# Patient Record
Sex: MALE | Race: WHITE | NOT HISPANIC OR LATINO | Employment: FULL TIME | URBAN - METROPOLITAN AREA
[De-identification: names, ages, dates, MRNs, and addresses within clinical notes are randomized per-mention and may not be internally consistent; named-entity substitution may affect disease eponyms.]

---

## 2017-01-04 ENCOUNTER — TELEPHONE (OUTPATIENT)
Dept: INTERNAL MEDICINE | Facility: CLINIC | Age: 62
End: 2017-01-04

## 2017-01-04 DIAGNOSIS — F90.2 ATTENTION DEFICIT HYPERACTIVITY DISORDER (ADHD), COMBINED TYPE: ICD-10-CM

## 2017-01-04 DIAGNOSIS — R97.20 ELEVATED PSA: ICD-10-CM

## 2017-01-04 DIAGNOSIS — E29.1 HYPOGONADISM MALE: ICD-10-CM

## 2017-01-04 DIAGNOSIS — I10 ESSENTIAL HYPERTENSION: ICD-10-CM

## 2017-01-04 DIAGNOSIS — R73.03 PREDIABETES: ICD-10-CM

## 2017-01-04 DIAGNOSIS — E78.5 HYPERLIPIDEMIA, UNSPECIFIED HYPERLIPIDEMIA TYPE: ICD-10-CM

## 2017-01-04 DIAGNOSIS — Q28.3 CAVERNOUS MALFORMATION: ICD-10-CM

## 2017-01-04 DIAGNOSIS — Z00.00 WELL ADULT EXAM: Primary | ICD-10-CM

## 2017-01-04 DIAGNOSIS — I77.810 ASCENDING AORTA DILATATION: Chronic | ICD-10-CM

## 2017-01-04 DIAGNOSIS — N40.1 BENIGN NON-NODULAR PROSTATIC HYPERPLASIA WITH LOWER URINARY TRACT SYMPTOMS: ICD-10-CM

## 2017-01-04 NOTE — TELEPHONE ENCOUNTER
Please order labs to be scheduled for yearly visit.      Patient has appt. 1/20/2017 with Dr. Ahn.    Will come a week before appt. For labs.

## 2017-01-09 ENCOUNTER — TELEPHONE (OUTPATIENT)
Dept: INTERNAL MEDICINE | Facility: CLINIC | Age: 62
End: 2017-01-09

## 2017-01-10 ENCOUNTER — TELEPHONE (OUTPATIENT)
Dept: INTERNAL MEDICINE | Facility: CLINIC | Age: 62
End: 2017-01-10

## 2017-01-10 ENCOUNTER — LAB VISIT (OUTPATIENT)
Dept: LAB | Facility: HOSPITAL | Age: 62
End: 2017-01-10
Attending: FAMILY MEDICINE
Payer: COMMERCIAL

## 2017-01-10 DIAGNOSIS — E29.1 HYPOGONADISM MALE: ICD-10-CM

## 2017-01-10 DIAGNOSIS — Z11.59 NEED FOR HEPATITIS C SCREENING TEST: ICD-10-CM

## 2017-01-10 DIAGNOSIS — E78.5 HYPERLIPIDEMIA, UNSPECIFIED HYPERLIPIDEMIA TYPE: ICD-10-CM

## 2017-01-10 DIAGNOSIS — R73.03 PREDIABETES: ICD-10-CM

## 2017-01-10 DIAGNOSIS — Z00.00 WELL ADULT EXAM: ICD-10-CM

## 2017-01-10 DIAGNOSIS — E29.1 HYPOGONADISM MALE: Primary | ICD-10-CM

## 2017-01-10 DIAGNOSIS — R97.20 ELEVATED PSA: ICD-10-CM

## 2017-01-10 LAB
25(OH)D3+25(OH)D2 SERPL-MCNC: 35 NG/ML
ALBUMIN SERPL BCP-MCNC: 4.2 G/DL
ALP SERPL-CCNC: 57 U/L
ALT SERPL W/O P-5'-P-CCNC: 24 U/L
ANION GAP SERPL CALC-SCNC: 9 MMOL/L
AST SERPL-CCNC: 28 U/L
BASOPHILS # BLD AUTO: 0.04 K/UL
BASOPHILS NFR BLD: 0.5 %
BILIRUB SERPL-MCNC: 0.7 MG/DL
BUN SERPL-MCNC: 22 MG/DL
CALCIUM SERPL-MCNC: 9.1 MG/DL
CHLORIDE SERPL-SCNC: 105 MMOL/L
CHOLEST/HDLC SERPL: 4.6 {RATIO}
CO2 SERPL-SCNC: 28 MMOL/L
COMPLEXED PSA SERPL-MCNC: 4.8 NG/ML
CREAT SERPL-MCNC: 0.9 MG/DL
DIFFERENTIAL METHOD: ABNORMAL
EOSINOPHIL # BLD AUTO: 0.2 K/UL
EOSINOPHIL NFR BLD: 2.5 %
ERYTHROCYTE [DISTWIDTH] IN BLOOD BY AUTOMATED COUNT: 14.9 %
EST. GFR  (AFRICAN AMERICAN): >60 ML/MIN/1.73 M^2
EST. GFR  (NON AFRICAN AMERICAN): >60 ML/MIN/1.73 M^2
ESTIMATED AVG GLUCOSE: 134 MG/DL
GLUCOSE SERPL-MCNC: 118 MG/DL
HBA1C MFR BLD HPLC: 6.3 %
HCT VFR BLD AUTO: 45.4 %
HCV AB SERPL QL IA: NEGATIVE
HDL/CHOLESTEROL RATIO: 21.7 %
HDLC SERPL-MCNC: 217 MG/DL
HDLC SERPL-MCNC: 47 MG/DL
HGB BLD-MCNC: 15.5 G/DL
LDLC SERPL CALC-MCNC: 145.8 MG/DL
LYMPHOCYTES # BLD AUTO: 2.4 K/UL
LYMPHOCYTES NFR BLD: 30.1 %
MCH RBC QN AUTO: 31.5 PG
MCHC RBC AUTO-ENTMCNC: 34.1 %
MCV RBC AUTO: 92 FL
MONOCYTES # BLD AUTO: 0.7 K/UL
MONOCYTES NFR BLD: 8.2 %
NEUTROPHILS # BLD AUTO: 4.7 K/UL
NEUTROPHILS NFR BLD: 58.4 %
NONHDLC SERPL-MCNC: 170 MG/DL
PLATELET # BLD AUTO: 209 K/UL
PMV BLD AUTO: 11.8 FL
POTASSIUM SERPL-SCNC: 4.2 MMOL/L
PROT SERPL-MCNC: 7.3 G/DL
RBC # BLD AUTO: 4.92 M/UL
SODIUM SERPL-SCNC: 142 MMOL/L
T4 FREE SERPL-MCNC: 0.97 NG/DL
TESTOST SERPL-MCNC: 587 NG/DL
TRIGL SERPL-MCNC: 121 MG/DL
TSH SERPL DL<=0.005 MIU/L-ACNC: 0.9 UIU/ML
WBC # BLD AUTO: 7.97 K/UL

## 2017-01-10 PROCEDURE — 84403 ASSAY OF TOTAL TESTOSTERONE: CPT

## 2017-01-10 PROCEDURE — 83036 HEMOGLOBIN GLYCOSYLATED A1C: CPT

## 2017-01-10 PROCEDURE — 36415 COLL VENOUS BLD VENIPUNCTURE: CPT | Mod: PO

## 2017-01-10 PROCEDURE — 82306 VITAMIN D 25 HYDROXY: CPT

## 2017-01-10 PROCEDURE — 84443 ASSAY THYROID STIM HORMONE: CPT

## 2017-01-10 PROCEDURE — 85025 COMPLETE CBC W/AUTO DIFF WBC: CPT

## 2017-01-10 PROCEDURE — 86803 HEPATITIS C AB TEST: CPT

## 2017-01-10 PROCEDURE — 84153 ASSAY OF PSA TOTAL: CPT

## 2017-01-10 PROCEDURE — 80053 COMPREHEN METABOLIC PANEL: CPT

## 2017-01-10 PROCEDURE — 80061 LIPID PANEL: CPT

## 2017-01-10 PROCEDURE — 84439 ASSAY OF FREE THYROXINE: CPT

## 2017-01-10 NOTE — TELEPHONE ENCOUNTER
Patient want to know if a Testosterone panel can be added to his blood work for his physical exam. Need order in computer if needed.

## 2017-01-20 ENCOUNTER — OFFICE VISIT (OUTPATIENT)
Dept: INTERNAL MEDICINE | Facility: CLINIC | Age: 62
End: 2017-01-20
Payer: COMMERCIAL

## 2017-01-20 VITALS
HEART RATE: 64 BPM | SYSTOLIC BLOOD PRESSURE: 108 MMHG | BODY MASS INDEX: 26.4 KG/M2 | WEIGHT: 205.69 LBS | HEIGHT: 74 IN | DIASTOLIC BLOOD PRESSURE: 68 MMHG | RESPIRATION RATE: 14 BRPM | TEMPERATURE: 98 F

## 2017-01-20 DIAGNOSIS — R73.03 PREDIABETES: ICD-10-CM

## 2017-01-20 DIAGNOSIS — Q28.3 CAVERNOUS MALFORMATION: ICD-10-CM

## 2017-01-20 DIAGNOSIS — E29.1 HYPOGONADISM MALE: ICD-10-CM

## 2017-01-20 DIAGNOSIS — I10 ESSENTIAL HYPERTENSION: ICD-10-CM

## 2017-01-20 DIAGNOSIS — I77.810 ASCENDING AORTA DILATATION: Chronic | ICD-10-CM

## 2017-01-20 DIAGNOSIS — E78.5 HYPERLIPIDEMIA, UNSPECIFIED HYPERLIPIDEMIA TYPE: ICD-10-CM

## 2017-01-20 DIAGNOSIS — F90.2 ATTENTION DEFICIT HYPERACTIVITY DISORDER (ADHD), COMBINED TYPE: ICD-10-CM

## 2017-01-20 DIAGNOSIS — N40.1 BENIGN NON-NODULAR PROSTATIC HYPERPLASIA WITH LOWER URINARY TRACT SYMPTOMS: ICD-10-CM

## 2017-01-20 DIAGNOSIS — Z00.00 WELL ADULT EXAM: Primary | ICD-10-CM

## 2017-01-20 PROCEDURE — 3074F SYST BP LT 130 MM HG: CPT | Mod: S$GLB,,, | Performed by: FAMILY MEDICINE

## 2017-01-20 PROCEDURE — 99396 PREV VISIT EST AGE 40-64: CPT | Mod: S$GLB,,, | Performed by: FAMILY MEDICINE

## 2017-01-20 PROCEDURE — 99999 PR PBB SHADOW E&M-EST. PATIENT-LVL III: CPT | Mod: PBBFAC,,, | Performed by: FAMILY MEDICINE

## 2017-01-20 PROCEDURE — 3078F DIAST BP <80 MM HG: CPT | Mod: S$GLB,,, | Performed by: FAMILY MEDICINE

## 2017-01-20 RX ORDER — LISINOPRIL 40 MG/1
40 TABLET ORAL DAILY
Qty: 30 TABLET | Refills: 11 | Status: SHIPPED | OUTPATIENT
Start: 2017-01-20 | End: 2017-02-07 | Stop reason: SDUPTHER

## 2017-01-20 NOTE — PROGRESS NOTES
Subjective:       Patient ID: Dominguez Zapata is a 61 y.o. male.    Chief Complaint: Annual Exam    HPI 61-year-old white male presents to clinic today for annual physical exam.  He continues to be followed by urology secondary to BPH and hypogonadism which is currently stable on Clomid.  He continues to be followed by neurology secondary to ocular migraines and a cavernous malformation which continues to be stable.  His prediabetes continues to be stable on metformin 500 mg daily.  A1c is 6.3.  Hypertension continues to be well-controlled on lisinopril 20 mg daily.  He continues to be followed by psychiatry for continued treatment of adult ADHD which is stable on Adderall.  He has a past surgical history of prostate biopsy and hernia repair.  He has a family history of his mother passing away from renal failure at the age of 70.  His father passed away from diabetes at the age of 83.  He is up-to-date on colonoscopy which was performed in September 2013 with a recommendation to repeat the colonoscopy in 5 years.  He is up-to-date with all vaccinations.  Review of Systems   Constitutional: Negative for appetite change, chills, fatigue and fever.   HENT: Negative for congestion, ear pain, hearing loss, postnasal drip, rhinorrhea, sinus pressure, sore throat and tinnitus.    Eyes: Negative for redness, itching and visual disturbance.   Respiratory: Negative for cough, chest tightness and shortness of breath.    Cardiovascular: Negative for chest pain and palpitations.   Gastrointestinal: Negative for abdominal pain, constipation, diarrhea, nausea and vomiting.   Genitourinary: Negative for decreased urine volume, difficulty urinating, dysuria, frequency, hematuria and urgency.   Musculoskeletal: Positive for myalgias. Negative for back pain, neck pain and neck stiffness.   Skin: Negative for rash.   Neurological: Negative for dizziness, light-headedness and headaches.   Psychiatric/Behavioral: Negative.         Objective:      Physical Exam   Constitutional: He is oriented to person, place, and time. He appears well-developed and well-nourished. No distress.   HENT:   Head: Normocephalic and atraumatic.   Right Ear: External ear normal.   Left Ear: External ear normal.   Nose: Nose normal.   Mouth/Throat: Oropharynx is clear and moist. No oropharyngeal exudate.   Eyes: Conjunctivae and EOM are normal. Pupils are equal, round, and reactive to light. Right eye exhibits no discharge. Left eye exhibits no discharge. No scleral icterus.   Neck: Normal range of motion. Neck supple. No JVD present. No tracheal deviation present. No thyromegaly present.   Cardiovascular: Normal rate, regular rhythm, normal heart sounds and intact distal pulses.  Exam reveals no gallop and no friction rub.    No murmur heard.  Pulmonary/Chest: Effort normal and breath sounds normal. No stridor. No respiratory distress. He has no wheezes. He has no rales.   Abdominal: Soft. Bowel sounds are normal. He exhibits no distension and no mass. There is no tenderness. There is no rebound and no guarding.   Musculoskeletal: Normal range of motion. He exhibits no edema or tenderness.   Lymphadenopathy:     He has no cervical adenopathy.   Neurological: He is alert and oriented to person, place, and time.   Skin: Skin is warm and dry. No rash noted. He is not diaphoretic. No erythema. No pallor.   Psychiatric: He has a normal mood and affect. His behavior is normal. Judgment and thought content normal.   Nursing note and vitals reviewed.      Assessment:       1. Well adult exam    2. Essential hypertension    3. Hyperlipidemia, unspecified hyperlipidemia type    4. Prediabetes    5. Ascending aorta dilatation    6. Attention deficit hyperactivity disorder (ADHD), combined type    7. Benign non-nodular prostatic hyperplasia with lower urinary tract symptoms    8. Hypogonadism male    9. Cavernous malformation        Plan:       1.  Labs have been reviewed  and are within normal limits.  2.  Continue lisinopril 20 mg daily.  Hypertension is well controlled.  3.  Continue diet and exercise.  4.  Continue metformin 500 mg daily.  Prediabetes is stable with A1c of 6.3.  5.  Aortic dilation is stable.  6.  Continue Adderall and continue follow-up with psychiatry as scheduled.  7.  Continue follow-up with urology as scheduled.  8.  Continue follow-up with neurology as scheduled.  9.  Return to clinic as needed or in 6 months for general exam.

## 2017-01-20 NOTE — MR AVS SNAPSHOT
[facility]   [facility]  [place] [address]  Phone: [phone]  Fax: [phone]                  Dominguez Zapata   2017 1:00 PM   Office Visit    Description:  Male : 1955   Provider:  Dalton Ahn MD   Department:  [place] - Internal Medicine           Reason for Visit     Annual Exam                To Do List           Future Appointments        Provider Department Dept Phone    2/10/2017 1:45 PM Levon Corona MD [facility] [phone]    2017 10:00 AM Meghana Cid MD [facility] [phone]      Goals (5 Years of Data)     None      Follow-Up and Disposition     Return in about 6 months (around 2017), or if symptoms worsen or fail to improve.       These Medications        Disp Refills Start End    lisinopril (PRINIVIL,ZESTRIL) 40 MG tablet 30 tablet 11 2017     Take 1 tablet (40 mg total) by mouth once daily. - Oral    Pharmacy: [facility] [id number] [facility] [employer] [facility] [address] Ph #: [phone]         [facility] On Call     [facility] On Call Nurse Care Line -  Assistance  Registered nurses in the [facility] On Call Center provide clinical advisement, health education, appointment booking, and other advisory services.  Call for this free service at [phone].             Medications           Message regarding Medications     Verify the changes and/or additions to your medication regime listed below are the same as discussed with your clinician today.  If any of these changes or additions are incorrect, please notify your healthcare provider.             Verify that the below list of medications is an accurate representation of the medications you are currently taking.  If none reported, the list may be blank. If incorrect, please contact your healthcare provider. Carry this list with you in case of emergency.           Current Medications     CHOLECALCIFEROL, VITAMIN  "D3, (VITAMIN D3 ORAL) Take 600 mg by mouth once daily.    clomiPHENE (CLOMID) 50 mg tablet Take 0.5 tablets (25 mg total) by mouth once daily.    dextroamphetamine-amphetamine (ADDERALL) 5 mg Tab Take 5 mg by mouth 2 (two) times daily.    DOCOSAHEXANOIC ACID/EPA (FISH OIL ORAL) Take 1 capsule by mouth once daily.    doxycycline (VIBRA-TABS) 100 MG tablet Take 1 tablet (100 mg total) by mouth once daily.    dutasteride (AVODART) 0.5 mg capsule Take 0.5 mg by mouth once daily.    GLUC GONZALEZ/CHONDRO GONZALEZ A/VIT C/MN (GLUCOSAMINE CHONDROITIN MAXSTR ORAL) Take 1 tablet by mouth 2 (two) times daily.    guanfacine 1 mg Tb24 Take 1 tablet by mouth once daily.    lancets (ONE TOUCH DELICA LANCETS) 33 gauge Misc 1 lancet by Misc.(Non-Drug; Combo Route) route Daily.    lisinopril (PRINIVIL,ZESTRIL) 40 MG tablet Take 1 tablet (40 mg total) by mouth once daily.    MAGNESIUM ORAL Take 1 tablet by mouth once daily.    metformin (GLUCOPHAGE) 500 MG tablet Take 1 tablet (500 mg total) by mouth daily with breakfast.    multivitamin capsule Take 1 capsule by mouth once daily.    tadalafil (CIALIS) 5 MG tablet Take 5 mg by mouth once daily.    UBIDECARENONE (COENZYME Q10 ORAL) Take 1 tablet by mouth once daily.    VITAMIN K2 ORAL Take 1 tablet by mouth once daily.           Clinical Reference Information           Vital Signs - Last Recorded  Most recent update: 1/20/2017  1:07 PM by Krupa Shanks    BP Pulse Temp Resp Ht Wt    108/68 (BP Location: Left arm, Patient Position: Sitting, BP Method: Manual) 64 98.3 °F (36.8 °C) (Oral) 14 6' 1.5" (1.867 m) 93.3 kg (205 lb 11 oz)    BMI                26.77 kg/m2          Blood Pressure          Most Recent Value    BP  108/68      Allergies as of 1/20/2017     No Known Allergies      Immunizations Administered on Date of Encounter - 1/20/2017     None      "

## 2017-02-07 RX ORDER — LISINOPRIL 40 MG/1
TABLET ORAL
Qty: 30 TABLET | Refills: 11 | Status: SHIPPED | OUTPATIENT
Start: 2017-02-07 | End: 2018-01-26 | Stop reason: SDUPTHER

## 2017-02-07 RX ORDER — METFORMIN HYDROCHLORIDE 500 MG/1
TABLET ORAL
Qty: 30 TABLET | Refills: 11 | Status: SHIPPED | OUTPATIENT
Start: 2017-02-07 | End: 2017-08-04

## 2017-02-10 ENCOUNTER — OFFICE VISIT (OUTPATIENT)
Dept: UROLOGY | Facility: CLINIC | Age: 62
End: 2017-02-10
Payer: COMMERCIAL

## 2017-02-10 VITALS
HEART RATE: 53 BPM | HEIGHT: 74 IN | DIASTOLIC BLOOD PRESSURE: 82 MMHG | SYSTOLIC BLOOD PRESSURE: 152 MMHG | BODY MASS INDEX: 27.1 KG/M2 | WEIGHT: 211.19 LBS | RESPIRATION RATE: 18 BRPM

## 2017-02-10 DIAGNOSIS — E29.1 HYPOGONADISM MALE: ICD-10-CM

## 2017-02-10 DIAGNOSIS — R35.1 NOCTURIA: Primary | ICD-10-CM

## 2017-02-10 DIAGNOSIS — N52.9 ED (ERECTILE DYSFUNCTION) OF ORGANIC ORIGIN: ICD-10-CM

## 2017-02-10 DIAGNOSIS — N40.1 BENIGN NON-NODULAR PROSTATIC HYPERPLASIA WITH LOWER URINARY TRACT SYMPTOMS: ICD-10-CM

## 2017-02-10 DIAGNOSIS — R97.20 ELEVATED PSA: ICD-10-CM

## 2017-02-10 PROCEDURE — 3077F SYST BP >= 140 MM HG: CPT | Mod: S$GLB,,, | Performed by: UROLOGY

## 2017-02-10 PROCEDURE — 99215 OFFICE O/P EST HI 40 MIN: CPT | Mod: S$GLB,,, | Performed by: UROLOGY

## 2017-02-10 PROCEDURE — 3079F DIAST BP 80-89 MM HG: CPT | Mod: S$GLB,,, | Performed by: UROLOGY

## 2017-02-10 PROCEDURE — 99999 PR PBB SHADOW E&M-EST. PATIENT-LVL IV: CPT | Mod: PBBFAC,,, | Performed by: UROLOGY

## 2017-02-10 RX ORDER — OXYBUTYNIN CHLORIDE 5 MG/1
5 TABLET ORAL NIGHTLY
Qty: 30 TABLET | Refills: 11 | Status: SHIPPED | OUTPATIENT
Start: 2017-02-10 | End: 2017-12-12 | Stop reason: SDUPTHER

## 2017-02-10 NOTE — PROGRESS NOTES
Subjective:      Dominguez Zapata is a 61 y.o. male who was self-referred for evaluation of low T and BPH.      He has been seen by Dr. Mejias in the past but is a new patient to me.    He is s/p Urolift at [facility] in 2014 (was unable to tolerate flomax). Started avodart in July due to recurrent symptoms. Today he states that the only time he has any voiding symptoms is with nocturia 1-2 times per night; with those voids he also has hesitancy and slow stream. These have not improved with Avodart. He also took Cialis daily previously with some relief.     He also has h/o low testosterone treated with Clomid. He was previously to testosterone injections for 20 years. He stopped these in April 2015 and then changed to Clomid. Off treatment, T reached izzy of 77. His current Clomid dose is 50mg daily. He still has low libido, which was better when he was using injections.    He also has h/o elevated PSA. S/p biopsy 9 years ago in [place] (negative). Had MRI prostate in Jan 2015 w/ 82g prostate and low probability for high grade tumor. 4K score in Feb 2015 showed 3% probability of PCa. His highest known PSA is 7.9 in 1/12/2016.     The following portions of the patient's history were reviewed and updated as appropriate: allergies, current medications, past family history, past medical history, past social history, past surgical history and problem list.    Review of Systems  Constitutional: no fever or chills  ENT: no nasal congestion or sore throat  Respiratory: no cough or shortness of breath  Cardiovascular: no chest pain or palpitations  Gastrointestinal: no nausea or vomiting, tolerating diet  Genitourinary: as per HPI  Hematologic/Lymphatic: no easy bruising or lymphadenopathy  Musculoskeletal: no arthralgias or myalgias  Neurological: no seizures or tremors  Behavioral/Psych: no auditory or visual hallucinations     Objective:   Vitals:   Visit Vitals    BP (!) 152/82 (BP Location: Left arm, Patient Position:  "Sitting, BP Method: Automatic)    Pulse (!) 53    Resp 18    Ht 6' 1.5" (1.867 m)    Wt 95.8 kg (211 lb 3.2 oz)    BMI 27.49 kg/m2       Physical Exam   General: alert and oriented, no acute distress  Head: normocephalic, atraumatic  Neck: supple, no lymphadenopathy, normal ROM, no masses  Respiratory: Symmetric expansion, non-labored breathing  Cardiovascular: regular rate and rhythm, nomal pulses, no peripheral edema  Abdomen: soft, non tender, non distended, no palpable masses, no hernias, no hepatomegaly or splenomegaly  Genitourinary:   Penis: normal, no lesions, patent orthotopic meatus, no plaques  Scrotum: no rashes or skin changes;   Testes: descended bilaterally, no masses, nontender, normal epididymides bilaterally, no hydroceles  Prostate: normal for age, normal consistency, non tender, no specific nodules, size: 40 gm; seminal vesicles not palpated  Rectum: normal rectal tone, no rectal mass, normal perineum  Lymphatic: no inguinal nodes  Skin: normal coloration and turgor, no rashes, no suspicious skin lesions noted  Neuro: alert and oriented x3, no gross deficits  Psych: normal judgment and insight, normal mood/affect and non-anxious    Lab Review   Urinalysis demonstrates negative for all components  Lab Results   Component Value Date    WBC 7.97 01/10/2017    HGB 15.5 01/10/2017    HCT 45.4 01/10/2017    MCV 92 01/10/2017     01/10/2017     Lab Results   Component Value Date    CREATININE 0.9 01/10/2017    BUN 22 01/10/2017     Component       PSA, SCREEN PSA DIAGNOSTIC   Latest Ref Rng & Units       0 - 4 ng/ml 0.00 - 4.00 ng/mL   1/10/2017        4.8 (H)   7/6/2016        6.8 (H)   1/12/2016        7.9 (H)   5/20/2015        6.2 (H)   3/18/2015      7:51 AM  7.7 (H)   3/18/2015      7:51 AM  7.7 (H)   2/7/2014        2.7   6/26/2013       3.13    3/25/2013       5.48 (H)    2/22/2013       4.90 (H)        Assessment:     1. Nocturia    2. Benign non-nodular prostatic hyperplasia with " lower urinary tract symptoms    3. Elevated PSA    4. Hypogonadism male    5. ED (erectile dysfunction) of organic origin        Plan:   BPH  Nocturia  -- S/p Urolift in 2014  -- Stop avodart  -- Trial ditropan 5mg qhs    Elevated PSA  -- PSA has been stable w/ good MRI and 4K score as well as prior negative biopsy  -- Discussed importance of aggressive monitoring for prostate cancer while on TRT  -- Explained that if PSA rises above baseline, he will need repeat biopsy  -- Understands that PSA will increase after stopping Avodart but that this does not change PCa risk  -- Will check in 6 months    Hypogonadism  -- Explained that I would not recommend higher dose Clomid   -- Explained best way to increase T at this point it so resume TRT  -- Discussed options for TRT; wishes to trial testopel  -- DC clomid  -- Testopel NA    I spent over 40 minutes with the patient. Over 50% of the visit was spent in counseling and coordination of care.

## 2017-02-14 ENCOUNTER — OFFICE VISIT (OUTPATIENT)
Dept: SLEEP MEDICINE | Facility: CLINIC | Age: 62
End: 2017-02-14
Payer: COMMERCIAL

## 2017-02-14 VITALS
HEART RATE: 63 BPM | WEIGHT: 208.31 LBS | HEIGHT: 74 IN | SYSTOLIC BLOOD PRESSURE: 135 MMHG | BODY MASS INDEX: 26.73 KG/M2 | DIASTOLIC BLOOD PRESSURE: 82 MMHG

## 2017-02-14 DIAGNOSIS — G47.19 EXCESSIVE DAYTIME SLEEPINESS: Primary | ICD-10-CM

## 2017-02-14 PROCEDURE — 99999 PR PBB SHADOW E&M-EST. PATIENT-LVL III: CPT | Mod: PBBFAC,,, | Performed by: PSYCHIATRY & NEUROLOGY

## 2017-02-14 PROCEDURE — 3079F DIAST BP 80-89 MM HG: CPT | Mod: S$GLB,,, | Performed by: PSYCHIATRY & NEUROLOGY

## 2017-02-14 PROCEDURE — 99214 OFFICE O/P EST MOD 30 MIN: CPT | Mod: S$GLB,,, | Performed by: PSYCHIATRY & NEUROLOGY

## 2017-02-14 PROCEDURE — 3075F SYST BP GE 130 - 139MM HG: CPT | Mod: S$GLB,,, | Performed by: PSYCHIATRY & NEUROLOGY

## 2017-02-14 RX ORDER — MODAFINIL 200 MG/1
TABLET ORAL
Qty: 60 TABLET | Refills: 5 | Status: SHIPPED | OUTPATIENT
Start: 2017-02-14 | End: 2017-08-24 | Stop reason: SDUPTHER

## 2017-02-14 NOTE — MR AVS SNAPSHOT
[facility]  [address]  [facility] [address]  Phone: [phone]                  Dominguez Zapata   2017 10:00 AM   Office Visit    Description:  Male : 1955   Provider:  Meghana Cid MD   Department:  [facility]           Reason for Visit     Sleep Apnea           Diagnoses this Visit        Comments    Excessive daytime sleepiness    -  Primary            To Do List           Future Appointments        Provider Department Dept Phone    2/15/2017 2:15 PM Levon Corona MD [facility] [phone]      Goals (5 Years of Data)     None       These Medications        Disp Refills Start End    modafinil (PROVIGIL) 200 MG Tab 60 tablet 5 2017     1 pill PO in AM and the second pill early afternoon PRN sleepiness.    Pharmacy: [place] [id number] [place] [address] #: [phone]         [facility] On Call     [facility] On Call Nurse Care Line -  Assistance  Registered nurses in the [facility] On Call Center provide clinical advisement, health education, appointment booking, and other advisory services.  Call for this free service at [phone].             Medications           Message regarding Medications     Verify the changes and/or additions to your medication regime listed below are the same as discussed with your clinician today.  If any of these changes or additions are incorrect, please notify your healthcare provider.        START taking these NEW medications        Refills    modafinil (PROVIGIL) 200 MG Tab 5    Si pill PO in AM and the second pill early afternoon PRN sleepiness.    Class: Normal           Verify that the below list of medications is an accurate representation of the medications you are currently taking.  If none reported, the list may be blank. If incorrect, please contact your healthcare provider. Carry this list with you in case of emergency.           Current  "Medications     CHOLECALCIFEROL, VITAMIN D3, (VITAMIN D3 ORAL) Take 600 mg by mouth once daily.    dextroamphetamine-amphetamine (ADDERALL) 5 mg Tab Take 5 mg by mouth 2 (two) times daily.    DOCOSAHEXANOIC ACID/EPA (FISH OIL ORAL) Take 1 capsule by mouth once daily.    doxycycline (VIBRA-TABS) 100 MG tablet Take 1 tablet (100 mg total) by mouth once daily.    GLUC ALVAREZ/CHONDRO ALVAREZ A/VIT C/MN (GLUCOSAMINE CHONDROITIN MAXSTR ORAL) Take 1 tablet by mouth 2 (two) times daily.    guanfacine 1 mg Tb24 Take 1 tablet by mouth once daily.    lancets (ONE TOUCH DELICA LANCETS) 33 gauge Misc 1 lancet by Misc.(Non-Drug; Combo Route) route Daily.    lisinopril (PRINIVIL,ZESTRIL) 40 MG tablet TAKE ONE TABLET BY MOUTH ONE TIME DAILY    MAGNESIUM ORAL Take 1 tablet by mouth once daily.    metformin (GLUCOPHAGE) 500 MG tablet TAKE ONE TABLET BY MOUTH DAILY WITH BREAKFAST    multivitamin capsule Take 1 capsule by mouth once daily.    oxybutynin (DITROPAN) 5 MG Tab Take 1 tablet (5 mg total) by mouth every evening.    tadalafil (CIALIS) 5 MG tablet Take 5 mg by mouth once daily.    UBIDECARENONE (COENZYME Q10 ORAL) Take 1 tablet by mouth once daily.    VITAMIN K2 ORAL Take 1 tablet by mouth once daily.    modafinil (PROVIGIL) 200 MG Tab 1 pill PO in AM and the second pill early afternoon PRN sleepiness.           Clinical Reference Information           Your Vitals Were     BP Pulse Height Weight BMI    135/82 (BP Location: Left arm, Patient Position: Sitting, BP Method: Automatic) 63 6' 1.5" (1.867 m) 94.5 kg (208 lb 5.4 oz) 27.11 kg/m2      Blood Pressure          Most Recent Value    BP  135/82      Allergies as of 2/14/2017     No Known Allergies      Immunizations Administered on Date of Encounter - 2/14/2017     None      Language Assistance Services     ATTENTION: Language assistance services are available, free of charge. Please call [phone].      ATENCIÓN: Si habla español, tiene a alvarez disposición servicios gratuitos de " asistencia lingüística. Arti sharma [phone].     DANIELA Ý: N?u b?n nói Ti?ng Vi?t, có các d?ch v? h? tr? ngôn ng? mi?n phí dành cho b?n. G?i s? [phone].         [facility] complies with applicable Federal civil rights laws and does not discriminate on the basis of race, color, national origin, age, disability, or sex.

## 2017-02-14 NOTE — PROGRESS NOTES
Dominguez Zapata  was seen at the request of  Dr. Jensen for sleep evaluation.    07/07/2015: INITIAL HISTORY OF PRESENT ILLNESS:  Dominguez Zapata is a 61 y.o. male is here to be evaluated for a sleep disorder.       CHIEF COMPLAINT:      The patient's complaints include excessive daytime sleepiness, excessive daytime fatigue, snoring,  gasping for air in sleep and interrupted sleep since  Several years back.    Denies  dry mouth and sore throat  Denies nasal congestion   Reports occasional  morning headaches (anytime and had visual migraine aura just twice a week)  Reports  interrupted sleep  Reports occasional frequent leg movements  Denies symptoms concerning for parasomnia    The ESS ([place] Sleepiness Score) taken on initial visit is 11 /24    The patient never had tonsillectomy, adenoidectomy or UPPP     INTERVAL HISTORY:    10/16/2015:   The patient has not presented any new complaints since the previous visit. Comes to discuss PSG.   11/27/2015:    The patient brings CPAP back. Improved sleepiness and sleep continuity. ESS 8/24.    BPAP pressure: 9- EPAP min, 20, IPAP max, PS - 6-8 cm H2O  Mask comfort / fit:  Deamwear    Pressure tolerance: OK   Humidification: OK   CPAP Interrogation:    Ave daily usage:7 hours /7days  Ave daily usage:7 hours /30days  Days >4 hours usage: 7 /7 days  Days >4 hours usage: 30/30 days   Machine condition: good     90-%tile pressure: 18/11 cm H2O  Large leak 0  AHI 12 (was higher with the previous mask)    03/04/2016:    BPAP pressure: 12- EPAP min, 20, IPAP max, PS - 6-8 cm H2O  Mask comfort / fit:  Deamwear    Pressure tolerance: OK   Humidification: Dry  CPAP Interrogation:    Ave daily usage:7 hours /7days  Ave daily usage:7 hours /30days  Days >4 hours usage: 7 /7 days  Days >4 hours usage: 30/30 days   Machine condition: good     90-%tile pressure: EPAP - 13-13  cm H2O  Large leak 0  AHI 7.6-9.2      09/09/2016 :       Event Smmary Date Range:8/10/2016 -  9/8/2016      Compliance Summary Apnea Indices Ventilator Statistics   Days with Device Usage: 28 days Average AHI: 6.2 Average Breath Rate: N/A   Percentage of Days >=4 Hours: 90.0% Average OA Index: 1.2 Average % Patient Triggered Breaths: N/A   Average Usage (Days Used): 6 hrs. 52 mins. 6 secs. Average CA Index: 3.0 Average Tidal Volume: N/A   Average Usage (All Days): 6 hrs. 24 mins. 38 secs.   Average Minute Vent: N/A       Large Leak Periodic Breathing    Average Time in Large Leak: 26 secs. Average % of Night in PB: 0.5%    Average % of Night in Large Leak: 0.1%           He likes his dream wear mask. Tries to keep regular sleep hygiene. Many CA are shown.        The patient has not presented any new complaints since the previous visit.   CA seems to become a problem generating AHI 6 and some sleep interruptions.     Will be camping in .[address] [place] [address] in [place]. \[facility].\"".     Waking up 1-2 times per night.    Not using ramp - reports difficulty falling asleep.   ESS 8/24. Taking Adderall or Nuvigil depending on the day.      02/14/2017: Reports doing OK with current settings. Taking Adderall for ADD and Nuvigil on other days for residual fatigue. ES 12/24. Sleepy since his 40's.  Therapy Event Summary Date Range: 12/16/2016 - 2/13/2017   ThChange  autoBPAP EPAP from min 13 cm, IPAP max 20 cm H2O to EPAP min 13, IPAP max 20, PS min 4 cm H2O and max PS 4 cm H2O.  Smaller PS may help with central apnea.  Will order a sleep study at the end of the month after rechecking [facility] for feedback to see if CA inde.  Continue Nuvigil vs Adderall PRN. He also tried Clonidine at night - helps to improve is ability to focus.  90% pressure - 12-13  AHI 4.5 - 5/hour.    A lot of post arousal centrals.    oxybutynin helped to greatly improve sleep continuity    Hide      Compliance Summary  Apnea Indices  Ventilator Statistics    Days with Device Usage:  57 days  Average AHI:  5.1  Average Breath Rate:  16.5 bpm    Percentage  of Days >=4 Hours:  90.0%  Average OA Index:  2.8  Average % Patient Triggered Breaths:  N/A    Average Usage (Days Used):  8 hrs. 38 mins. 54 secs.  Average CA Index:  1.0  Average Tidal Volume:  201.5 ml    Average Usage (All Days):  8 hrs. 12 mins. 58 secs.    Average Minute Vent:  N/A        Large Leak  Periodic Breathing     Average Time in Large Leak:  29 mins. 44 secs.  Average % of Night in PB:  0.0%     Average % of Night in Large Leak:  5.7%                    SLEEP ROUTINE AND LIFESTYLE 02/14/2017 :    Occupation:[employer]    Bed partner: had ex-wife  Time to bed: 10-11 PM  Sleep onset latency: 30 min  Disruptions or awakenings: 1-2  Time to fall back into sleep: 10 min  Wakeup time: 5 AM   Perceived sleep quality: 3-4  Perceived total sleep time:  7  hours.  Daytime naps: 0  Weekend sleep routine: 6 Am  Exercise routine: yes - run TIW  Caffeine: AM     PREVIOUS SLEEP STUDIES:       SPLIT NIGHT STUDY on 8/18/15: Significant ASPEN (Obstructive Sleep Apnea) with AHI of 96.4 hour and SaO2 izzy of 81% ([id number] lbs). Best control of respiratory events was reached at 15/9 cm H2O CPAP (AHI 9).      DME: ->THS      PAST MEDICAL HISTORY:    Active Ambulatory Problems     Diagnosis Date Noted    Hypertension 01/30/2013    Hyperlipidemia 01/30/2013    Rosacea 01/30/2013    Vertigo 02/08/2013    Overweight(278.02) 02/22/2013    Hypogonadism male 03/25/2013    Elevated PSA 03/25/2013    BPH (benign prostatic hypertrophy) 03/25/2013    Gait abnormality 06/26/2013    External hemorrhoids 07/15/2013    History of colonic polyps 09/06/2013    Cavernous malformation 02/24/2014    Prediabetes 09/18/2014    Abnormality of vitreoretinal interface 03/10/2015    NS (nuclear sclerosis) 03/10/2015    Hearing loss 06/23/2015    Ascending aorta dilatation 07/28/2015    Attention deficit hyperactivity disorder (ADHD), combined type 06/17/2016     Resolved Ambulatory Problems     Diagnosis Date Noted     Unspecified essential hypertension      Past Medical History   Diagnosis Date    Colon polyps     Hearing loss in right ear     Low serum testosterone level     Migraine     Mixed hyperlipidemia     Stroke                 PAST SURGICAL HISTORY:    Past Surgical History   Procedure Laterality Date    Hernia repair      Prostate biopsy      Prostate biopsy  1/22/10     PSA 4.12, negative for malignancy, some chronic inflammation noted    Prostate surgery       Urolift    Bone anchored hearing aid Right 06/23/2015         FAMILY HISTORY:                Family History   Problem Relation Age of Onset    Heart disease Mother     Diabetes Mother     Kidney disease Mother      Dialysis    Hypertension Mother     Diabetes Father     Heart disease Father      Valvular disease    Hypertension Father     Amblyopia Neg Hx     Blindness Neg Hx     Cataracts Neg Hx     Glaucoma Neg Hx     Macular degeneration Neg Hx     Retinal detachment Neg Hx     Strabismus Neg Hx        SOCIAL HISTORY:          Tobacco:   History   Smoking Status    Never Smoker   Smokeless Tobacco    Never Used       alcohol use:    History   Alcohol Use    Yes     Comment: social                   ALLERGIES:  Review of patient's allergies indicates:  No Known Allergies    CURRENT MEDICATIONS:    Current Outpatient Prescriptions   Medication Sig Dispense Refill    CHOLECALCIFEROL, VITAMIN D3, (VITAMIN D3 ORAL) Take 600 mg by mouth once daily.      dextroamphetamine-amphetamine (ADDERALL) 5 mg Tab Take 5 mg by mouth 2 (two) times daily. 60 tablet 0    DOCOSAHEXANOIC ACID/EPA (FISH OIL ORAL) Take 1 capsule by mouth once daily.      doxycycline (VIBRA-TABS) 100 MG tablet Take 1 tablet (100 mg total) by mouth once daily. 30 tablet 11    GLUC GONZALEZ/CHONDRO GONZALEZ A/VIT C/MN (GLUCOSAMINE CHONDROITIN MAXSTR ORAL) Take 1 tablet by mouth 2 (two) times daily.      guanfacine 1 mg Tb24 Take 1 tablet by mouth once daily. 30 tablet 5    lancets  "(ONE TOUCH DELICA LANCETS) 33 gauge Misc 1 lancet by Misc.(Non-Drug; Combo Route) route Daily. 50 each 11    lisinopril (PRINIVIL,ZESTRIL) 40 MG tablet TAKE ONE TABLET BY MOUTH ONE TIME DAILY 30 tablet 11    MAGNESIUM ORAL Take 1 tablet by mouth once daily.      metformin (GLUCOPHAGE) 500 MG tablet TAKE ONE TABLET BY MOUTH DAILY WITH BREAKFAST 30 tablet 11    multivitamin capsule Take 1 capsule by mouth once daily.      oxybutynin (DITROPAN) 5 MG Tab Take 1 tablet (5 mg total) by mouth every evening. 30 tablet 11    tadalafil (CIALIS) 5 MG tablet Take 5 mg by mouth once daily.      UBIDECARENONE (COENZYME Q10 ORAL) Take 1 tablet by mouth once daily.      VITAMIN K2 ORAL Take 1 tablet by mouth once daily.       Current Facility-Administered Medications   Medication Dose Route Frequency Provider Last Rate Last Dose    testosterone Pllt 10 Pellet  10 Pellet Implant 1 time in Clinic/HOD Levon Corona MD                          REVIEW OF SYSTEMS:   Sleep related symptoms as per HPI    denies weight gain  Denies dyspnea  Denies palpitations  Denies acid reflux   Reports occasional polyuria  Reports occasional  mood diturbance  Denies  anemia  Denies  muscle pain  Denies  Gait imbalance    Otherwise, a balance of 10 systems reviewed is negative.    PHYSICAL EXAM:  Visit Vitals    /82 (BP Location: Left arm, Patient Position: Sitting, BP Method: Automatic)    Pulse 63    Ht 6' 1.5" (1.867 m)    Wt 94.5 kg (208 lb 5.4 oz)    BMI 27.11 kg/m2     GENERAL: Normal development, well groomed.  HEENT:   HEENT:  Conjunctivae are non-erythematous; Pupils equal, round, and reactive to light; Nose is symmetrical; Nasal mucosa is pink and moist; Septum is midline; Inferior turbinates are normal; Nasal airflow is normal; Posterior pharynx is pink; Modified Mallampati: II; Posterior palate is arched; Tonsils not visualized; Uvula is wide and elongated;Tongue is enlarged; Dentition is fair; No TMJ tenderness; Jaw " "opening and protrusion without click and without discomfort.  NECK: Supple. Neck circumference is 16 inches. No thyromegaly. No palpable nodes.     SKIN: On face and neck: No abrasions, no rashes, no lesions.  No subcutaneous nodules are palpable.  RESPIRATORY: Chest is clear to auscultation.  Normal chest expansion and non-labored breathing at rest.  CARDIOVASCULAR: Normal S1, S2.  No murmurs, gallops or rubs. No carotid bruits bilaterally.  No edema. No clubbing. No cyanosis.    NEURO: Oriented to time, place and person. Normal attention span and concentration. Gait normal.    PSYCH: Affect is full. Mood is normal  MUSCULOSKELETAL: Moves 4 extremities. Gait normal.         Using [facility]: Yes      ASSESSMENT:    1. Severe ASPEN The patient symptomatically has  excessive daytime sleepiness, snoring, gasping for air in sleep and interrupted sleep  with exam findings of "a crowded oral airway and elevated body mass index. The patient has medical co-morbidities of diabetes, hyperlipidemia and hypertension,  which can be worsened by ASPEN. This warrants treatment. Clinical improvement on further EPAP increase, however AHI remains elevated due to CA. This may be related by too high pressure support (6cm H2O). Pattern typical for CA ws noted on Dream station waveform page. The patient still reports subopmtimal sleep quality - waking up 2 times per night. Concerned with insufficient deep sleep on [employer] reading.         PLAN:      Continue  autoBPAP  to EPAP min 13, IPAP max 20, PS min 4 cm H2O and max PS 4 cm H2O.  Smaller PS may help with central apnea.  .  Continue Nuvigil vs Adderall PRN for ADD.     Sleep hygiene brochure.    Education: During our discussion today, we talked about the etiology of obstructive sleep apnea as well as the potential ramifications of untreated sleep apnea, which could include daytime sleepiness, hypertension, heart disease and/or stroke.  We discussed potential treatment options, which " could include weight loss, body positioning, continuous positive airway pressure (CPAP), or referral for surgical consideration. The patient preferred CPAP option.    She should avoid ETOH and sedatives at night, as it tends to aggravate ASPEN. Regular replacement of CPAP mask, tubing and filter was recommended.    Precautions: The patient was advised to abstain from driving should he feel sleepy or drowsy.    Follow up: MD/NP after the sleep study.     Thank you for allowing me the opportunity to participate in the care of your patient.

## 2017-02-15 ENCOUNTER — PROCEDURE VISIT (OUTPATIENT)
Dept: UROLOGY | Facility: CLINIC | Age: 62
End: 2017-02-15
Attending: UROLOGY
Payer: COMMERCIAL

## 2017-02-15 VITALS
BODY MASS INDEX: 26.69 KG/M2 | SYSTOLIC BLOOD PRESSURE: 148 MMHG | HEART RATE: 56 BPM | DIASTOLIC BLOOD PRESSURE: 92 MMHG | WEIGHT: 208 LBS | HEIGHT: 74 IN

## 2017-02-15 DIAGNOSIS — E29.1 HYPOGONADISM MALE: Primary | ICD-10-CM

## 2017-02-15 PROCEDURE — 11980 IMPLANT HORMONE PELLET(S): CPT | Mod: S$GLB,,, | Performed by: UROLOGY

## 2017-02-15 PROCEDURE — S0189 TESTOSTERONE PELLET 75 MG: HCPCS | Mod: S$GLB,,, | Performed by: UROLOGY

## 2017-02-15 RX ORDER — LIDOCAINE HYDROCHLORIDE AND EPINEPHRINE 20; 10 MG/ML; UG/ML
5 INJECTION, SOLUTION INFILTRATION; PERINEURAL
Status: COMPLETED | OUTPATIENT
Start: 2017-02-15 | End: 2017-02-15

## 2017-02-15 RX ADMIN — LIDOCAINE HYDROCHLORIDE AND EPINEPHRINE 5 ML: 20; 10 INJECTION, SOLUTION INFILTRATION; PERINEURAL at 03:02

## 2017-02-15 NOTE — PROCEDURES
Procedures   02/15/2017    Testopel Insertion    Surgeon: Fredi Corona MD  Consent: Written  Anesthesia: Local - 5cc 2% lidocaine w/ epi  Location: Right gluteal  Prep: Betadine  # of pellets: 10  # of tracts: 2  Closure: 4-0 Monocryl    Procedure:  After injecting lidocaine, a stab incision was made in the above location.  The insertion trocar was advanced into subcutaneous fat.  The pellets were placed into the trocar and advanced with plunger.  The trocar was withdrawn and re-advanced into a second tract if needed.  The trocar was removed and incision closed with suture.  Sterile dressing was applied.  The patient tolerated the procedure well.    Plan:  -- Provided with post-procedure instructions  -- Repeat testosterone monthly to monitor for correct dosing  -- RTC in 3 months to review

## 2017-02-15 NOTE — MR AVS SNAPSHOT
[facility]  [address], [address]  [facility] [address]  Phone: [phone]                  Dominguez Zapata   2/15/2017 2:15 PM   Procedure visit    Description:  Male : 1955   Provider:  Levon Corona MD   Department:  [facility]           Reason for Visit     Follow-up           Diagnoses this Visit        Comments    Hypogonadism male    -  Primary            To Do List           Future Appointments        Provider Department Dept Phone    2017 8:15 AM Levon Corona MD [facility] [phone]      Goals (5 Years of Data)     None      [facility]     [facility] Care [facility] -  Assistance  Registered nurses in the [facility] provide clinical advisement, health education, appointment booking, and other advisory services.  Call for this free service at [phone].             Medications           Message regarding Medications     Verify the changes and/or additions to your medication regime listed below are the same as discussed with your clinician today.  If any of these changes or additions are incorrect, please notify your healthcare provider.             Verify that the below list of medications is an accurate representation of the medications you are currently taking.  If none reported, the list may be blank. If incorrect, please contact your healthcare provider. Carry this list with you in case of emergency.           Current Medications     CHOLECALCIFEROL, VITAMIN D3, (VITAMIN D3 ORAL) Take 600 mg by mouth once daily.    DOCOSAHEXANOIC ACID/EPA (FISH OIL ORAL) Take 1 capsule by mouth once daily.    doxycycline (VIBRA-TABS) 100 MG tablet Take 1 tablet (100 mg total) by mouth once daily.    GLUC GONZALEZ/CHONDRO GONZALEZ A/VIT C/MN (GLUCOSAMINE CHONDROITIN MAXSTR ORAL) Take 1 tablet by mouth 2 (two) times daily.    guanfacine 1 mg Tb24 Take 1 tablet by mouth once daily.    lancets (ONE TOUCH DELICA LANCETS) 33 gauge Misc  "1 lancet by Misc.(Non-Drug; Combo Route) route Daily.    lisinopril (PRINIVIL,ZESTRIL) 40 MG tablet TAKE ONE TABLET BY MOUTH ONE TIME DAILY    MAGNESIUM ORAL Take 1 tablet by mouth once daily.    metformin (GLUCOPHAGE) 500 MG tablet TAKE ONE TABLET BY MOUTH DAILY WITH BREAKFAST    modafinil (PROVIGIL) 200 MG Tab 1 pill PO in AM and the second pill early afternoon PRN sleepiness.    multivitamin capsule Take 1 capsule by mouth once daily.    oxybutynin (DITROPAN) 5 MG Tab Take 1 tablet (5 mg total) by mouth every evening.    tadalafil (CIALIS) 5 MG tablet Take 5 mg by mouth once daily.    UBIDECARENONE (COENZYME Q10 ORAL) Take 1 tablet by mouth once daily.    VITAMIN K2 ORAL Take 1 tablet by mouth once daily.    dextroamphetamine-amphetamine (ADDERALL) 5 mg Tab Take 5 mg by mouth 2 (two) times daily.           Clinical Reference Information           Your Vitals Were     BP Pulse Height Weight BMI    148/92 (BP Location: Left arm, Patient Position: Sitting, BP Method: Automatic) 56 6' 1.5" (1.867 m) 94.3 kg (208 lb) 27.07 kg/m2      Blood Pressure          Most Recent Value    BP  (!)  148/92      Allergies as of 2/15/2017     No Known Allergies      Immunizations Administered on Date of Encounter - 2/15/2017     None      Orders Placed During Today's Visit     Future Labs/Procedures Expected by Expires    CBC auto differential  2/15/2017 4/16/2018    Recurring Lab Work Interval Expires    Testosterone  Every 4 Weeks until 4/16/2018 4/16/2018      Language Assistance Services     ATTENTION: Language assistance services are available, free of charge. Please call [phone].      ATENCIÓN: Si habla bk, tiene a alvarez disposición servicios gratuitos de asistencia lingüística. Llame al [phone].     [facility] Ý: N?u b?n nói Ti?ng Vi?t, có các d?ch v? h? tr? ngôn ng? mi?n phí dành cho b?n. G?i s? [phone].         [facility] complies with applicable Federal civil rights laws and does not discriminate on " the basis of race, color, national origin, age, disability, or sex.

## 2017-02-20 ENCOUNTER — TELEPHONE (OUTPATIENT)
Dept: SLEEP MEDICINE | Facility: CLINIC | Age: 62
End: 2017-02-20

## 2017-02-23 ENCOUNTER — PATIENT MESSAGE (OUTPATIENT)
Dept: SLEEP MEDICINE | Facility: CLINIC | Age: 62
End: 2017-02-23

## 2017-03-07 RX ORDER — DOXYCYCLINE HYCLATE 100 MG
TABLET ORAL
Qty: 30 TABLET | Refills: 11 | Status: SHIPPED | OUTPATIENT
Start: 2017-03-07 | End: 2017-12-11 | Stop reason: SDUPTHER

## 2017-03-10 ENCOUNTER — OFFICE VISIT (OUTPATIENT)
Dept: PSYCHIATRY | Facility: CLINIC | Age: 62
End: 2017-03-10
Payer: COMMERCIAL

## 2017-03-10 VITALS
HEIGHT: 73 IN | DIASTOLIC BLOOD PRESSURE: 77 MMHG | BODY MASS INDEX: 28.44 KG/M2 | HEART RATE: 56 BPM | WEIGHT: 214.63 LBS | SYSTOLIC BLOOD PRESSURE: 126 MMHG

## 2017-03-10 DIAGNOSIS — F90.2 ATTENTION DEFICIT HYPERACTIVITY DISORDER (ADHD), COMBINED TYPE: ICD-10-CM

## 2017-03-10 PROCEDURE — 99999 PR PBB SHADOW E&M-EST. PATIENT-LVL II: CPT | Mod: PBBFAC,,, | Performed by: PSYCHIATRY & NEUROLOGY

## 2017-03-10 PROCEDURE — 3074F SYST BP LT 130 MM HG: CPT | Mod: S$GLB,,, | Performed by: PSYCHIATRY & NEUROLOGY

## 2017-03-10 PROCEDURE — 1160F RVW MEDS BY RX/DR IN RCRD: CPT | Mod: S$GLB,,, | Performed by: PSYCHIATRY & NEUROLOGY

## 2017-03-10 PROCEDURE — 3078F DIAST BP <80 MM HG: CPT | Mod: S$GLB,,, | Performed by: PSYCHIATRY & NEUROLOGY

## 2017-03-10 PROCEDURE — 99214 OFFICE O/P EST MOD 30 MIN: CPT | Mod: S$GLB,,, | Performed by: PSYCHIATRY & NEUROLOGY

## 2017-03-10 RX ORDER — DEXTROAMPHETAMINE SACCHARATE, AMPHETAMINE ASPARTATE, DEXTROAMPHETAMINE SULFATE AND AMPHETAMINE SULFATE 1.25; 1.25; 1.25; 1.25 MG/1; MG/1; MG/1; MG/1
1 TABLET ORAL 2 TIMES DAILY
Qty: 60 TABLET | Refills: 0 | Status: SHIPPED | OUTPATIENT
Start: 2017-03-10 | End: 2017-04-09

## 2017-03-10 RX ORDER — DEXTROAMPHETAMINE SACCHARATE, AMPHETAMINE ASPARTATE, DEXTROAMPHETAMINE SULFATE AND AMPHETAMINE SULFATE 1.25; 1.25; 1.25; 1.25 MG/1; MG/1; MG/1; MG/1
1 TABLET ORAL 2 TIMES DAILY
Qty: 60 TABLET | Refills: 0 | Status: SHIPPED | OUTPATIENT
Start: 2017-05-09 | End: 2017-05-05 | Stop reason: SDUPTHER

## 2017-03-10 RX ORDER — GUANFACINE 2 MG/1
2 TABLET, EXTENDED RELEASE ORAL DAILY
Qty: 30 TABLET | Refills: 5 | Status: SHIPPED | OUTPATIENT
Start: 2017-03-10 | End: 2017-04-09

## 2017-03-10 RX ORDER — DEXTROAMPHETAMINE SACCHARATE, AMPHETAMINE ASPARTATE, DEXTROAMPHETAMINE SULFATE AND AMPHETAMINE SULFATE 1.25; 1.25; 1.25; 1.25 MG/1; MG/1; MG/1; MG/1
1 TABLET ORAL 2 TIMES DAILY
Qty: 60 TABLET | Refills: 0 | Status: SHIPPED | OUTPATIENT
Start: 2017-04-09 | End: 2017-06-30 | Stop reason: SDUPTHER

## 2017-03-10 NOTE — PROGRESS NOTES
Ambulatory Psychiatry Established Patient Follow-up Note      Chief Complaint  presents for followup of focus problems    Time Spent  20 minutes    HISTORY  Interval History  Doing well. Adderall still helps with focus and sustained concentration. Still feels a little hyper and disorganized at time and would like to try increased guanfacine to help with it. Denies side effects. Running long [employer] races, including 50 K this weekend.    ROS   Complete review of systems performed covering Constitutional, Eyes, ENT/Mouth, Cardiovascular, Respiratory, Gastrointestinal, Genitourinary, Musculoskeletal, Skin, Neurologic, Endocrine, and Allergy/Immune. All systems were negative.    Psych ROS covered elsewhere in note (HPI)    PFSH  Past Medical History reviewed: Yes  Family History reviewed: No  Social History reviewed: Yes  Medications/problem list/allergies reviewed: Yes    Medications    Scheduled and PRN Medications     Current Outpatient Prescriptions:     CHOLECALCIFEROL, VITAMIN D3, (VITAMIN D3 ORAL), Take 600 mg by mouth once daily., Disp: , Rfl:     dextroamphetamine-amphetamine (ADDERALL) 5 mg Tab, Take 5 mg by mouth 2 (two) times daily., Disp: 60 tablet, Rfl: 0    DOCOSAHEXANOIC ACID/EPA (FISH OIL ORAL), Take 1 capsule by mouth once daily., Disp: , Rfl:     doxycycline (VIBRA-TABS) 100 MG tablet, TAKE ONE TABLET BY MOUTH ONE TIME DAILY, Disp: 30 tablet, Rfl: 11    GLUC GONZALEZ/CHONDRO GONZALEZ A/VIT C/MN (GLUCOSAMINE CHONDROITIN MAXSTR ORAL), Take 1 tablet by mouth 2 (two) times daily., Disp: , Rfl:     guanfacine 1 mg Tb24, Take 1 tablet by mouth once daily., Disp: 30 tablet, Rfl: 5    lancets (ONE TOUCH DELICA LANCETS) 33 gauge Misc, 1 lancet by Misc.(Non-Drug; Combo Route) route Daily., Disp: 50 each, Rfl: 11    lisinopril (PRINIVIL,ZESTRIL) 40 MG tablet, TAKE ONE TABLET BY MOUTH ONE TIME DAILY, Disp: 30 tablet, Rfl: 11    MAGNESIUM ORAL, Take 1 tablet by mouth once daily., Disp: , Rfl:     metformin  "(GLUCOPHAGE) 500 MG tablet, TAKE ONE TABLET BY MOUTH DAILY WITH BREAKFAST, Disp: 30 tablet, Rfl: 11    modafinil (PROVIGIL) 200 MG Tab, 1 pill PO in AM and the second pill early afternoon PRN sleepiness., Disp: 60 tablet, Rfl: 5    multivitamin capsule, Take 1 capsule by mouth once daily., Disp: , Rfl:     oxybutynin (DITROPAN) 5 MG Tab, Take 1 tablet (5 mg total) by mouth every evening., Disp: 30 tablet, Rfl: 11    tadalafil (CIALIS) 5 MG tablet, Take 5 mg by mouth once daily., Disp: , Rfl:     UBIDECARENONE (COENZYME Q10 ORAL), Take 1 tablet by mouth once daily., Disp: , Rfl:     VITAMIN K2 ORAL, Take 1 tablet by mouth once daily., Disp: , Rfl:     Allergies  Review of patient's allergies indicates:  No Known Allergies    EXAM  VITALS   Vitals:    03/10/17 1519   BP: 126/77   Pulse: (!) 56   Weight: 97.3 kg (214 lb 9.6 oz)   Height: 6' 1" (1.854 m)       RELEVANT LABS/STUDIES:    PSYCHIATRIC EXAMINATION  Appearance: well groomed, appearing healthy and of stated age    Behavior: cooperative, pleasant, no psychomotor agitation or retardation.   Speech: normal rate, rhythm, prosody, volume and amount  Mood: good  Affect: normal range  Thought Process: linear, logical, goal directed, somewhat obsessive  Thought Content: negative for suicidal ideation, homicidal ideation, delusions or hallucinations.  Associations: intact  Memory: grossly intact  Level of Consciousness/Orientation: grossly intact  Fund of Knowledge: good  Attention: good.  Language: fluent, able to name abstract and concrete objects.  Insight: fair  Judgment: fair    Psychomotor signs: no involuntary movements or tremor  Gait: normal    Medical Decision Making    IMPRESSION   61 yo  male  reporting lifelong struggles with attention, now presenting with worsening attention over past several years. Pt reports hx of possible small stroke several years ago which may have led to worsening sx; however review of patient's chart show no " clear evidence for significant cardivascular or neurological disease apart from mild hypertension and a ? Small cerebral cavernous malformation. Patient does meet critieria for ADHD with numerous sx of inattention, forgetfulness, distractibility and restlessness, which have been present since childhood. Pt does display significant anxiety about his health and obsessive compulsive personality traits, with extensive research and obsessive behavior towards health monitoring and treatment. However patient is not significantly anxious in affect and denies anxiety apart form health concerns. Therefore Generalized Anxiety Disorder not clearly playing a role. No clear cognitive impairment, MOCA 28/30 on intake, with loss of one point on delayed recall and one point on attention for letter list response. Started guanfacine with mild benefit to hyperactivity, but focus problems unchanged. Had reevaluation by cardiology and PCP, including echo, no concern for bp or other cardiovascular issues that would contraindicate stimulant trial.  Had no response to low dose ritalin, returned after trial of adderall, reporting good benefit with no side effects. Pt returned for follow up recently reporting good control of ADHD sx on combination of guanfacine and low dose adderall, denies side effects. Today returns reporting continued good response notes still mild restlessness, for which he would like to try higher dose of guanfacine.      DIAGNOSES  Attention Deficit Disorder, combined type        PLAN  -continue adderall 5 mg bid, discussed risks and benefits, patient consents for treatment. Reviewed [place]  no evidence of misuse. Pt also has prescription for modafinil to address sleep apnea related daytime sedation but only uses on weekends when he does not take stimulant.   -increase guanfacine XR to 2 mg daily.   -BP well controlled, mild aortic dilatation stable, no contraindication to stimulant treatment.  -set up appointment with  psychologist Khurram Bush to review hx and discuss behavioral treatment of ADD.  - Monitor blood pressure and heart rate. Encouraged pt to follow up with cardiology.   -return for follow up in three months    More than 50% of the time was spent on counseling and coordination of care.  Psychoeducation, behavioral counseling on ADHD sx and sleep hygiene

## 2017-03-15 ENCOUNTER — PATIENT MESSAGE (OUTPATIENT)
Dept: UROLOGY | Facility: CLINIC | Age: 62
End: 2017-03-15

## 2017-03-16 ENCOUNTER — LAB VISIT (OUTPATIENT)
Dept: LAB | Facility: HOSPITAL | Age: 62
End: 2017-03-16
Attending: UROLOGY
Payer: COMMERCIAL

## 2017-03-16 DIAGNOSIS — E29.1 HYPOGONADISM MALE: ICD-10-CM

## 2017-03-16 LAB
BASOPHILS # BLD AUTO: 0.05 K/UL
BASOPHILS NFR BLD: 0.8 %
DIFFERENTIAL METHOD: ABNORMAL
EOSINOPHIL # BLD AUTO: 0.2 K/UL
EOSINOPHIL NFR BLD: 2.3 %
ERYTHROCYTE [DISTWIDTH] IN BLOOD BY AUTOMATED COUNT: 13.5 %
HCT VFR BLD AUTO: 48.9 %
HGB BLD-MCNC: 16.3 G/DL
LYMPHOCYTES # BLD AUTO: 2.1 K/UL
LYMPHOCYTES NFR BLD: 32.2 %
MCH RBC QN AUTO: 31.7 PG
MCHC RBC AUTO-ENTMCNC: 33.3 %
MCV RBC AUTO: 95 FL
MONOCYTES # BLD AUTO: 0.6 K/UL
MONOCYTES NFR BLD: 10 %
NEUTROPHILS # BLD AUTO: 3.5 K/UL
NEUTROPHILS NFR BLD: 54.4 %
PLATELET # BLD AUTO: 234 K/UL
PMV BLD AUTO: 11.8 FL
RBC # BLD AUTO: 5.15 M/UL
TESTOST SERPL-MCNC: 1078 NG/DL
WBC # BLD AUTO: 6.42 K/UL

## 2017-03-16 PROCEDURE — 84403 ASSAY OF TOTAL TESTOSTERONE: CPT

## 2017-03-16 PROCEDURE — 36415 COLL VENOUS BLD VENIPUNCTURE: CPT | Mod: PO

## 2017-03-16 PROCEDURE — 85025 COMPLETE CBC W/AUTO DIFF WBC: CPT

## 2017-04-13 ENCOUNTER — LAB VISIT (OUTPATIENT)
Dept: LAB | Facility: HOSPITAL | Age: 62
End: 2017-04-13
Attending: UROLOGY
Payer: COMMERCIAL

## 2017-04-13 DIAGNOSIS — E29.1 HYPOGONADISM MALE: ICD-10-CM

## 2017-04-13 LAB — TESTOST SERPL-MCNC: 657 NG/DL

## 2017-04-13 PROCEDURE — 36415 COLL VENOUS BLD VENIPUNCTURE: CPT | Mod: PO

## 2017-04-13 PROCEDURE — 84403 ASSAY OF TOTAL TESTOSTERONE: CPT

## 2017-05-02 ENCOUNTER — LAB VISIT (OUTPATIENT)
Dept: LAB | Facility: HOSPITAL | Age: 62
End: 2017-05-02
Attending: UROLOGY
Payer: COMMERCIAL

## 2017-05-02 DIAGNOSIS — E29.1 HYPOGONADISM MALE: ICD-10-CM

## 2017-05-02 PROCEDURE — 84403 ASSAY OF TOTAL TESTOSTERONE: CPT

## 2017-05-02 PROCEDURE — 36415 COLL VENOUS BLD VENIPUNCTURE: CPT | Mod: PO

## 2017-05-03 LAB — TESTOST SERPL-MCNC: 446 NG/DL

## 2017-05-05 ENCOUNTER — OFFICE VISIT (OUTPATIENT)
Dept: UROLOGY | Facility: CLINIC | Age: 62
End: 2017-05-05
Payer: COMMERCIAL

## 2017-05-05 VITALS
DIASTOLIC BLOOD PRESSURE: 90 MMHG | WEIGHT: 213.38 LBS | HEIGHT: 73 IN | HEART RATE: 58 BPM | BODY MASS INDEX: 28.28 KG/M2 | SYSTOLIC BLOOD PRESSURE: 155 MMHG

## 2017-05-05 DIAGNOSIS — E29.1 HYPOGONADISM MALE: Primary | ICD-10-CM

## 2017-05-05 DIAGNOSIS — R35.1 NOCTURIA: ICD-10-CM

## 2017-05-05 DIAGNOSIS — R97.20 ELEVATED PSA: ICD-10-CM

## 2017-05-05 PROCEDURE — 3080F DIAST BP >= 90 MM HG: CPT | Mod: S$GLB,,, | Performed by: UROLOGY

## 2017-05-05 PROCEDURE — 1160F RVW MEDS BY RX/DR IN RCRD: CPT | Mod: S$GLB,,, | Performed by: UROLOGY

## 2017-05-05 PROCEDURE — 99999 PR PBB SHADOW E&M-EST. PATIENT-LVL IV: CPT | Mod: PBBFAC,,, | Performed by: UROLOGY

## 2017-05-05 PROCEDURE — 3077F SYST BP >= 140 MM HG: CPT | Mod: S$GLB,,, | Performed by: UROLOGY

## 2017-05-05 PROCEDURE — 99214 OFFICE O/P EST MOD 30 MIN: CPT | Mod: S$GLB,,, | Performed by: UROLOGY

## 2017-05-05 RX ORDER — TESTOSTERONE CYPIONATE 200 MG/ML
300 INJECTION, SOLUTION INTRAMUSCULAR
Qty: 10 ML | Refills: 1 | Status: SHIPPED | OUTPATIENT
Start: 2017-05-05 | End: 2017-11-13 | Stop reason: SDUPTHER

## 2017-05-05 NOTE — PROGRESS NOTES
"Subjective:      Dominguez Zapata is a 61 y.o. male who returns today regarding his hypogonadism.    He is s/p first testopel on 2/15/17. Pre-treatment symptoms included decreased libido. He had previously been treated with injections and more recently Clomid. He feels that testopel was effective in treating symptoms however he does not wish to continue due to cost and pain from procedure.    He also has h/o BPH treated w/ Urolift at [facility] in 2014. More recently he has been bothered by nocturia and started nightly ditropan in February. He states this has been effective at reducing nocturia.    He also has h/o elevated PSA, s/p biopsy in [place] in 2009 (negative) and MRI in 2015 (low probability of tumor).  His highest known PSA is 7.9 in Jan 2016. Last PSA was 4.8 in Jan 2017.    The following portions of the patient's history were reviewed and updated as appropriate: allergies, current medications, past family history, past medical history, past social history, past surgical history and problem list.    Review of Systems  A comprehensive multipoint review of systems was negative except as otherwise stated in the HPI.     Objective:   Vitals: BP (!) 155/90  Pulse (!) 58  Ht 6' 1" (1.854 m)  Wt 96.8 kg (213 lb 6.5 oz)  BMI 28.16 kg/m2    Physical Exam   General: alert and oriented, no acute distress  Respiratory: Symmetric expansion, non-labored breathing  Neuro: no gross deficits  Psych: normal judgment and insight, normal mood/affect and non-anxious    Lab Review   Urinalysis demonstrates negative for all components  Lab Results   Component Value Date    WBC 6.42 03/16/2017    HGB 16.3 03/16/2017    HCT 48.9 03/16/2017    MCV 95 03/16/2017     03/16/2017     Lab Results   Component Value Date    CREATININE 0.9 01/10/2017    BUN 22 01/10/2017     Lab Results   Component Value Date    PSA 3.13 06/26/2013    PSADIAG 4.8 (H) 01/10/2017     Component Testosterone, Total   Latest Ref Rng & Units 195.0 - " 1138.0 ng/dL   5/2/2017 446   4/13/2017 657   3/16/2017 1078       Assessment and Plan:   1. Hypogonadism male  -- Good response to testopel but wishes to change back to injections  -- Will start 300mg IM every 2 weeks w/ labs and FU after 3rd injections    2. Nocturia  -- Continue ditropan qhs    3. Elevated PSA  -- Repeat in 6 mos

## 2017-05-05 NOTE — MR AVS SNAPSHOT
[facility]   [facility] [place] [address]  Phone: [phone]                  Dominguez Zapata   2017 1:00 PM   Office Visit    Description:  Male : 1955   Provider:  Levon Corona MD   Department:  [facility]           Reason for Visit     Follow-up           Diagnoses this Visit        Comments    Hypogonadism male    -  Primary            To Do List           Future Appointments        Provider Department Dept Phone    2017 1:00 PM [facility], SPECIMEN DROP-OFF [facility] [phone]    2017 2:15 PM Levon Corona MD [facility] [phone]      Goals (5 Years of Data)     None       These Medications        Disp Refills Start End    testosterone cypionate (DEPOTESTOTERONE CYPIONATE) 200 mg/mL injection 10 mL 1 2017 11/3/2017    Inject 1.5 mLs (300 mg total) into the muscle every 14 (fourteen) days. - Intramuscular    Pharmacy: [place] [id number] [address] Ph #: [phone]       Notes to Pharmacy: Dispense with 23g and 18g needles and 3ml syringes.      [facility]     [facility] Nurse Care Line -  Assistance  Unless otherwise directed by your provider, please contact [facility], our nurse care line that is available for  assistance.     Registered nurses in the [facility] provide: appointment scheduling, clinical advisement, health education, and other advisory services.  Call: [phone] (toll free)               Medications           Message regarding Medications     Verify the changes and/or additions to your medication regime listed below are the same as discussed with your clinician today.  If any of these changes or additions are incorrect, please notify your healthcare provider.        START taking these NEW medications        Refills    testosterone cypionate (DEPOTESTOTERONE CYPIONATE) 200 mg/mL injection 1     Sig: Inject 1.5 mLs (300 mg total) into the muscle every 14 (fourteen) days.    Class: Normal    Route: Intramuscular      STOP taking these medications     testosterone cypionate (DEPOTESTOTERONE CYPIONATE) 100 mg/mL injection Inject 1 mL (100 mg total) into the muscle every 14 (fourteen) days.           Verify that the below list of medications is an accurate representation of the medications you are currently taking.  If none reported, the list may be blank. If incorrect, please contact your healthcare provider. Carry this list with you in case of emergency.           Current Medications     CHOLECALCIFEROL, VITAMIN D3, (VITAMIN D3 ORAL) Take 600 mg by mouth once daily.    dextroamphetamine-amphetamine (ADDERALL) 5 mg Tab Take 5 mg by mouth 2 (two) times daily.    DOCOSAHEXANOIC ACID/EPA (FISH OIL ORAL) Take 1 capsule by mouth once daily.    doxycycline (VIBRA-TABS) 100 MG tablet TAKE ONE TABLET BY MOUTH ONE TIME DAILY    GLUC GONZALEZ/CHONDRO GONZALEZ A/VIT C/MN (GLUCOSAMINE CHONDROITIN MAXSTR ORAL) Take 1 tablet by mouth 2 (two) times daily.    guanfacine 1 mg Tb24 Take 1 tablet by mouth once daily.    lancets (ONE TOUCH DELICA LANCETS) 33 gauge Misc 1 lancet by Misc.(Non-Drug; Combo Route) route Daily.    lisinopril (PRINIVIL,ZESTRIL) 40 MG tablet TAKE ONE TABLET BY MOUTH ONE TIME DAILY    MAGNESIUM ORAL Take 1 tablet by mouth once daily.    metformin (GLUCOPHAGE) 500 MG tablet TAKE ONE TABLET BY MOUTH DAILY WITH BREAKFAST    modafinil (PROVIGIL) 200 MG Tab 1 pill PO in AM and the second pill early afternoon PRN sleepiness.    multivitamin capsule Take 1 capsule by mouth once daily.    oxybutynin (DITROPAN) 5 MG Tab Take 1 tablet (5 mg total) by mouth every evening.    UBIDECARENONE (COENZYME Q10 ORAL) Take 1 tablet by mouth once daily.    VITAMIN K2 ORAL Take 1 tablet by mouth once daily.    tadalafil (CIALIS) 5 MG tablet Take 5 mg by mouth once daily.    testosterone cypionate (DEPOTESTOTERONE CYPIONATE) 200 mg/mL  "injection Inject 1.5 mLs (300 mg total) into the muscle every 14 (fourteen) days.           Clinical Reference Information           Your Vitals Were     BP Pulse Height Weight BMI    155/90 58 6' 1" (1.854 m) 96.8 kg (213 lb 6.5 oz) 28.16 kg/m2      Blood Pressure          Most Recent Value    BP  (!)  155/90      Allergies as of 5/5/2017     No Known Allergies      Immunizations Administered on Date of Encounter - 5/5/2017     None      Orders Placed During Today's Visit     Future Labs/Procedures Expected by Expires    Testosterone  5/5/2017 7/4/2018      Language Assistance Services     ATTENTION: Language assistance services are available, free of charge. Please call [phone].      ATENCIÓN: Si habla bk, tiene a alvarez disposición servicios gratuitos de asistencia lingüística. Llame al [phone].     CHÚ Ý: N?u b?n nói Ti?ng Vi?t, có các d?ch v? h? tr? ngôn ng? mi?n phí dành cho b?n. G?i s? [phone].         [facility] complies with applicable Federal civil rights laws and does not discriminate on the basis of race, color, national origin, age, disability, or sex.        "

## 2017-05-12 ENCOUNTER — TELEPHONE (OUTPATIENT)
Dept: UROLOGY | Facility: CLINIC | Age: 62
End: 2017-05-12

## 2017-05-12 ENCOUNTER — PATIENT MESSAGE (OUTPATIENT)
Dept: UROLOGY | Facility: CLINIC | Age: 62
End: 2017-05-12

## 2017-05-12 NOTE — TELEPHONE ENCOUNTER
Spoke to Rupal from  [facility] and she will fax the authorization on Testosteron cyp , insurance is only covering it from 03/13/17 to 11/8/2017.Patient advised of the approval/ds

## 2017-06-02 ENCOUNTER — LAB VISIT (OUTPATIENT)
Dept: LAB | Facility: HOSPITAL | Age: 62
End: 2017-06-02
Attending: UROLOGY
Payer: COMMERCIAL

## 2017-06-02 DIAGNOSIS — E29.1 HYPOGONADISM MALE: ICD-10-CM

## 2017-06-02 LAB — TESTOST SERPL-MCNC: 1402 NG/DL

## 2017-06-02 PROCEDURE — 36415 COLL VENOUS BLD VENIPUNCTURE: CPT | Mod: PO

## 2017-06-02 PROCEDURE — 84403 ASSAY OF TOTAL TESTOSTERONE: CPT

## 2017-06-30 ENCOUNTER — OFFICE VISIT (OUTPATIENT)
Dept: PSYCHIATRY | Facility: CLINIC | Age: 62
End: 2017-06-30
Payer: COMMERCIAL

## 2017-06-30 VITALS
WEIGHT: 210 LBS | HEART RATE: 59 BPM | SYSTOLIC BLOOD PRESSURE: 144 MMHG | DIASTOLIC BLOOD PRESSURE: 81 MMHG | BODY MASS INDEX: 27.83 KG/M2 | HEIGHT: 73 IN

## 2017-06-30 DIAGNOSIS — I10 ESSENTIAL HYPERTENSION: ICD-10-CM

## 2017-06-30 DIAGNOSIS — F90.2 ATTENTION DEFICIT HYPERACTIVITY DISORDER (ADHD), COMBINED TYPE: Primary | ICD-10-CM

## 2017-06-30 PROCEDURE — 99999 PR PBB SHADOW E&M-EST. PATIENT-LVL II: CPT | Mod: PBBFAC,,, | Performed by: PSYCHIATRY & NEUROLOGY

## 2017-06-30 PROCEDURE — 99214 OFFICE O/P EST MOD 30 MIN: CPT | Mod: S$GLB,,, | Performed by: PSYCHIATRY & NEUROLOGY

## 2017-06-30 RX ORDER — GUANFACINE 1 MG/1
1 TABLET, EXTENDED RELEASE ORAL DAILY
Qty: 30 TABLET | Refills: 5 | Status: SHIPPED | OUTPATIENT
Start: 2017-06-30 | End: 2017-08-04

## 2017-06-30 RX ORDER — DEXTROAMPHETAMINE SACCHARATE, AMPHETAMINE ASPARTATE, DEXTROAMPHETAMINE SULFATE AND AMPHETAMINE SULFATE 1.25; 1.25; 1.25; 1.25 MG/1; MG/1; MG/1; MG/1
1 TABLET ORAL 2 TIMES DAILY
Qty: 60 TABLET | Refills: 0 | Status: SHIPPED | OUTPATIENT
Start: 2017-06-30 | End: 2017-08-08 | Stop reason: SDUPTHER

## 2017-06-30 NOTE — PROGRESS NOTES
Ambulatory Psychiatry Established Patient Follow-up Note      Chief Complaint  presents for followup of focus problems    Time Spent  20 minutes    HISTORY  Interval History  Doing well. Notes that medication is willing. Restarted testosterone recently which he notes that has been elevating his blood pressure. Mood is good. Work is going better. Still has some fatigue, taking modafinil but only on the weekend per his report. Notes fatigue even after using cpap machine. Complains of memory lapses, with word finding difficulty and difficulty naming things. Still able to work, runs marathons, going on various athletic and outside adventuring outings in other states over the next several weeks.     ROS   Review of Systems   Constitutional: Negative.    HENT: Negative.    Eyes: Negative.    Respiratory: Negative.    Cardiovascular: Negative.  Negative for chest pain.   Gastrointestinal: Negative.    Genitourinary: Negative.    Musculoskeletal: Negative.    Skin: Negative.    Neurological: Negative.    Endo/Heme/Allergies: Negative.        Psych ROS covered elsewhere in note (HPI)    PFSH  Past Medical History reviewed: Yes  Family History reviewed: No  Social History reviewed: Yes  Medications/problem list/allergies reviewed: Yes    Medications    Scheduled and PRN Medications     Current Outpatient Prescriptions:     CHOLECALCIFEROL, VITAMIN D3, (VITAMIN D3 ORAL), Take 600 mg by mouth once daily., Disp: , Rfl:     dextroamphetamine-amphetamine (ADDERALL) 5 mg Tab, Take 5 mg by mouth 2 (two) times daily., Disp: 60 tablet, Rfl: 0    DOCOSAHEXANOIC ACID/EPA (FISH OIL ORAL), Take 1 capsule by mouth once daily., Disp: , Rfl:     doxycycline (VIBRA-TABS) 100 MG tablet, TAKE ONE TABLET BY MOUTH ONE TIME DAILY, Disp: 30 tablet, Rfl: 11    GLUC GONZALEZ/CHONDRO GONZALEZ A/VIT C/MN (GLUCOSAMINE CHONDROITIN MAXSTR ORAL), Take 1 tablet by mouth 2 (two) times daily., Disp: , Rfl:     guanfacine 1 mg Tb24, Take 1 tablet by mouth once  "daily., Disp: 30 tablet, Rfl: 5    lancets (ONE TOUCH DELICA LANCETS) 33 gauge Misc, 1 lancet by Misc.(Non-Drug; Combo Route) route Daily., Disp: 50 each, Rfl: 11    lisinopril (PRINIVIL,ZESTRIL) 40 MG tablet, TAKE ONE TABLET BY MOUTH ONE TIME DAILY, Disp: 30 tablet, Rfl: 11    MAGNESIUM ORAL, Take 1 tablet by mouth once daily., Disp: , Rfl:     metformin (GLUCOPHAGE) 500 MG tablet, TAKE ONE TABLET BY MOUTH DAILY WITH BREAKFAST, Disp: 30 tablet, Rfl: 11    modafinil (PROVIGIL) 200 MG Tab, 1 pill PO in AM and the second pill early afternoon PRN sleepiness., Disp: 60 tablet, Rfl: 5    multivitamin capsule, Take 1 capsule by mouth once daily., Disp: , Rfl:     oxybutynin (DITROPAN) 5 MG Tab, Take 1 tablet (5 mg total) by mouth every evening., Disp: 30 tablet, Rfl: 11    tadalafil (CIALIS) 5 MG tablet, Take 5 mg by mouth once daily., Disp: , Rfl:     testosterone cypionate (DEPOTESTOTERONE CYPIONATE) 200 mg/mL injection, Inject 1.5 mLs (300 mg total) into the muscle every 14 (fourteen) days., Disp: 10 mL, Rfl: 1    UBIDECARENONE (COENZYME Q10 ORAL), Take 1 tablet by mouth once daily., Disp: , Rfl:     VITAMIN K2 ORAL, Take 1 tablet by mouth once daily., Disp: , Rfl:     Allergies  Review of patient's allergies indicates:  No Known Allergies    EXAM  VITALS   Vitals:    06/30/17 1248   BP: (!) 144/81   Pulse: (!) 59   Weight: 95.3 kg (210 lb)   Height: 6' 1" (1.854 m)       RELEVANT LABS/STUDIES:    PSYCHIATRIC EXAMINATION  Appearance: well groomed, appearing healthy and of stated age    Behavior: cooperative, pleasant, no psychomotor agitation or retardation, fidgety  Speech: normal rate, rhythm, prosody, volume, talkative, interrupts frequently  Mood: good  Affect: normal range  Thought Process: linear, logical, goal directed, somewhat obsessive  Thought Content: negative for suicidal ideation, homicidal ideation, delusions or hallucinations.  Associations: intact  Memory: grossly intact  Level of " Consciousness/Orientation: grossly intact  Fund of Knowledge: good  Attention: good.  Language: fluent, able to name abstract and concrete objects.  Insight: fair  Judgment: fair    Psychomotor signs: no involuntary movements or tremor  Gait: normal    Medical Decision Making    IMPRESSION   59 yo  male  reporting lifelong struggles with attention, now presenting with worsening attention over past several years. Pt reports hx of possible small stroke several years ago which may have led to worsening sx; however review of patient's chart show no clear evidence for significant cardivascular or neurological disease apart from mild hypertension and a ? Small cerebral cavernous malformation. Patient does meet critieria for ADHD with numerous sx of inattention, forgetfulness, distractibility and restlessness, which have been present since childhood. Pt does display significant anxiety about his health and obsessive compulsive personality traits, with extensive research and obsessive behavior towards health monitoring and treatment. However patient is not significantly anxious in affect and denies anxiety apart form health concerns. Therefore Generalized Anxiety Disorder not clearly playing a role. No clear cognitive impairment, MOCA 28/30 on intake, with loss of one point on delayed recall and one point on attention for letter list response. Started guanfacine with mild benefit to hyperactivity, but focus problems unchanged. Had reevaluation by cardiology and PCP, including echo, no concern for bp or other cardiovascular issues that would contraindicate stimulant trial.  Had no response to low dose ritalin, returned after trial of adderall, reporting good benefit with no side effects. Patient returns today reports good control of ADHD sx but raised concern by concurrent use of modafinil and elevated bp. Reviwed [place] prescription monitoring program which shows montly refills of both medications including  modafinial at 400 mg (maximum dose)    DIAGNOSES  Attention Deficit Disorder, combined type        PLAN  -explained concern about elevated bp, which is mild but which appears to be consistently present since starting adderall. Explained that studies suggest adderall use is safe in patient without preexisting cardiac disease but that long term safety is not yet established. Furthermore would not be willing to continue if having sustained bp elevation. Pt agreed to stop modfinil for now and to follow up [facility] PCP and cardiologist about hypertension and advisability of continuing stimulant therapy. BP may drop after patient stops modfinial.  -explained that he should not be using adderall and modafinil concurrently givne overlap and possiblilty for increased cardiovascular effects and addiction. Pt agreed to stop modafinil.  -gave 1 refill for adderall 5 mg bid, discussed risks and benefits, patient consents for treatment.  -continu eguanfacine XR to 2 mg daily.   -set up appointment with psychologist Khurram Bush to review hx and discuss behavioral treatment of ADD.  -return for follow up in three weeks to review bp and plans for further treatment.     More than 50% of the time was spent on counseling and coordination of care.  Psychoeducation, coordination with other phsycians

## 2017-07-21 ENCOUNTER — PATIENT MESSAGE (OUTPATIENT)
Dept: CARDIOLOGY | Facility: CLINIC | Age: 62
End: 2017-07-21

## 2017-08-01 ENCOUNTER — PATIENT MESSAGE (OUTPATIENT)
Dept: UROLOGY | Facility: CLINIC | Age: 62
End: 2017-08-01

## 2017-08-01 DIAGNOSIS — E29.1 HYPOGONADISM MALE: Primary | ICD-10-CM

## 2017-08-01 NOTE — TELEPHONE ENCOUNTER
I ordered labs, but let him know the testosterone and possibly the PSA won't be back before Friday. He may want to reschedule.

## 2017-08-02 ENCOUNTER — LAB VISIT (OUTPATIENT)
Dept: LAB | Facility: HOSPITAL | Age: 62
End: 2017-08-02
Attending: UROLOGY
Payer: COMMERCIAL

## 2017-08-02 DIAGNOSIS — E29.1 HYPOGONADISM MALE: ICD-10-CM

## 2017-08-02 DIAGNOSIS — F90.2 ATTENTION DEFICIT HYPERACTIVITY DISORDER (ADHD), COMBINED TYPE: ICD-10-CM

## 2017-08-02 LAB
ESTRADIOL SERPL-MCNC: 33 PG/ML
PROSTATE SPECIFIC ANTIGEN, TOTAL: 14.6 NG/ML
PSA FREE MFR SERPL: 17.88 %
PSA FREE SERPL-MCNC: 2.61 NG/ML

## 2017-08-02 PROCEDURE — 82670 ASSAY OF TOTAL ESTRADIOL: CPT

## 2017-08-02 PROCEDURE — 84153 ASSAY OF PSA TOTAL: CPT

## 2017-08-02 PROCEDURE — 36415 COLL VENOUS BLD VENIPUNCTURE: CPT | Mod: PO

## 2017-08-02 PROCEDURE — 84270 ASSAY OF SEX HORMONE GLOBUL: CPT

## 2017-08-02 RX ORDER — DEXTROAMPHETAMINE SACCHARATE, AMPHETAMINE ASPARTATE, DEXTROAMPHETAMINE SULFATE AND AMPHETAMINE SULFATE 1.25; 1.25; 1.25; 1.25 MG/1; MG/1; MG/1; MG/1
1 TABLET ORAL 2 TIMES DAILY
Qty: 60 TABLET | Refills: 0 | OUTPATIENT
Start: 2017-08-02 | End: 2017-09-01

## 2017-08-02 NOTE — TELEPHONE ENCOUNTER
Spoke with pt to inform him that the orders for his blood work are in but 2 of the test results may not be in by his scheduled appointment date. Pt stated that he will keep his scheduled appointment.

## 2017-08-04 ENCOUNTER — OFFICE VISIT (OUTPATIENT)
Dept: UROLOGY | Facility: CLINIC | Age: 62
End: 2017-08-04
Payer: COMMERCIAL

## 2017-08-04 VITALS
DIASTOLIC BLOOD PRESSURE: 57 MMHG | WEIGHT: 210 LBS | BODY MASS INDEX: 27.83 KG/M2 | SYSTOLIC BLOOD PRESSURE: 101 MMHG | HEIGHT: 73 IN | HEART RATE: 62 BPM

## 2017-08-04 DIAGNOSIS — R97.20 ELEVATED PSA: ICD-10-CM

## 2017-08-04 DIAGNOSIS — E29.1 HYPOGONADISM MALE: Primary | ICD-10-CM

## 2017-08-04 DIAGNOSIS — N40.1 BENIGN PROSTATIC HYPERPLASIA WITH LOWER URINARY TRACT SYMPTOMS, SYMPTOM DETAILS UNSPECIFIED: Primary | ICD-10-CM

## 2017-08-04 DIAGNOSIS — N40.1 BENIGN NON-NODULAR PROSTATIC HYPERPLASIA WITH LOWER URINARY TRACT SYMPTOMS: ICD-10-CM

## 2017-08-04 DIAGNOSIS — F90.2 ATTENTION DEFICIT HYPERACTIVITY DISORDER (ADHD), COMBINED TYPE: ICD-10-CM

## 2017-08-04 PROCEDURE — 99214 OFFICE O/P EST MOD 30 MIN: CPT | Mod: S$GLB,,, | Performed by: UROLOGY

## 2017-08-04 PROCEDURE — 99999 PR PBB SHADOW E&M-EST. PATIENT-LVL III: CPT | Mod: PBBFAC,,, | Performed by: UROLOGY

## 2017-08-04 PROCEDURE — 3008F BODY MASS INDEX DOCD: CPT | Mod: S$GLB,,, | Performed by: UROLOGY

## 2017-08-04 RX ORDER — GUANFACINE 2 MG/1
1 TABLET, EXTENDED RELEASE ORAL DAILY
Refills: 5 | COMMUNITY
Start: 2017-07-23 | End: 2017-09-15 | Stop reason: SDUPTHER

## 2017-08-04 RX ORDER — TADALAFIL 5 MG/1
5 TABLET ORAL DAILY
Qty: 30 TABLET | Refills: 11 | Status: SHIPPED | OUTPATIENT
Start: 2017-08-04 | End: 2018-11-28 | Stop reason: SDUPTHER

## 2017-08-04 RX ORDER — TADALAFIL 5 MG/1
5 TABLET ORAL DAILY
Qty: 30 TABLET | Refills: 6 | Status: CANCELLED | OUTPATIENT
Start: 2017-08-04

## 2017-08-04 RX ORDER — DEXTROAMPHETAMINE SACCHARATE, AMPHETAMINE ASPARTATE, DEXTROAMPHETAMINE SULFATE AND AMPHETAMINE SULFATE 1.25; 1.25; 1.25; 1.25 MG/1; MG/1; MG/1; MG/1
1 TABLET ORAL 2 TIMES DAILY
Qty: 60 TABLET | Refills: 0 | OUTPATIENT
Start: 2017-08-04 | End: 2017-09-03

## 2017-08-04 NOTE — PROGRESS NOTES
"Subjective:      Dominguez Zapata is a 62 y.o. male who returns today regarding his hypogonadism and elevated PSA.    He is currently on testosterone IM 100mg every 1 week (his preferred schedule). Previously tried both clomid and testopel but opted to use injections instead. Pre-treatment symptoms included decreased libido.     He also has h/o BPH treated w/ Urolift at [facility] in 2014. More recently he has been bothered by nocturia and started nightly ditropan in February. He states this has been effective at reducing nocturia.    He also has h/o elevated PSA, s/p biopsy in [place] in 2009 (negative) and MRI in 2015 (low probability of tumor).  His highest known PSA is 7.9 in Jan 2016. Last PSA was 4.8 in Jan 2017.    The following portions of the patient's history were reviewed and updated as appropriate: allergies, current medications, past family history, past medical history, past social history, past surgical history and problem list.    Review of Systems  A comprehensive multipoint review of systems was negative except as otherwise stated in the HPI.     Objective:   Vitals: BP (!) 101/57 (BP Location: Left arm, Patient Position: Sitting, BP Method: Automatic)   Pulse 62   Ht 6' 1" (1.854 m)   Wt 95.3 kg (210 lb)   BMI 27.71 kg/m²     Physical Exam   General: alert and oriented, no acute distress  Respiratory: Symmetric expansion, non-labored breathing  Neuro: no gross deficits  Psych: normal judgment and insight, normal mood/affect and non-anxious    Lab Review   Urinalysis demonstrates negative for all components  Lab Results   Component Value Date    WBC 6.42 03/16/2017    HGB 16.3 03/16/2017    HCT 48.9 03/16/2017    MCV 95 03/16/2017     03/16/2017     Lab Results   Component Value Date    CREATININE 0.9 01/10/2017    BUN 22 01/10/2017     Component      Latest Ref Rng & Units 8/2/2017   PSA Total      0.00 - 4.00 ng/mL 14.6 (H)   PSA, Free      0.01 - 1.50 ng/mL 2.61 (H)   PSA, Free Pct      " Not established % 17.88     Component Testosterone, Total   Latest Ref Rng & Units 195.0 - 1138.0 ng/dL   5/2/2017 446   4/13/2017 657   3/16/2017 1078       Assessment and Plan:   1. Hypogonadism male  -- Continue injections - 100mg every week  -- FU pending T panel and repeat next week prior to injection (pending test done day after injection)    2. Nocturia  -- Continue ditropan qhs  -- Continue daily Cialis    3. Elevated PSA  -- Suspect benign etiology given his history  -- Repeat in 6 mos

## 2017-08-05 LAB
ALBUMIN SERPL-MCNC: 4.1 G/DL (ref 3.6–5.1)
SHBG SERPL-SCNC: 19 NMOL/L (ref 22–77)
TESTOST FREE SERPL-MCNC: 218.6 PG/ML (ref 46–224)
TESTOST SERPL-MCNC: 884 NG/DL (ref 250–1100)
TESTOSTERONE.FREE+WB SERPL-MCNC: 411.5 NG/DL (ref 110–575)

## 2017-08-08 ENCOUNTER — LAB VISIT (OUTPATIENT)
Dept: LAB | Facility: HOSPITAL | Age: 62
End: 2017-08-08
Attending: UROLOGY
Payer: COMMERCIAL

## 2017-08-08 DIAGNOSIS — F90.2 ATTENTION DEFICIT HYPERACTIVITY DISORDER (ADHD), COMBINED TYPE: ICD-10-CM

## 2017-08-08 DIAGNOSIS — E29.1 HYPOGONADISM MALE: ICD-10-CM

## 2017-08-08 DIAGNOSIS — R97.20 ELEVATED PSA: ICD-10-CM

## 2017-08-08 LAB
PROSTATE SPECIFIC ANTIGEN, TOTAL: 11.1 NG/ML
PSA FREE MFR SERPL: 22.16 %
PSA FREE SERPL-MCNC: 2.46 NG/ML

## 2017-08-08 PROCEDURE — 36415 COLL VENOUS BLD VENIPUNCTURE: CPT | Mod: PO

## 2017-08-08 PROCEDURE — 84270 ASSAY OF SEX HORMONE GLOBUL: CPT

## 2017-08-08 PROCEDURE — 84153 ASSAY OF PSA TOTAL: CPT

## 2017-08-08 RX ORDER — DEXTROAMPHETAMINE SACCHARATE, AMPHETAMINE ASPARTATE, DEXTROAMPHETAMINE SULFATE AND AMPHETAMINE SULFATE 1.25; 1.25; 1.25; 1.25 MG/1; MG/1; MG/1; MG/1
1 TABLET ORAL 2 TIMES DAILY
Qty: 60 TABLET | Refills: 0 | Status: SHIPPED | OUTPATIENT
Start: 2017-08-08 | End: 2017-09-15

## 2017-08-08 RX ORDER — DEXTROAMPHETAMINE SACCHARATE, AMPHETAMINE ASPARTATE, DEXTROAMPHETAMINE SULFATE AND AMPHETAMINE SULFATE 1.25; 1.25; 1.25; 1.25 MG/1; MG/1; MG/1; MG/1
1 TABLET ORAL 2 TIMES DAILY
Qty: 60 TABLET | Refills: 0 | Status: CANCELLED | OUTPATIENT
Start: 2017-08-08 | End: 2017-09-07

## 2017-08-11 ENCOUNTER — TELEPHONE (OUTPATIENT)
Dept: UROLOGY | Facility: CLINIC | Age: 62
End: 2017-08-11

## 2017-08-11 LAB
ALBUMIN SERPL-MCNC: 4.1 G/DL (ref 3.6–5.1)
SHBG SERPL-SCNC: 15 NMOL/L (ref 22–77)
TESTOST FREE SERPL-MCNC: 103.3 PG/ML (ref 46–224)
TESTOST SERPL-MCNC: 422 NG/DL (ref 250–1100)
TESTOSTERONE.FREE+WB SERPL-MCNC: 194.4 NG/DL (ref 110–575)

## 2017-08-11 NOTE — TELEPHONE ENCOUNTER
----- Message from Shabnam Toledo sent at 8/10/2017  3:27 PM CDT -----  Contact: Mendy with [facility]  X  1st Request  _  2nd Request  _  3rd Request        Who: Mendy with [facility]    Why: Pt needs a PA for the tadalafil (CIALIS) 5 MG tablet. Mendy states a fax was sent on 08/04. Mendy states that she will refax the request. Please call with any questions,thanks!    What Number to Call Back: [phone]    When to Expect a call back: (With in 24 hours)

## 2017-08-24 DIAGNOSIS — G47.19 EXCESSIVE DAYTIME SLEEPINESS: ICD-10-CM

## 2017-08-24 RX ORDER — MODAFINIL 200 MG/1
TABLET ORAL
Qty: 60 TABLET | Refills: 5 | Status: SHIPPED | OUTPATIENT
Start: 2017-08-24 | End: 2018-04-06 | Stop reason: SDUPTHER

## 2017-09-13 ENCOUNTER — OFFICE VISIT (OUTPATIENT)
Dept: CARDIOLOGY | Facility: CLINIC | Age: 62
End: 2017-09-13
Payer: COMMERCIAL

## 2017-09-13 VITALS
DIASTOLIC BLOOD PRESSURE: 76 MMHG | WEIGHT: 223.56 LBS | SYSTOLIC BLOOD PRESSURE: 147 MMHG | BODY MASS INDEX: 28.69 KG/M2 | HEIGHT: 74 IN | HEART RATE: 65 BPM

## 2017-09-13 DIAGNOSIS — E78.5 HYPERLIPIDEMIA, UNSPECIFIED HYPERLIPIDEMIA TYPE: ICD-10-CM

## 2017-09-13 DIAGNOSIS — I10 ESSENTIAL HYPERTENSION: Primary | ICD-10-CM

## 2017-09-13 DIAGNOSIS — I77.810 ASCENDING AORTA DILATATION: Chronic | ICD-10-CM

## 2017-09-13 DIAGNOSIS — R73.03 PREDIABETES: ICD-10-CM

## 2017-09-13 PROCEDURE — 3078F DIAST BP <80 MM HG: CPT | Mod: S$GLB,,, | Performed by: INTERNAL MEDICINE

## 2017-09-13 PROCEDURE — 3077F SYST BP >= 140 MM HG: CPT | Mod: S$GLB,,, | Performed by: INTERNAL MEDICINE

## 2017-09-13 PROCEDURE — 99214 OFFICE O/P EST MOD 30 MIN: CPT | Mod: S$GLB,,, | Performed by: INTERNAL MEDICINE

## 2017-09-13 PROCEDURE — 99999 PR PBB SHADOW E&M-EST. PATIENT-LVL III: CPT | Mod: PBBFAC,,, | Performed by: INTERNAL MEDICINE

## 2017-09-13 PROCEDURE — 3008F BODY MASS INDEX DOCD: CPT | Mod: S$GLB,,, | Performed by: INTERNAL MEDICINE

## 2017-09-13 RX ORDER — CYSTEINE HCL 500 MG
CAPSULE ORAL DAILY
COMMUNITY
End: 2018-08-17

## 2017-09-13 NOTE — PROGRESS NOTES
"Subjective:   Patient ID:  Dominguez Zapata is a 62 y.o. male who presents for evaluation of Essential hypertension      HPI: Mr. Zapata is back after a little more than a year, partially at the request of his psychiatrist.  He again brings hundreds of BP readings, the vast majority of which are within a normal range.  He is almost alarmingly tanned and when I point this out he states that he was "using a cream to increase his melatonin (sic) production".    He also asked me about some supplements he's taken or has considered taking.  One of them is Cytomel (T3).  I was rivas about how bad of an idea I believe it is for someone without a diagnosis of hypothyroidism to take any thyroid supplementation, much less Cytomel.    He denies chest discomfort, ALEXANDER, palpitations, PND/orthopnea, lightheadedness and syncope.      June 2016 HPI:  Back for follow-up after > 2 years.  He's lost some weight and is back on lisinopril and doing well except for occasional      Feb 2014 HPI: Mr. Zapata is back and shows me literally hundreds of BP readings over the past year. The majority are normal but there have been some rare high spikes. There also have been episodes of systolics in the 80s and 90s. His main problem appears to be migraines, and he also has seen a neurologist about what he perceives as a balance problem. He's running regularly and recently ran a 5k in about 24 minutes (he used to run it in 18 when he was 39).    Past Medical History:   Diagnosis Date    Colon polyps     Hearing loss in right ear     Low serum testosterone level     Migraine     Mixed hyperlipidemia     Hyperlipidemia    NS (nuclear sclerosis) 3/10/2015    Prediabetes     Stroke     Unspecified essential hypertension     Essential hypertension       Past Surgical History:   Procedure Laterality Date    bone anchored hearing aid Right 06/23/2015    HERNIA REPAIR      PROSTATE BIOPSY      PROSTATE BIOPSY  1/22/10    PSA 4.12, " negative for malignancy, some chronic inflammation noted    PROSTATE SURGERY      Urolift       Social History   Substance Use Topics    Smoking status: Never Smoker    Smokeless tobacco: Never Used    Alcohol use Yes      Comment: social       Family History   Problem Relation Age of Onset    Heart disease Mother     Diabetes Mother     Kidney disease Mother      Dialysis    Hypertension Mother     Diabetes Father     Heart disease Father      Valvular disease    Hypertension Father     Amblyopia Neg Hx     Blindness Neg Hx     Cataracts Neg Hx     Glaucoma Neg Hx     Macular degeneration Neg Hx     Retinal detachment Neg Hx     Strabismus Neg Hx        Current Outpatient Prescriptions   Medication Sig    CHOLECALCIFEROL, VITAMIN D3, (VITAMIN D3 ORAL) Take 600 mg by mouth once daily.    DOCOSAHEXANOIC ACID/EPA (FISH OIL ORAL) Take 1 capsule by mouth once daily.    doxycycline (VIBRA-TABS) 100 MG tablet TAKE ONE TABLET BY MOUTH ONE TIME DAILY    GLUC GONZALEZ/CHONDRO GONZALEZ A/VIT C/MN (GLUCOSAMINE CHONDROITIN MAXSTR ORAL) Take 1 tablet by mouth 2 (two) times daily.    guanfacine 2 mg Tb24 Take 1 tablet by mouth once daily.    lancets (ONE TOUCH DELICA LANCETS) 33 gauge Misc 1 lancet by Misc.(Non-Drug; Combo Route) route Daily.    lisinopril (PRINIVIL,ZESTRIL) 40 MG tablet TAKE ONE TABLET BY MOUTH ONE TIME DAILY    MAGNESIUM ORAL Take 1 tablet by mouth once daily.    modafinil (PROVIGIL) 200 MG Tab 1 pill PO in AM and the second pill early afternoon PRN sleepiness.    multivitamin capsule Take 1 capsule by mouth once daily.    oxybutynin (DITROPAN) 5 MG Tab Take 1 tablet (5 mg total) by mouth every evening.    tadalafil (CIALIS) 5 MG tablet Take 1 tablet (5 mg total) by mouth once daily.    taurine 1,000 mg Cap Take by mouth once daily.     testosterone cypionate (DEPOTESTOTERONE CYPIONATE) 200 mg/mL injection Inject 1.5 mLs (300 mg total) into the muscle every 14 (fourteen) days.     UBIDECARENONE (COENZYME Q10 ORAL) Take 1 tablet by mouth once daily.    VITAMIN K2 ORAL Take 1 tablet by mouth once daily.    dextroamphetamine-amphetamine (ADDERALL) 5 mg Tab Take 5 mg by mouth 2 (two) times daily.     No current facility-administered medications for this visit.        Review of patient's allergies indicates:  No Known Allergies    Review of Systems   Constitution: Negative.   HENT: Negative.    Eyes: Negative.    Cardiovascular: Negative.  Negative for chest pain, dyspnea on exertion, near-syncope, orthopnea and palpitations.   Respiratory: Negative.  Negative for cough, hemoptysis and shortness of breath.    Endocrine: Negative.    Hematologic/Lymphatic: Negative.    Skin: Negative.    Musculoskeletal: Negative.    Gastrointestinal: Negative.    Genitourinary: Negative.    Neurological: Negative.    Psychiatric/Behavioral: Negative.      Objective:   Physical Exam   Constitutional: He is oriented to person, place, and time. He appears well-developed and well-nourished.   HENT:   Head: Normocephalic and atraumatic.   Mouth/Throat: Oropharynx is clear and moist.   Eyes: Conjunctivae and EOM are normal. No scleral icterus.   Neck: Normal range of motion. Neck supple. No JVD present.   Cardiovascular: Normal rate, regular rhythm, normal heart sounds and intact distal pulses.  Exam reveals no gallop and no friction rub.    No murmur heard.  Pulmonary/Chest: Effort normal and breath sounds normal. He has no wheezes. He has no rales.   Abdominal: Soft. Bowel sounds are normal. He exhibits no distension. There is no tenderness.   Musculoskeletal: Normal range of motion. He exhibits no edema.   Neurological: He is alert and oriented to person, place, and time.   Skin: Skin is warm and dry. No rash noted. No erythema.   Psychiatric: He has a normal mood and affect. His behavior is normal. Judgment and thought content normal.   Vitals reviewed.      Lab Results   Component Value Date    WBC 6.42  03/16/2017    HGB 16.3 03/16/2017    HCT 48.9 03/16/2017    MCV 95 03/16/2017     03/16/2017         Chemistry        Component Value Date/Time     01/10/2017 0905    K 4.2 01/10/2017 0905     01/10/2017 0905    CO2 28 01/10/2017 0905    BUN 22 01/10/2017 0905    CREATININE 0.9 01/10/2017 0905     (H) 01/10/2017 0905        Component Value Date/Time    CALCIUM 9.1 01/10/2017 0905    ALKPHOS 57 01/10/2017 0905    AST 28 01/10/2017 0905    ALT 24 01/10/2017 0905    BILITOT 0.7 01/10/2017 0905    ESTGFRAFRICA >60.0 01/10/2017 0905    EGFRNONAA >60.0 01/10/2017 0905            Lab Results   Component Value Date    CHOL 217 (H) 01/10/2017    CHOL 185 07/06/2016    CHOL 199 01/12/2016     Lab Results   Component Value Date    HDL 47 01/10/2017    HDL 40 07/06/2016    HDL 42 01/12/2016     Lab Results   Component Value Date    LDLCALC 145.8 01/10/2017    LDLCALC 122.6 07/06/2016    LDLCALC 133.0 01/12/2016     Lab Results   Component Value Date    TRIG 121 01/10/2017    TRIG 112 07/06/2016    TRIG 120 01/12/2016     Lab Results   Component Value Date    CHOLHDL 21.7 01/10/2017    CHOLHDL 21.6 07/06/2016    CHOLHDL 21.1 01/12/2016       Lab Results   Component Value Date    TSH 0.904 01/10/2017       Lab Results   Component Value Date    HGBA1C 6.3 (H) 01/10/2017         Assessment:     1. Essential hypertension    2. Hyperlipidemia, unspecified hyperlipidemia type    3. Ascending aorta dilatation    4. Prediabetes        Plan:     Mr. Zapata's HTN is benign and his aortic dilation is minimal.  I am not concerned about his taking Adderall or other psychoactive substances as prescribed by his mental health professional.    I would be happy to discuss his case or answer questions should they arise.    Mr. Zapata does not appear to appreciate the seriousness of glucose intolerance and has unwarranted concerns about taking metformin (he's concerned about it decreasing his growth hormone  levels).  While this will be a villarreal for Dr. Ahn to fight primarily, I made it clear to him that I do not believe his thinking on this matter has a proper balance of information and insight.    Continue current medicines.    Diet/exercise goals reinforced.    F/U 24 months

## 2017-09-15 ENCOUNTER — OFFICE VISIT (OUTPATIENT)
Dept: PSYCHIATRY | Facility: CLINIC | Age: 62
End: 2017-09-15
Payer: COMMERCIAL

## 2017-09-15 VITALS
SYSTOLIC BLOOD PRESSURE: 145 MMHG | HEART RATE: 62 BPM | BODY MASS INDEX: 29.24 KG/M2 | HEIGHT: 73 IN | DIASTOLIC BLOOD PRESSURE: 76 MMHG | WEIGHT: 220.63 LBS

## 2017-09-15 DIAGNOSIS — F90.2 ATTENTION DEFICIT HYPERACTIVITY DISORDER (ADHD), COMBINED TYPE: ICD-10-CM

## 2017-09-15 PROCEDURE — 99999 PR PBB SHADOW E&M-EST. PATIENT-LVL II: CPT | Mod: PBBFAC,,, | Performed by: PSYCHIATRY & NEUROLOGY

## 2017-09-15 PROCEDURE — 99214 OFFICE O/P EST MOD 30 MIN: CPT | Mod: S$GLB,,, | Performed by: PSYCHIATRY & NEUROLOGY

## 2017-09-15 PROCEDURE — 3078F DIAST BP <80 MM HG: CPT | Mod: S$GLB,,, | Performed by: PSYCHIATRY & NEUROLOGY

## 2017-09-15 PROCEDURE — 3008F BODY MASS INDEX DOCD: CPT | Mod: S$GLB,,, | Performed by: PSYCHIATRY & NEUROLOGY

## 2017-09-15 PROCEDURE — 3077F SYST BP >= 140 MM HG: CPT | Mod: S$GLB,,, | Performed by: PSYCHIATRY & NEUROLOGY

## 2017-09-15 RX ORDER — DEXTROAMPHETAMINE SACCHARATE, AMPHETAMINE ASPARTATE, DEXTROAMPHETAMINE SULFATE AND AMPHETAMINE SULFATE 1.25; 1.25; 1.25; 1.25 MG/1; MG/1; MG/1; MG/1
1.5 TABLET ORAL 2 TIMES DAILY
Qty: 90 TABLET | Refills: 0 | Status: SHIPPED | OUTPATIENT
Start: 2017-09-15 | End: 2017-10-15

## 2017-09-15 RX ORDER — GUANFACINE 2 MG/1
1 TABLET, EXTENDED RELEASE ORAL DAILY
Qty: 30 TABLET | Refills: 5 | Status: SHIPPED | OUTPATIENT
Start: 2017-09-15 | End: 2018-03-04 | Stop reason: SDUPTHER

## 2017-09-15 RX ORDER — DEXTROAMPHETAMINE SACCHARATE, AMPHETAMINE ASPARTATE, DEXTROAMPHETAMINE SULFATE AND AMPHETAMINE SULFATE 1.25; 1.25; 1.25; 1.25 MG/1; MG/1; MG/1; MG/1
1.5 TABLET ORAL 2 TIMES DAILY
Qty: 90 TABLET | Refills: 0 | Status: SHIPPED | OUTPATIENT
Start: 2017-11-14 | End: 2019-01-14

## 2017-09-15 RX ORDER — DEXTROAMPHETAMINE SACCHARATE, AMPHETAMINE ASPARTATE, DEXTROAMPHETAMINE SULFATE AND AMPHETAMINE SULFATE 1.25; 1.25; 1.25; 1.25 MG/1; MG/1; MG/1; MG/1
1.5 TABLET ORAL 2 TIMES DAILY
Qty: 90 TABLET | Refills: 0 | Status: SHIPPED | OUTPATIENT
Start: 2017-10-15 | End: 2017-11-14

## 2017-09-15 NOTE — PROGRESS NOTES
Ambulatory Psychiatry Established Patient Follow-up Note      Chief Complaint  presents for followup of focus problems    Time Spent  20 minutes    HISTORY  Interval History  Has seen his cardiologist since last visit, who  was unconcerned about his blood pressure or risk of taking stimulants. Reviewed [place] . He has continued taking adderall 5 mg bid. Also continues to get modafinil 200 mg bid filled monthly (despite concerns about him taking both stimulants at the same time.     Patient reports good control of ADHD sx and much better productivity with adderall, does think he needs a slightly higher dose as he he took the second dose earlier when struggling and found it to work much better than just one at a time. Is still taking modafinilon days when he does not use the adderall. Finds that the modafinil does not help him with attention and that adderall does not help him as much with making him feel awake throughout the day. Reports good function at work, denies any side effects.    Denies any cardiac symptoms, continues to run, hike, bike and engage in competitive races. Came back from 4 day hike all over [facility]. Going to hike the entire [place] in a few weeks. Patient comes in with printd out abstracts citing safety of modafinil in combination with adderall and also documenting what he before asserted, that they each serve different functions. Also brought in papers suggesting modafinil lowers guanfacine levels and requested to increase his guanfacine dose.           ROS   Review of Systems   Constitutional: Negative.    HENT: Negative.    Eyes: Negative.    Respiratory: Negative.    Cardiovascular: Negative.  Negative for chest pain.   Gastrointestinal: Negative.    Genitourinary: Negative.    Musculoskeletal: Negative.    Skin: Negative.    Neurological: Negative.    Endo/Heme/Allergies: Negative.        Psych ROS covered elsewhere in note (HPI)    PFSH  Past Medical History reviewed: Yes  Family  History reviewed: No  Social History reviewed: Yes  Medications/problem list/allergies reviewed: Yes    Medications    Scheduled and PRN Medications     Current Outpatient Prescriptions:     CHOLECALCIFEROL, VITAMIN D3, (VITAMIN D3 ORAL), Take 600 mg by mouth once daily., Disp: , Rfl:     dextroamphetamine-amphetamine (ADDERALL) 5 mg Tab, Take 5 mg by mouth 2 (two) times daily., Disp: 60 tablet, Rfl: 0    DOCOSAHEXANOIC ACID/EPA (FISH OIL ORAL), Take 1 capsule by mouth once daily., Disp: , Rfl:     doxycycline (VIBRA-TABS) 100 MG tablet, TAKE ONE TABLET BY MOUTH ONE TIME DAILY, Disp: 30 tablet, Rfl: 11    GLUC GONZALEZ/CHONDRO GONZALEZ A/VIT C/MN (GLUCOSAMINE CHONDROITIN MAXSTR ORAL), Take 1 tablet by mouth 2 (two) times daily., Disp: , Rfl:     guanfacine 2 mg Tb24, Take 1 tablet by mouth once daily., Disp: , Rfl: 5    lancets (ONE TOUCH DELICA LANCETS) 33 gauge Misc, 1 lancet by Misc.(Non-Drug; Combo Route) route Daily., Disp: 50 each, Rfl: 11    lisinopril (PRINIVIL,ZESTRIL) 40 MG tablet, TAKE ONE TABLET BY MOUTH ONE TIME DAILY, Disp: 30 tablet, Rfl: 11    MAGNESIUM ORAL, Take 1 tablet by mouth once daily., Disp: , Rfl:     modafinil (PROVIGIL) 200 MG Tab, 1 pill PO in AM and the second pill early afternoon PRN sleepiness., Disp: 60 tablet, Rfl: 5    multivitamin capsule, Take 1 capsule by mouth once daily., Disp: , Rfl:     oxybutynin (DITROPAN) 5 MG Tab, Take 1 tablet (5 mg total) by mouth every evening., Disp: 30 tablet, Rfl: 11    tadalafil (CIALIS) 5 MG tablet, Take 1 tablet (5 mg total) by mouth once daily., Disp: 30 tablet, Rfl: 11    taurine 1,000 mg Cap, Take by mouth once daily. , Disp: , Rfl:     testosterone cypionate (DEPOTESTOTERONE CYPIONATE) 200 mg/mL injection, Inject 1.5 mLs (300 mg total) into the muscle every 14 (fourteen) days., Disp: 10 mL, Rfl: 1    UBIDECARENONE (COENZYME Q10 ORAL), Take 1 tablet by mouth once daily., Disp: , Rfl:     VITAMIN K2 ORAL, Take 1 tablet by mouth once  "daily., Disp: , Rfl:     Allergies  Review of patient's allergies indicates:  No Known Allergies    EXAM  VITALS   Vitals:    09/15/17 1550   BP: (!) 145/76   Pulse: 62   Weight: 100.1 kg (220 lb 9.6 oz)   Height: 6' 1" (1.854 m)       RELEVANT LABS/STUDIES:    PSYCHIATRIC EXAMINATION  Appearance: well groomed, appearing healthy and of stated age    Behavior: cooperative, pleasant, no psychomotor agitation or retardation, fidgety  Speech: normal rate, rhythm, prosody, volume, talkative, interrupts frequently  Mood: good  Affect: normal range  Thought Process: linear, logical, goal directed, somewhat obsessive  Thought Content: negative for suicidal ideation, homicidal ideation, delusions or hallucinations.  Associations: intact  Memory: grossly intact  Level of Consciousness/Orientation: grossly intact  Fund of Knowledge: good  Attention: good.  Language: fluent, able to name abstract and concrete objects.  Insight: fair  Judgment: fair    Psychomotor signs: no involuntary movements or tremor  Gait: normal    Medical Decision Making    IMPRESSION   61 yo  male  reporting lifelong struggles with attention, now presenting with worsening attention over past several years. Pt reports hx of possible small stroke several years ago which may have led to worsening sx; however review of patient's chart show no clear evidence for significant cardivascular or neurological disease apart from mild hypertension and a ? Small cerebral cavernous malformation. Patient does meet critieria for ADHD with numerous sx of inattention, forgetfulness, distractibility and restlessness, which have been present since childhood. Pt does display significant anxiety about his health and obsessive compulsive personality traits, with extensive research and obsessive behavior towards health monitoring and treatment. However patient is not significantly anxious in affect and denies anxiety apart form health concerns. Therefore " Generalized Anxiety Disorder not clearly playing a role. No clear cognitive impairment, MOCA 28/30 on intake, with loss of one point on delayed recall and one point on attention for letter list response. Started guanfacine with mild benefit to hyperactivity, but focus problems unchanged. Had reevaluation by cardiology and PCP, including echo, no concern for bp or other cardiovascular issues that would contraindicate stimulant trial.  Had no response to low dose ritalin, returned after trial of adderall, reporting good benefit with no side effects. Patient returned recently reporting good control of ADHD sx but raised concern by concurrent use of modafinil and elevated bp, with general trend of increase since starting treatment.. Patient has subsequently followed up with cardiologist who denied any concern about mild aortic dilatation or hypertension after reviewing bp reads that patient has taken at home. Reports mostly good control of ADHD sx with adderal 5 mg bid, but requests mild dose increase. Finds modafinil helps better to maintain alertness throughout the day but does not help with attenion, takes only on weekend when he does not take adderall.    DIAGNOSES  Attention Deficit Disorder, combined type        PLAN  -increase adderall to 7.5 mg bid. Explained that studies suggest adderall use is safe in patient without preexisting cardiac disease but that long term safety is not yet established. Cautioned about risk for addiction. Patient feels that benefits outweigh risks and we he would like to continue.   -provided recommendation to avoid taking adderall and modafinil concurrently givne lap of sufficient safety data on the combination. Pt did bring in 2 small scale studies which combined both medications safely and states he only takes modafinil on weekends and on days when he does not take adderall.  -continue guanfacine XR 2 mg daily, will not increase since he does not take modafinil daily (modafinil can  lower guanfacine levels)  -continue to monitor bp. Patient has followed up with cardiologist at [facility], who denies any concern about patient taking stimulants.  -recommended that he set up appointment with psychologist Khurram Bush to review hx and discuss behavioral treatment of ADD.  -return for follow up in three months to review bp and plans for further treatment.     More than 50% of the time was spent on counseling and coordination of care.  Psychoeducation, coordination with other phsycians

## 2017-09-18 RX ORDER — GUANFACINE 2 MG/1
TABLET, EXTENDED RELEASE ORAL
Qty: 90 TABLET | Refills: 5 | OUTPATIENT
Start: 2017-09-18

## 2017-11-07 ENCOUNTER — TELEPHONE (OUTPATIENT)
Dept: INTERNAL MEDICINE | Facility: CLINIC | Age: 62
End: 2017-11-07

## 2017-11-07 DIAGNOSIS — R73.03 PREDIABETES: Primary | ICD-10-CM

## 2017-11-07 NOTE — TELEPHONE ENCOUNTER
----- Message from Omaira Renner sent at 11/7/2017 10:40 AM CST -----  Contact: self/[phone]  Pt called in regards to getting his 6 month labs put into the system. Pt appointment is on 11-8-17.      Please advise

## 2017-11-08 ENCOUNTER — LAB VISIT (OUTPATIENT)
Dept: LAB | Facility: HOSPITAL | Age: 62
End: 2017-11-08
Payer: COMMERCIAL

## 2017-11-08 DIAGNOSIS — R73.03 PREDIABETES: ICD-10-CM

## 2017-11-08 LAB
ALBUMIN SERPL BCP-MCNC: 3.4 G/DL
ALP SERPL-CCNC: 51 U/L
ALT SERPL W/O P-5'-P-CCNC: 24 U/L
ANION GAP SERPL CALC-SCNC: 5 MMOL/L
AST SERPL-CCNC: 27 U/L
BILIRUB SERPL-MCNC: 0.7 MG/DL
BUN SERPL-MCNC: 14 MG/DL
CALCIUM SERPL-MCNC: 8.8 MG/DL
CHLORIDE SERPL-SCNC: 104 MMOL/L
CO2 SERPL-SCNC: 30 MMOL/L
CREAT SERPL-MCNC: 1.2 MG/DL
EST. GFR  (AFRICAN AMERICAN): >60 ML/MIN/1.73 M^2
EST. GFR  (NON AFRICAN AMERICAN): >60 ML/MIN/1.73 M^2
ESTIMATED AVG GLUCOSE: 134 MG/DL
GLUCOSE SERPL-MCNC: 121 MG/DL
HBA1C MFR BLD HPLC: 6.3 %
POTASSIUM SERPL-SCNC: 4.3 MMOL/L
PROT SERPL-MCNC: 6.6 G/DL
SODIUM SERPL-SCNC: 139 MMOL/L

## 2017-11-08 PROCEDURE — 80053 COMPREHEN METABOLIC PANEL: CPT

## 2017-11-08 PROCEDURE — 83036 HEMOGLOBIN GLYCOSYLATED A1C: CPT

## 2017-11-08 PROCEDURE — 36415 COLL VENOUS BLD VENIPUNCTURE: CPT

## 2017-11-10 ENCOUNTER — TELEPHONE (OUTPATIENT)
Dept: INTERNAL MEDICINE | Facility: CLINIC | Age: 62
End: 2017-11-10

## 2017-11-10 DIAGNOSIS — E29.1 HYPOGONADISM MALE: ICD-10-CM

## 2017-11-10 DIAGNOSIS — R73.03 PREDIABETES: ICD-10-CM

## 2017-11-10 DIAGNOSIS — R97.20 ELEVATED PSA: ICD-10-CM

## 2017-11-10 DIAGNOSIS — N40.1 BENIGN PROSTATIC HYPERPLASIA WITH LOWER URINARY TRACT SYMPTOMS, SYMPTOM DETAILS UNSPECIFIED: ICD-10-CM

## 2017-11-10 DIAGNOSIS — I10 ESSENTIAL HYPERTENSION: ICD-10-CM

## 2017-11-10 DIAGNOSIS — E78.5 HYPERLIPIDEMIA, UNSPECIFIED HYPERLIPIDEMIA TYPE: ICD-10-CM

## 2017-11-10 DIAGNOSIS — Z00.00 WELL ADULT EXAM: Primary | ICD-10-CM

## 2017-11-10 NOTE — TELEPHONE ENCOUNTER
----- Message from Elva Nettles sent at 11/10/2017 12:44 PM CST -----  Contact: walk in  Doctor appointment and lab have been scheduled.  Please link lab orders to the lab appointment.  Date of doctor appointment:   1/26/18  Physical or EP:  Phys  Date of lab appointment:   1/19/18  Comments: Patient would like full labs drawn, PSA,  And Testerone as well

## 2017-11-14 RX ORDER — TESTOSTERONE CYPIONATE 200 MG/ML
300 INJECTION, SOLUTION INTRAMUSCULAR
Qty: 10 ML | Refills: 1 | Status: SHIPPED | OUTPATIENT
Start: 2017-11-14 | End: 2017-12-04 | Stop reason: SDUPTHER

## 2017-11-22 ENCOUNTER — TELEPHONE (OUTPATIENT)
Dept: UROLOGY | Facility: CLINIC | Age: 62
End: 2017-11-22

## 2017-11-22 NOTE — TELEPHONE ENCOUNTER
Spoke to Summer and informed her that we never received the prior- authorization    form from [facility] , will start the process do to the autho was only good for 3/2017-11/8/2017/usha. Summer will fax the form /ds

## 2017-11-22 NOTE — TELEPHONE ENCOUNTER
----- Message from Shawn Snow sent at 11/22/2017  1:48 PM CST -----  Contact: Summer ([facility])  X_ 1st Request  _ 2nd Request  _ 3rd Request    Who: Summer ([facility])    Why: Inquiring about the PA for testosterone cypionate (DEPOTESTOTERONE CYPIONATE) 200 mg/mL injection    What Number to Call Back: [phone]    When to Expect a call back: (Before the end of the day)  -- if call after 3:00 call back will be tomorrow.

## 2017-11-27 ENCOUNTER — TELEPHONE (OUTPATIENT)
Dept: UROLOGY | Facility: CLINIC | Age: 62
End: 2017-11-27

## 2017-11-27 NOTE — TELEPHONE ENCOUNTER
Spoke with Mellisa pharmacist in regarding pts PA. She stated that she will fax that request over.

## 2017-11-27 NOTE — TELEPHONE ENCOUNTER
----- Message from Chavez Hare sent at 11/27/2017 12:23 PM CST -----  Contact: Mellisa from [facility]   _X  1st Request  _  2nd Request  _  3rd Request        Who: Mellisa from [facility]     Why: Patient needs prior authorization for the testosterone cypionate (DEPOTESTOTERONE CYPIONATE) 200 mg/mL injection    What Number to Call Back: [phone]    When to Expect a call back: (Within 24 hours)    Please return the call at earliest convenience. Thanks!

## 2017-12-04 ENCOUNTER — PATIENT MESSAGE (OUTPATIENT)
Dept: UROLOGY | Facility: CLINIC | Age: 62
End: 2017-12-04

## 2017-12-04 RX ORDER — TESTOSTERONE CYPIONATE 200 MG/ML
300 INJECTION, SOLUTION INTRAMUSCULAR
Qty: 10 ML | Refills: 1 | Status: SHIPPED | OUTPATIENT
Start: 2017-12-04 | End: 2018-05-17 | Stop reason: SDUPTHER

## 2017-12-04 NOTE — TELEPHONE ENCOUNTER
----- Message from Ren Alcantara sent at 12/4/2017 11:57 AM CST -----  Contact: [facility]       _ x 1st Request  _  2nd Request  _  3rd Request    Please refill the medication(s) listed below. Please call the patient when the prescription(s) is ready for  at this phone number     [phone] (Phone)      Medication #1:testosterone cypionate (DEPOTESTOTERONE CYPIONATE) 200 mg/mL injection    #states sent over since 11/29/17, checking status       Preferred Pharmacy:[facility]/pharmacy #[id number] - [place], [place] - [address] [phone] (Phone)  [phone] (Fax)

## 2017-12-05 NOTE — TELEPHONE ENCOUNTER
Spoke to patient and informed him that the insurance company did not approve the injection and that the nurse evie stated that the insurance approved the injection last time .Informed patient that we were going to appeal  And when we hear from the insurance company we will give him a call/ds

## 2017-12-05 NOTE — TELEPHONE ENCOUNTER
Spoke to patient informed patient that the insurance approved medication,per jon at [facility] from 10/2017 to 12/2018/ds

## 2017-12-11 ENCOUNTER — TELEPHONE (OUTPATIENT)
Dept: INTERNAL MEDICINE | Facility: CLINIC | Age: 62
End: 2017-12-11

## 2017-12-11 RX ORDER — DOXYCYCLINE HYCLATE 100 MG
100 TABLET ORAL DAILY
Qty: 30 TABLET | Refills: 11 | Status: SHIPPED | OUTPATIENT
Start: 2017-12-11 | End: 2018-11-28 | Stop reason: SDUPTHER

## 2017-12-11 NOTE — TELEPHONE ENCOUNTER
"----- Message from Evita Hayward sent at 12/11/2017 11:59 AM CST -----  Contact: [facility]  [phone]  RX request - refill or new RX.  Is this a refill or new RX:  refill  RX name and strength: doxycycline (VIBRA-TABS) 100 MG tablet  Directions:   Is this a 30 day or 90 day RX:  90 day supply   Pharmacy name and phone # (DON'T enter "on file" or "in chart"): [facility] [phone]  Comments:          "

## 2017-12-12 RX ORDER — OXYBUTYNIN CHLORIDE 5 MG/1
5 TABLET ORAL NIGHTLY
Qty: 90 TABLET | Refills: 3 | Status: SHIPPED | OUTPATIENT
Start: 2017-12-12 | End: 2018-11-28 | Stop reason: SDUPTHER

## 2017-12-15 ENCOUNTER — OFFICE VISIT (OUTPATIENT)
Dept: DERMATOLOGY | Facility: CLINIC | Age: 62
End: 2017-12-15
Payer: COMMERCIAL

## 2017-12-15 VITALS — BODY MASS INDEX: 29.03 KG/M2 | WEIGHT: 220 LBS

## 2017-12-15 DIAGNOSIS — L81.4 LENTIGINES: ICD-10-CM

## 2017-12-15 DIAGNOSIS — D22.9 NEVUS OF MULTIPLE SITES: Primary | ICD-10-CM

## 2017-12-15 PROCEDURE — 88305 TISSUE EXAM BY PATHOLOGIST: CPT | Performed by: PATHOLOGY

## 2017-12-15 PROCEDURE — 11100 PR BIOPSY OF SKIN LESION: CPT | Mod: S$GLB,,, | Performed by: DERMATOLOGY

## 2017-12-15 PROCEDURE — 99214 OFFICE O/P EST MOD 30 MIN: CPT | Mod: 25,S$GLB,, | Performed by: DERMATOLOGY

## 2017-12-15 PROCEDURE — 99999 PR PBB SHADOW E&M-EST. PATIENT-LVL III: CPT | Mod: PBBFAC,,, | Performed by: DERMATOLOGY

## 2017-12-15 RX ORDER — METFORMIN HYDROCHLORIDE 500 MG/1
500 TABLET ORAL
Refills: 11 | COMMUNITY
Start: 2017-09-21 | End: 2017-12-21 | Stop reason: SDUPTHER

## 2017-12-15 RX ORDER — GUANFACINE 1 MG/1
TABLET, EXTENDED RELEASE ORAL
COMMUNITY
Start: 2017-12-10 | End: 2018-08-17 | Stop reason: SDUPTHER

## 2017-12-15 NOTE — PROGRESS NOTES
Subjective:       Patient ID:  Dominguez Zapata is a 62 y.o. male who presents for   Chief Complaint   Patient presents with    Skin Check     TBSE     History of Present Illness: The patient presents with chief complaint of moles.  Location: trunk and arms  Duration: years  Signs/Symptoms: darker after this summer , took melanin pills from a friend in [place]    Prior treatments: none          Review of Systems   Constitutional: Negative for fever.   Skin: Negative for itching and rash.   Hematologic/Lymphatic: Does not bruise/bleed easily.        Objective:    Physical Exam   Constitutional: He appears well-developed and well-nourished. No distress.   Neurological: He is alert and oriented to person, place, and time. He is not disoriented.   Psychiatric: He has a normal mood and affect.   Skin:   Areas Examined (abnormalities noted in diagram):   Scalp / Hair Palpated and Inspected  Head / Face Inspection Performed  Neck Inspection Performed  Chest / Axilla Inspection Performed  Abdomen Inspection Performed  Genitals / Buttocks / Groin Inspection Performed  Back Inspection Performed  RUE Inspected  LUE Inspection Performed  RLE Inspected  LLE Inspection Performed  Nails and Digits Inspection Performed              Diagram Legend     Erythematous scaling macule/papule c/w actinic keratosis       Vascular papule c/w angioma      Pigmented verrucoid papule/plaque c/w seborrheic keratosis      Yellow umbilicated papule c/w sebaceous hyperplasia      Irregularly shaped tan macule c/w lentigo     1-2 mm smooth white papules consistent with Milia      Movable subcutaneous cyst with punctum c/w epidermal inclusion cyst      Subcutaneous movable cyst c/w pilar cyst      Firm pink to brown papule c/w dermatofibroma      Pedunculated fleshy papule(s) c/w skin tag(s)      Evenly pigmented macule c/w junctional nevus     Mildly variegated pigmented, slightly irregular-bordered macule c/w mildly atypical nevus       "Flesh colored to evenly pigmented papule c/w intradermal nevus       Pink pearly papule/plaque c/w basal cell carcinoma      Erythematous hyperkeratotic cursted plaque c/w SCC      Surgical scar with no sign of skin cancer recurrence      Open and closed comedones      Inflammatory papules and pustules      Verrucoid papule consistent consistent with wart     Erythematous eczematous patches and plaques     Dystrophic onycholytic nail with subungual debris c/w onychomycosis     Umbilicated papule    Erythematous-base heme-crusted tan verrucoid plaque consistent with inflamed seborrheic keratosis     Erythematous Silvery Scaling Plaque c/w Psoriasis     See annotation      Assessment / Plan:      Pathology Orders:     Normal Orders This Visit    Tissue Specimen To Pathology, Dermatology     Questions:    Directional Terms:  Other(comment)    Clinical information:  nevus    Specific Site:  right lower back        Nevus of multiple sites  -     Tissue Specimen To Pathology, Dermatology  Shave removal procedure note:    Shave removal performed after verbal consent including risk of infection, scar, recurrence, need for additional treatment of site. Area prepped with alcohol, anesthetized 1% lidocaine with epinephrine. Lesional tissue shaved. Lesion defect size 6mm No complications. Dressing applied. Wound care explained.            Lentigines  The "ABCD" rules to observe pigmented lesions were reviewed.  sunscreen             Return in about 1 year (around 12/15/2018).  "

## 2017-12-21 RX ORDER — METFORMIN HYDROCHLORIDE 500 MG/1
TABLET ORAL
Qty: 240 TABLET | Refills: 1 | Status: SHIPPED | OUTPATIENT
Start: 2017-12-21 | End: 2018-11-28 | Stop reason: SDUPTHER

## 2017-12-22 ENCOUNTER — OFFICE VISIT (OUTPATIENT)
Dept: URGENT CARE | Facility: CLINIC | Age: 62
End: 2017-12-22
Payer: COMMERCIAL

## 2017-12-22 VITALS
SYSTOLIC BLOOD PRESSURE: 148 MMHG | HEIGHT: 74 IN | HEART RATE: 84 BPM | DIASTOLIC BLOOD PRESSURE: 89 MMHG | OXYGEN SATURATION: 95 % | RESPIRATION RATE: 16 BRPM | WEIGHT: 230 LBS | BODY MASS INDEX: 29.52 KG/M2 | TEMPERATURE: 98 F

## 2017-12-22 DIAGNOSIS — R05.9 COUGH: ICD-10-CM

## 2017-12-22 DIAGNOSIS — J20.9 ACUTE BRONCHITIS, UNSPECIFIED ORGANISM: Primary | ICD-10-CM

## 2017-12-22 PROCEDURE — 99214 OFFICE O/P EST MOD 30 MIN: CPT | Mod: 25,S$GLB,, | Performed by: NURSE PRACTITIONER

## 2017-12-22 PROCEDURE — 96372 THER/PROPH/DIAG INJ SC/IM: CPT | Mod: S$GLB,,, | Performed by: EMERGENCY MEDICINE

## 2017-12-22 RX ORDER — ALBUTEROL SULFATE 90 UG/1
2 AEROSOL, METERED RESPIRATORY (INHALATION) EVERY 6 HOURS PRN
Qty: 18 G | Refills: 0 | Status: SHIPPED | OUTPATIENT
Start: 2017-12-22 | End: 2018-03-09 | Stop reason: SDUPTHER

## 2017-12-22 RX ORDER — BENZONATATE 100 MG/1
100 CAPSULE ORAL EVERY 6 HOURS PRN
Qty: 30 CAPSULE | Refills: 0 | Status: SHIPPED | OUTPATIENT
Start: 2017-12-22 | End: 2018-08-17

## 2017-12-22 RX ORDER — BETAMETHASONE SODIUM PHOSPHATE AND BETAMETHASONE ACETATE 3; 3 MG/ML; MG/ML
6 INJECTION, SUSPENSION INTRA-ARTICULAR; INTRALESIONAL; INTRAMUSCULAR; SOFT TISSUE
Status: COMPLETED | OUTPATIENT
Start: 2017-12-22 | End: 2017-12-22

## 2017-12-22 RX ORDER — PREDNISONE 20 MG/1
20 TABLET ORAL DAILY
Qty: 5 TABLET | Refills: 0 | Status: SHIPPED | OUTPATIENT
Start: 2017-12-22 | End: 2017-12-27

## 2017-12-22 RX ADMIN — BETAMETHASONE SODIUM PHOSPHATE AND BETAMETHASONE ACETATE 6 MG: 3; 3 INJECTION, SUSPENSION INTRA-ARTICULAR; INTRALESIONAL; INTRAMUSCULAR; SOFT TISSUE at 03:12

## 2017-12-22 NOTE — PROGRESS NOTES
"Subjective:       Patient ID: Dominguez Zapata is a 62 y.o. male.    Vitals:  height is 6' 1.5" (1.867 m) and weight is 104.3 kg (230 lb). His temperature is 98.2 °F (36.8 °C). His blood pressure is 148/89 (abnormal) and his pulse is 84. His respiration is 16 and oxygen saturation is 95%.     Chief Complaint: Cough    Mr Zapaat comes to the clinic with complaint of cough for > 2 months.  OTC cough medication has been ineffective. He states it is interfering with his daily exercise. Cough started shortly [place]      Cough   This is a new problem. The current episode started in the past 7 days. The problem has been gradually worsening. The problem occurs constantly. The cough is productive of sputum. Associated symptoms include shortness of breath and wheezing. Pertinent negatives include no chest pain, chills, ear pain, eye redness, fever, headaches, myalgias or sore throat. The symptoms are aggravated by exercise. Treatments tried: doxy. The treatment provided no relief. His past medical history is significant for bronchitis.     Review of Systems   Constitution: Negative for chills, fever and malaise/fatigue.   HENT: Positive for congestion. Negative for ear pain, hoarse voice and sore throat.    Eyes: Negative for discharge and redness.   Cardiovascular: Negative for chest pain, dyspnea on exertion and leg swelling.   Respiratory: Positive for cough, shortness of breath, sputum production and wheezing.    Musculoskeletal: Negative for myalgias.   Gastrointestinal: Negative for abdominal pain and nausea.   Neurological: Negative for headaches.       Objective:      Physical Exam   Constitutional: He is oriented to person, place, and time. He appears well-developed and well-nourished. He is cooperative.  Non-toxic appearance. He does not appear ill. No distress.   HENT:   Head: Normocephalic and atraumatic.   Right Ear: Hearing, tympanic membrane, external ear and ear canal normal. "   Left Ear: Hearing, tympanic membrane, external ear and ear canal normal.   Nose: Rhinorrhea present. No mucosal edema or nasal deformity. No epistaxis. Right sinus exhibits no maxillary sinus tenderness and no frontal sinus tenderness. Left sinus exhibits no maxillary sinus tenderness and no frontal sinus tenderness.   Mouth/Throat: Uvula is midline and mucous membranes are normal. No trismus in the jaw. Normal dentition. No uvula swelling. Posterior oropharyngeal erythema present.       Eyes: Conjunctivae and lids are normal. No scleral icterus.   Sclera clear bilat   Neck: Trachea normal, full passive range of motion without pain and phonation normal. Neck supple.   Cardiovascular: Normal rate, regular rhythm, normal heart sounds, intact distal pulses and normal pulses.    Pulmonary/Chest: Effort normal and breath sounds normal. No respiratory distress. He has no decreased breath sounds. He has no wheezes. He has no rhonchi. He has no rales.   Abdominal: Soft. Normal appearance and bowel sounds are normal. He exhibits no distension. There is no tenderness.   Musculoskeletal: Normal range of motion. He exhibits no edema or deformity.   Neurological: He is alert and oriented to person, place, and time. He exhibits normal muscle tone. Coordination normal.   Skin: Skin is warm, dry and intact. He is not diaphoretic. No pallor.   Psychiatric: He has a normal mood and affect. His speech is normal and behavior is normal. Judgment and thought content normal. Cognition and memory are normal.   Nursing note and vitals reviewed.      Chest x-ray:  Impression      No acute process seen.       Assessment:       1. Acute bronchitis, unspecified organism    2. Cough        Plan:         Acute bronchitis, unspecified organism  -     betamethasone acetate-betamethasone sodium phosphate injection 6 mg; Inject 1 mL (6 mg total) into the muscle one time.  -     predniSONE (DELTASONE) 20 MG tablet; Take 1 tablet (20 mg total) by  mouth once daily. Start first does on 12/23/2017.  Dispense: 5 tablet; Refill: 0  -     benzonatate (TESSALON PERLES) 100 MG capsule; Take 1 capsule (100 mg total) by mouth every 6 (six) hours as needed.  Dispense: 30 capsule; Refill: 0  -     albuterol 90 mcg/actuation inhaler; Inhale 2 puffs into the lungs every 6 (six) hours as needed for Wheezing. Rescue  Dispense: 18 g; Refill: 0    Cough  -     X-Ray Chest PA And Lateral; Future; Expected date: 12/22/2017  -     betamethasone acetate-betamethasone sodium phosphate injection 6 mg; Inject 1 mL (6 mg total) into the muscle one time.  -     predniSONE (DELTASONE) 20 MG tablet; Take 1 tablet (20 mg total) by mouth once daily. Start first does on 12/23/2017.  Dispense: 5 tablet; Refill: 0  -     benzonatate (TESSALON PERLES) 100 MG capsule; Take 1 capsule (100 mg total) by mouth every 6 (six) hours as needed.  Dispense: 30 capsule; Refill: 0  -     albuterol 90 mcg/actuation inhaler; Inhale 2 puffs into the lungs every 6 (six) hours as needed for Wheezing. Rescue  Dispense: 18 g; Refill: 0      Patient Instructions       What Is Acute Bronchitis?  Acute bronchitis is when the airways in your lungs (bronchial tubes) become red and swollen (inflamed). It is usually caused by a viral infection. But it can also occur because of a bacteria or allergen. Symptoms include a cough that produces yellow or greenish mucus and can last for days or sometimes weeks.  Inside healthy lungs    Air travels in and out of the lungs through the airways. The linings of these airways produce sticky mucus. This mucus traps particles that enter the lungs. Tiny structures called cilia then sweep the particles out of the airways.     Healthy airway: Airways are normally open. Air moves in and out easily.      Healthy cilia: Tiny, hairlike cilia sweep mucus and particles up and out of the airways.   Lungs with bronchitis  Bronchitis often occurs with a cold or the flu virus. The airways become  inflamed (red and swollen). There is a deep hacking cough from the extra mucus. Other symptoms may include:  · Wheezing  · Chest discomfort  · Shortness of breath  · Mild fever  A second infection, this time due to bacteria, may then occur. And airways irritated by allergens or smoke are more likely to get infected.        Inflamed airway: Inflammation and extra mucus narrow the airway, causing shortness of breath.      Impaired cilia: Extra mucus impairs cilia, causing congestion and wheezing. Smoking makes the problem worse.   Making a diagnosis  A physical exam, health history, and certain tests help your healthcare provider make the diagnosis.  Health history  Your healthcare provider will ask you about your symptoms.  The exam  Your provider listens to your chest for signs of congestion. He or she may also check your ears, nose, and throat.  Possible tests  · A sputum test for bacteria. This requires a sample of mucus from your lungs.  · A nasal or throat swab. This tests to see if you have a bacterial infection.  · A chest X-ray. This is done if your healthcare provider thinks you have pneumonia.  · Tests to check for an underlying condition. Other tests may be done to check for things such as allergies, asthma, or COPD (chronic obstructive pulmonary disease). You may need to see a specialist for more lung function testing.  Treating a cough  The main treatment for bronchitis is easing symptoms. Avoiding smoke, allergens, and other things that trigger coughing can often help. If the infection is bacterial, you may be given antibiotics. During the illness, it's important to get plenty of sleep. To ease symptoms:  · Dont smoke. Also avoid secondhand smoke.  · Use a humidifier. Or try breathing in steam from a hot shower. This may help loosen mucus.  · Drink a lot of water and juice. They can soothe the throat and may help thin mucus.  · Sit up or use extra pillows when in bed. This helps to lessen coughing and  congestion.  · Ask your provider about using medicine. Ask about using cough medicine, pain and fever medicine, or a decongestant.  Antibiotics  Most cases of bronchitis are caused by cold or flu viruses. They dont need antibiotics to treat them, even if your mucus is thick and green or yellow. Antibiotics dont treat viral illness and antibiotics have not been shown to have any benefit in cases of acute bronchitis. Taking antibiotics when they are not needed increases your risk of getting an infection later that is antibiotic-resistant. Antibiotics can also cause severe cases of diarrhea that require other antibiotics to treat.  It is important that you accept your healthcare provider's opinion to not use antibiotics. Your provider will prescribe antibiotics if the infection is caused by bacteria. If they are prescribed:  · Take all of the medicine. Take the medicine until it is used up, even if symptoms have improved. If you dont, the bronchitis may come back.  · Take the medicines as directed. For instance, some medicines should be taken with food.  · Ask about side effects. Ask your provider or pharmacist what side effects are common, and what to do about them.  Follow-up care  You should see your provider again in 2 to 3 weeks. By this time, symptoms should have improved. An infection that lasts longer may mean you have a more serious problem.  Prevention  · Avoid tobacco smoke. If you smoke, quit. Stay away from smoky places. Ask friends and family not to smoke around you, or in your home or car.  · Get checked for allergies.  · Ask your provider about getting a yearly flu shot. Also ask about pneumococcal or pneumonia shots.  · Wash your hands often. This helps reduce the chance of picking up viruses that cause colds and flu.  Call your healthcare provider if:  · Symptoms worsen, or you have new symptoms  · Breathing problems worsen or  become severe  · Symptoms dont get better within a week, or within  3 days of taking antibiotics   Date Last Reviewed: 2/1/2017  © 4436-1099 [employer]. [address] [place] [address], [place] [address]. All rights reserved. This information is not intended as a substitute for professional medical care. Always follow your healthcare professional's instructions.

## 2017-12-22 NOTE — PATIENT INSTRUCTIONS
What Is Acute Bronchitis?  Acute bronchitis is when the airways in your lungs (bronchial tubes) become red and swollen (inflamed). It is usually caused by a viral infection. But it can also occur because of a bacteria or allergen. Symptoms include a cough that produces yellow or greenish mucus and can last for days or sometimes weeks.  Inside healthy lungs    Air travels in and out of the lungs through the airways. The linings of these airways produce sticky mucus. This mucus traps particles that enter the lungs. Tiny structures called cilia then sweep the particles out of the airways.     Healthy airway: Airways are normally open. Air moves in and out easily.      Healthy cilia: Tiny, hairlike cilia sweep mucus and particles up and out of the airways.   Lungs with bronchitis  Bronchitis often occurs with a cold or the flu virus. The airways become inflamed (red and swollen). There is a deep hacking cough from the extra mucus. Other symptoms may include:  · Wheezing  · Chest discomfort  · Shortness of breath  · Mild fever  A second infection, this time due to bacteria, may then occur. And airways irritated by allergens or smoke are more likely to get infected.        Inflamed airway: Inflammation and extra mucus narrow the airway, causing shortness of breath.      Impaired cilia: Extra mucus impairs cilia, causing congestion and wheezing. Smoking makes the problem worse.   Making a diagnosis  A physical exam, health history, and certain tests help your healthcare provider make the diagnosis.  Health history  Your healthcare provider will ask you about your symptoms.  The exam  Your provider listens to your chest for signs of congestion. He or she may also check your ears, nose, and throat.  Possible tests  · A sputum test for bacteria. This requires a sample of mucus from your lungs.  · A nasal or throat swab. This tests to see if you have a bacterial infection.  · A chest X-ray. This is done if your healthcare  provider thinks you have pneumonia.  · Tests to check for an underlying condition. Other tests may be done to check for things such as allergies, asthma, or COPD (chronic obstructive pulmonary disease). You may need to see a specialist for more lung function testing.  Treating a cough  The main treatment for bronchitis is easing symptoms. Avoiding smoke, allergens, and other things that trigger coughing can often help. If the infection is bacterial, you may be given antibiotics. During the illness, it's important to get plenty of sleep. To ease symptoms:  · Dont smoke. Also avoid secondhand smoke.  · Use a humidifier. Or try breathing in steam from a hot shower. This may help loosen mucus.  · Drink a lot of water and juice. They can soothe the throat and may help thin mucus.  · Sit up or use extra pillows when in bed. This helps to lessen coughing and congestion.  · Ask your provider about using medicine. Ask about using cough medicine, pain and fever medicine, or a decongestant.  Antibiotics  Most cases of bronchitis are caused by cold or flu viruses. They dont need antibiotics to treat them, even if your mucus is thick and green or yellow. Antibiotics dont treat viral illness and antibiotics have not been shown to have any benefit in cases of acute bronchitis. Taking antibiotics when they are not needed increases your risk of getting an infection later that is antibiotic-resistant. Antibiotics can also cause severe cases of diarrhea that require other antibiotics to treat.  It is important that you accept your healthcare provider's opinion to not use antibiotics. Your provider will prescribe antibiotics if the infection is caused by bacteria. If they are prescribed:  · Take all of the medicine. Take the medicine until it is used up, even if symptoms have improved. If you dont, the bronchitis may come back.  · Take the medicines as directed. For instance, some medicines should be taken with food.  · Ask about  side effects. Ask your provider or pharmacist what side effects are common, and what to do about them.  Follow-up care  You should see your provider again in 2 to 3 weeks. By this time, symptoms should have improved. An infection that lasts longer may mean you have a more serious problem.  Prevention  · Avoid tobacco smoke. If you smoke, quit. Stay away from smoky places. Ask friends and family not to smoke around you, or in your home or car.  · Get checked for allergies.  · Ask your provider about getting a yearly flu shot. Also ask about pneumococcal or pneumonia shots.  · Wash your hands often. This helps reduce the chance of picking up viruses that cause colds and flu.  Call your healthcare provider if:  · Symptoms worsen, or you have new symptoms  · Breathing problems worsen or  become severe  · Symptoms dont get better within a week, or within 3 days of taking antibiotics   Date Last Reviewed: 2/1/2017  © 0992-6373 [employer]. [address] [place] [address], [place] [address]. All rights reserved. This information is not intended as a substitute for professional medical care. Always follow your healthcare professional's instructions.

## 2018-01-19 ENCOUNTER — LAB VISIT (OUTPATIENT)
Dept: LAB | Facility: HOSPITAL | Age: 63
End: 2018-01-19
Attending: FAMILY MEDICINE
Payer: COMMERCIAL

## 2018-01-19 DIAGNOSIS — R97.20 ELEVATED PSA: ICD-10-CM

## 2018-01-19 DIAGNOSIS — Z00.00 WELL ADULT EXAM: ICD-10-CM

## 2018-01-19 DIAGNOSIS — N40.1 BENIGN PROSTATIC HYPERPLASIA WITH LOWER URINARY TRACT SYMPTOMS, SYMPTOM DETAILS UNSPECIFIED: ICD-10-CM

## 2018-01-19 DIAGNOSIS — E29.1 HYPOGONADISM MALE: ICD-10-CM

## 2018-01-19 DIAGNOSIS — E78.5 HYPERLIPIDEMIA, UNSPECIFIED HYPERLIPIDEMIA TYPE: ICD-10-CM

## 2018-01-19 DIAGNOSIS — R73.03 PREDIABETES: ICD-10-CM

## 2018-01-19 LAB
25(OH)D3+25(OH)D2 SERPL-MCNC: 53 NG/ML
ALBUMIN SERPL BCP-MCNC: 3.8 G/DL
ALP SERPL-CCNC: 55 U/L
ALT SERPL W/O P-5'-P-CCNC: 26 U/L
ANION GAP SERPL CALC-SCNC: 8 MMOL/L
AST SERPL-CCNC: 33 U/L
BASOPHILS # BLD AUTO: 0.05 K/UL
BASOPHILS NFR BLD: 0.6 %
BILIRUB SERPL-MCNC: 1.5 MG/DL
BUN SERPL-MCNC: 15 MG/DL
CALCIUM SERPL-MCNC: 9.4 MG/DL
CHLORIDE SERPL-SCNC: 100 MMOL/L
CHOLEST SERPL-MCNC: 226 MG/DL
CHOLEST/HDLC SERPL: 5.3 {RATIO}
CO2 SERPL-SCNC: 30 MMOL/L
COMPLEXED PSA SERPL-MCNC: 15.4 NG/ML
CREAT SERPL-MCNC: 1.3 MG/DL
DIFFERENTIAL METHOD: ABNORMAL
EOSINOPHIL # BLD AUTO: 0.2 K/UL
EOSINOPHIL NFR BLD: 2.2 %
ERYTHROCYTE [DISTWIDTH] IN BLOOD BY AUTOMATED COUNT: 15.4 %
EST. GFR  (AFRICAN AMERICAN): >60 ML/MIN/1.73 M^2
EST. GFR  (NON AFRICAN AMERICAN): 58.1 ML/MIN/1.73 M^2
ESTIMATED AVG GLUCOSE: 137 MG/DL
GLUCOSE SERPL-MCNC: 101 MG/DL
HBA1C MFR BLD HPLC: 6.4 %
HCT VFR BLD AUTO: 52.2 %
HDLC SERPL-MCNC: 43 MG/DL
HDLC SERPL: 19 %
HGB BLD-MCNC: 17.2 G/DL
IMM GRANULOCYTES # BLD AUTO: 0.03 K/UL
IMM GRANULOCYTES NFR BLD AUTO: 0.4 %
LDLC SERPL CALC-MCNC: 162.6 MG/DL
LYMPHOCYTES # BLD AUTO: 1.7 K/UL
LYMPHOCYTES NFR BLD: 20.6 %
MCH RBC QN AUTO: 29 PG
MCHC RBC AUTO-ENTMCNC: 33 G/DL
MCV RBC AUTO: 88 FL
MONOCYTES # BLD AUTO: 0.8 K/UL
MONOCYTES NFR BLD: 9.4 %
NEUTROPHILS # BLD AUTO: 5.4 K/UL
NEUTROPHILS NFR BLD: 66.8 %
NONHDLC SERPL-MCNC: 183 MG/DL
NRBC BLD-RTO: 0 /100 WBC
PLATELET # BLD AUTO: 274 K/UL
PMV BLD AUTO: 11.4 FL
POTASSIUM SERPL-SCNC: 4.3 MMOL/L
PROT SERPL-MCNC: 7.1 G/DL
RBC # BLD AUTO: 5.93 M/UL
SODIUM SERPL-SCNC: 138 MMOL/L
T4 FREE SERPL-MCNC: 1.03 NG/DL
TESTOST SERPL-MCNC: 1099 NG/DL
TRIGL SERPL-MCNC: 102 MG/DL
TSH SERPL DL<=0.005 MIU/L-ACNC: 0.22 UIU/ML
WBC # BLD AUTO: 8.11 K/UL

## 2018-01-19 PROCEDURE — 83036 HEMOGLOBIN GLYCOSYLATED A1C: CPT

## 2018-01-19 PROCEDURE — 80053 COMPREHEN METABOLIC PANEL: CPT

## 2018-01-19 PROCEDURE — 84439 ASSAY OF FREE THYROXINE: CPT

## 2018-01-19 PROCEDURE — 84443 ASSAY THYROID STIM HORMONE: CPT

## 2018-01-19 PROCEDURE — 82306 VITAMIN D 25 HYDROXY: CPT

## 2018-01-19 PROCEDURE — 80061 LIPID PANEL: CPT

## 2018-01-19 PROCEDURE — 85025 COMPLETE CBC W/AUTO DIFF WBC: CPT

## 2018-01-19 PROCEDURE — 36415 COLL VENOUS BLD VENIPUNCTURE: CPT | Mod: PO

## 2018-01-19 PROCEDURE — 84403 ASSAY OF TOTAL TESTOSTERONE: CPT

## 2018-01-19 PROCEDURE — 84153 ASSAY OF PSA TOTAL: CPT

## 2018-01-24 ENCOUNTER — TELEPHONE (OUTPATIENT)
Dept: CARDIOLOGY | Facility: CLINIC | Age: 63
End: 2018-01-24

## 2018-01-24 NOTE — TELEPHONE ENCOUNTER
----- Message from Julianna Martell sent at 1/24/2018  4:04 PM CST -----  Contact: patient  Please call pt at [phone]. Patient is having some BP issues. Last seen Dr Velasquez 09/13/17  Appt scheduled 01/25/18 with Viktoria Avendano    Thank you

## 2018-01-24 NOTE — TELEPHONE ENCOUNTER
Spoke with the pt - says that his BP has been elevated - today at 4:19 pm was 169/116. Says that recently he has been having panic attacks - [says has not talked with his PCP about the panic attacks], HR up, and feels out of breath when he runs.  Reviewed pt's cardiac meds - says is taking lisinopril 40 mg one daily and guanfacine 1 mg in the am and 2 mg in the pm. Pt has an appt tomorrow with the NP at 8 am. Spoke with Dr. Velasquez - says ok for the pt to take one half of lisinopril now for elevated BP. Instructions given to the pt and he verbalized understanding.

## 2018-01-24 NOTE — PROGRESS NOTES
Mr. Zapata is a patient of Dr. Velasquez and was last seen in [facility] 9/13/2017.      Subjective:   Patient ID:  Dominguez Zapata is a 62 y.o. male who presents for follow-up of Hypertension and Shortness of Breath  .   HPI:    Mr. Zapata is a 61yo male with HTN, HLD, history of stroke (cavernous malformation) here for follow up.  He says that he is experiencing SOB while running his usual distance which has been worsening since November. He went to urgent care and his CXR was clear. He also says that he has noticed his HR increasing faster with activity. He brought in a BP log and his blood pressures have been elevated over the past 1-2 weeks, averaging 160s-170s/. He says that he is frequently nauseated which is concerning for him since he was also nauseated during his stroke in 2013. Mr. Zapata denies chest pain with exertion or at rest, palpitations, syncope, leg edema, claudication, PND, or orthopnea.    He is taking lisinopril 40mg and guanfacine 1mg in the am and 2mg at night (taken for HTN and ADHD). Creatinine is 1.3. K is 4.3. Hepatic transaminases are within normal limits. HA1C is 6.4. .6 on 1/19/2018. ASCVD risk 34.1%. CACS from 7/24/2015 was 35.3.    Recent Cardiac Tests:    2D Echo (7/8/2016):  CONCLUSIONS     1 - Mildly enlarged ascending aorta.     2 - Moderate left atrial enlargement.     3 - Normal left ventricular systolic function (EF 60-65%).     4 - Normal left ventricular diastolic function.     5 - Normal right ventricular systolic function .     6 - Mild aortic regurgitation.     7 - Mild to moderate mitral regurgitation.     8 - The estimated PA systolic pressure is 28 mmHg.     Current Outpatient Prescriptions   Medication Sig    benzonatate (TESSALON PERLES) 100 MG capsule Take 1 capsule (100 mg total) by mouth every 6 (six) hours as needed.    CHOLECALCIFEROL, VITAMIN D3, (VITAMIN D3 ORAL) Take 600 mg by mouth once daily.    DOCOSAHEXANOIC ACID/EPA  (FISH OIL ORAL) Take 1 capsule by mouth once daily.    doxycycline (VIBRA-TABS) 100 MG tablet Take 1 tablet (100 mg total) by mouth once daily.    GLUC GONZALEZ/CHONDRO GONZALEZ A/VIT C/MN (GLUCOSAMINE CHONDROITIN MAXSTR ORAL) Take 1 tablet by mouth 2 (two) times daily.    guanFACINE 1 mg Tb24     guanfacine 2 mg Tb24 Take 1 tablet by mouth once daily.    lancets (ONE TOUCH DELICA LANCETS) 33 gauge Misc 1 lancet by Misc.(Non-Drug; Combo Route) route Daily.    lisinopril (PRINIVIL,ZESTRIL) 40 MG tablet TAKE ONE TABLET BY MOUTH ONE TIME DAILY    MAGNESIUM ORAL Take 1 tablet by mouth once daily.    modafinil (PROVIGIL) 200 MG Tab 1 pill PO in AM and the second pill early afternoon PRN sleepiness.    multivitamin capsule Take 1 capsule by mouth once daily.    oxybutynin (DITROPAN) 5 MG Tab Take 1 tablet (5 mg total) by mouth every evening.    tadalafil (CIALIS) 5 MG tablet Take 1 tablet (5 mg total) by mouth once daily.    taurine 1,000 mg Cap Take by mouth once daily.     testosterone cypionate (DEPOTESTOTERONE CYPIONATE) 200 mg/mL injection Inject 1.5 mLs (300 mg total) into the muscle every 14 (fourteen) days.    UBIDECARENONE (COENZYME Q10 ORAL) Take 1 tablet by mouth once daily.    VITAMIN K2 ORAL Take 1 tablet by mouth once daily.    albuterol 90 mcg/actuation inhaler Inhale 2 puffs into the lungs every 6 (six) hours as needed for Wheezing. Rescue    dextroamphetamine-amphetamine (ADDERALL) 5 mg Tab Take 7.5 mg by mouth 2 (two) times daily.    metFORMIN (GLUCOPHAGE) 500 MG tablet TAKE ONE TABLET BY MOUTH DAILY WITH BREAKFAST     No current facility-administered medications for this visit.      Review of Systems   Constitution: Negative for malaise/fatigue.   Eyes: Negative for blurred vision.   Cardiovascular: Positive for dyspnea on exertion. Negative for chest pain, claudication, irregular heartbeat, leg swelling, orthopnea, palpitations, paroxysmal nocturnal dyspnea and syncope.   Respiratory: Negative  "for shortness of breath.    Hematologic/Lymphatic: Negative for bleeding problem.   Skin: Negative for rash.   Musculoskeletal: Negative for myalgias.   Gastrointestinal: Positive for nausea. Negative for abdominal pain, constipation and diarrhea.   Genitourinary: Negative for hematuria.   Neurological: Positive for headaches. Negative for loss of balance and numbness.   Psychiatric/Behavioral: Negative for altered mental status. The patient is nervous/anxious.    Allergic/Immunologic: Negative for persistent infections.     Objective:     Right Arm BP - Sittin/90 (18 0752)  Left Arm BP - Sittin/93 (18 0752)    BP (!) 154/93   Pulse 64   Ht 6' 1" (1.854 m)   Wt 102 kg (224 lb 13.9 oz)   BMI 29.67 kg/m²     Physical Exam   Constitutional: He is oriented to person, place, and time. He appears well-developed and well-nourished.   HENT:   Head: Normocephalic.   Nose: Nose normal.   Eyes: Pupils are equal, round, and reactive to light.   Neck: No JVD present. Carotid bruit is not present.   Cardiovascular: Normal rate, regular rhythm, S1 normal and S2 normal.  Exam reveals no gallop.    No murmur heard.  Pulses:       Carotid pulses are 2+ on the right side, and 2+ on the left side.       Radial pulses are 2+ on the right side, and 2+ on the left side.        Dorsalis pedis pulses are 2+ on the right side, and 2+ on the left side.   Pulmonary/Chest: Breath sounds normal. No respiratory distress.   Abdominal: Soft. Bowel sounds are normal. He exhibits no distension. There is no tenderness.   Musculoskeletal: Normal range of motion. He exhibits no edema.   Neurological: He is alert and oriented to person, place, and time.   Skin: Skin is warm and dry. No erythema.   Psychiatric: He has a normal mood and affect. His speech is normal and behavior is normal.   Nursing note and vitals reviewed.    Lab Results   Component Value Date     2018    K 4.3 2018     2018    CO2 " 30 (H) 01/19/2018    BUN 15 01/19/2018    CREATININE 1.3 01/19/2018     01/19/2018    HGBA1C 6.4 (H) 01/19/2018    AST 33 01/19/2018    ALT 26 01/19/2018    ALBUMIN 3.8 01/19/2018    ALBUMIN 4.1 08/08/2017    PROT 7.1 01/19/2018    BILITOT 1.5 (H) 01/19/2018    WBC 8.11 01/19/2018    HGB 17.2 01/19/2018    HCT 52.2 01/19/2018    MCV 88 01/19/2018     01/19/2018    TSH 0.220 (L) 01/19/2018    CHOL 226 (H) 01/19/2018    HDL 43 01/19/2018    LDLCALC 162.6 (H) 01/19/2018    TRIG 102 01/19/2018         Recent Labs  Lab 05/20/15  1447   INR 1.0        Test(s) Reviewed  I have reviewed the following in detail:  [] Stress test   [] Angiography   [] Echocardiogram   [x] Labs   [] Other:         Assessment:         1. Essential hypertension. Patient has elevated BP. Will add amlodipine 5mg to regimen.      2. ALEXANDER (dyspnea on exertion). Patient experiencing increased ALEXANDER. Will order TWYLA.     3. Ascending aorta dilatation. Mild per echo.     4. Hyperlipidemia, unspecified hyperlipidemia type.  with CACS of 35. Patient willing to start rosuvastatin.     5. Prediabetes. HA1C 6.4. Patient says he is going to start reducing his sugar.     6. Coronary artery disease due to calcified coronary lesion. Per CACS. Will add statin to regimen.     Plan:     Dominguez was seen today for hypertension and shortness of breath.    Diagnoses and all orders for this visit:    Essential hypertension  -     amLODIPine (NORVASC) 5 MG tablet; Take 1 tablet (5 mg total) by mouth once daily.  -     Comprehensive metabolic panel; Future; Expected date: 03/08/2018    ALEXANDER (dyspnea on exertion)  -     Exercise stress echo with color flow; Future    Ascending aorta dilatation  -     Exercise stress echo with color flow; Future    Hyperlipidemia, unspecified hyperlipidemia type  -     Lipid panel; Future; Expected date: 03/08/2018  -     Comprehensive metabolic panel; Future; Expected date: 03/08/2018    Prediabetes  -     rosuvastatin  (CRESTOR) 20 MG tablet; Take 1 tablet (20 mg total) by mouth once daily.  -     Lipid panel; Future; Expected date: 03/08/2018    Coronary artery disease due to calcified coronary lesion      Schedule exercise stress echo test.  Start rosuvastatin 20mg daily.   Start amlodipine 5mg daily.  Continue other medications.  Fasting blood test in 6 weeks.    Follow-up in about 6 months (around 7/25/2018).    ------------------------------------------------------------------    SLAVA Cerrato, NP-C  Consult Cardiology

## 2018-01-25 ENCOUNTER — OFFICE VISIT (OUTPATIENT)
Dept: CARDIOLOGY | Facility: CLINIC | Age: 63
End: 2018-01-25
Payer: COMMERCIAL

## 2018-01-25 VITALS
DIASTOLIC BLOOD PRESSURE: 93 MMHG | HEIGHT: 73 IN | HEART RATE: 64 BPM | BODY MASS INDEX: 29.8 KG/M2 | SYSTOLIC BLOOD PRESSURE: 154 MMHG | WEIGHT: 224.88 LBS

## 2018-01-25 DIAGNOSIS — I25.10 CORONARY ARTERY DISEASE DUE TO CALCIFIED CORONARY LESION: ICD-10-CM

## 2018-01-25 DIAGNOSIS — I25.84 CORONARY ARTERY DISEASE DUE TO CALCIFIED CORONARY LESION: ICD-10-CM

## 2018-01-25 DIAGNOSIS — E78.5 HYPERLIPIDEMIA, UNSPECIFIED HYPERLIPIDEMIA TYPE: ICD-10-CM

## 2018-01-25 DIAGNOSIS — I77.810 ASCENDING AORTA DILATATION: Chronic | ICD-10-CM

## 2018-01-25 DIAGNOSIS — R06.09 DOE (DYSPNEA ON EXERTION): ICD-10-CM

## 2018-01-25 DIAGNOSIS — R73.03 PREDIABETES: ICD-10-CM

## 2018-01-25 DIAGNOSIS — I10 ESSENTIAL HYPERTENSION: Primary | ICD-10-CM

## 2018-01-25 PROCEDURE — 99214 OFFICE O/P EST MOD 30 MIN: CPT | Mod: S$GLB,,, | Performed by: NURSE PRACTITIONER

## 2018-01-25 PROCEDURE — 99999 PR PBB SHADOW E&M-EST. PATIENT-LVL III: CPT | Mod: PBBFAC,,, | Performed by: NURSE PRACTITIONER

## 2018-01-25 RX ORDER — ROSUVASTATIN CALCIUM 20 MG/1
20 TABLET, COATED ORAL DAILY
Qty: 30 TABLET | Refills: 11 | Status: SHIPPED | OUTPATIENT
Start: 2018-01-25 | End: 2019-01-14

## 2018-01-25 RX ORDER — AMLODIPINE BESYLATE 5 MG/1
5 TABLET ORAL DAILY
Qty: 30 TABLET | Refills: 11 | Status: SHIPPED | OUTPATIENT
Start: 2018-01-25 | End: 2018-08-17

## 2018-01-25 NOTE — PATIENT INSTRUCTIONS
Schedule exercise stress echo test.  Start rosuvastatin 20mg daily.   Start amlodipine 5mg daily.  Continue other medications.  Fasting blood test in 6 weeks.

## 2018-01-26 ENCOUNTER — OFFICE VISIT (OUTPATIENT)
Dept: INTERNAL MEDICINE | Facility: CLINIC | Age: 63
End: 2018-01-26
Payer: COMMERCIAL

## 2018-01-26 VITALS
HEART RATE: 69 BPM | SYSTOLIC BLOOD PRESSURE: 140 MMHG | DIASTOLIC BLOOD PRESSURE: 80 MMHG | HEIGHT: 73 IN | BODY MASS INDEX: 30.15 KG/M2 | TEMPERATURE: 98 F | WEIGHT: 227.5 LBS | OXYGEN SATURATION: 95 % | RESPIRATION RATE: 18 BRPM

## 2018-01-26 DIAGNOSIS — E29.1 HYPOGONADISM MALE: ICD-10-CM

## 2018-01-26 DIAGNOSIS — K21.9 GASTROESOPHAGEAL REFLUX DISEASE, ESOPHAGITIS PRESENCE NOT SPECIFIED: ICD-10-CM

## 2018-01-26 DIAGNOSIS — I10 ESSENTIAL HYPERTENSION: ICD-10-CM

## 2018-01-26 DIAGNOSIS — I77.810 ASCENDING AORTA DILATATION: Chronic | ICD-10-CM

## 2018-01-26 DIAGNOSIS — E78.5 HYPERLIPIDEMIA, UNSPECIFIED HYPERLIPIDEMIA TYPE: ICD-10-CM

## 2018-01-26 DIAGNOSIS — Z86.010 HISTORY OF COLONIC POLYPS: ICD-10-CM

## 2018-01-26 DIAGNOSIS — Q28.3 CAVERNOUS MALFORMATION: ICD-10-CM

## 2018-01-26 DIAGNOSIS — E66.9 OBESITY, UNSPECIFIED CLASSIFICATION, UNSPECIFIED OBESITY TYPE, UNSPECIFIED WHETHER SERIOUS COMORBIDITY PRESENT: ICD-10-CM

## 2018-01-26 DIAGNOSIS — Z00.00 WELL ADULT EXAM: Primary | ICD-10-CM

## 2018-01-26 DIAGNOSIS — I25.84 CORONARY ARTERY DISEASE DUE TO CALCIFIED CORONARY LESION: ICD-10-CM

## 2018-01-26 DIAGNOSIS — Z12.11 COLON CANCER SCREENING: ICD-10-CM

## 2018-01-26 DIAGNOSIS — N40.1 BENIGN PROSTATIC HYPERPLASIA WITH LOWER URINARY TRACT SYMPTOMS, SYMPTOM DETAILS UNSPECIFIED: ICD-10-CM

## 2018-01-26 DIAGNOSIS — R97.20 ELEVATED PSA: ICD-10-CM

## 2018-01-26 DIAGNOSIS — R73.03 PREDIABETES: ICD-10-CM

## 2018-01-26 DIAGNOSIS — F90.2 ATTENTION DEFICIT HYPERACTIVITY DISORDER (ADHD), COMBINED TYPE: ICD-10-CM

## 2018-01-26 DIAGNOSIS — I25.10 CORONARY ARTERY DISEASE DUE TO CALCIFIED CORONARY LESION: ICD-10-CM

## 2018-01-26 PROCEDURE — 99396 PREV VISIT EST AGE 40-64: CPT | Mod: S$GLB,,, | Performed by: FAMILY MEDICINE

## 2018-01-26 PROCEDURE — 99999 PR PBB SHADOW E&M-EST. PATIENT-LVL III: CPT | Mod: PBBFAC,,, | Performed by: FAMILY MEDICINE

## 2018-01-26 RX ORDER — LISINOPRIL 40 MG/1
40 TABLET ORAL DAILY
Qty: 30 TABLET | Refills: 11 | Status: SHIPPED | OUTPATIENT
Start: 2018-01-26 | End: 2019-02-07 | Stop reason: SDUPTHER

## 2018-01-26 RX ORDER — PANTOPRAZOLE SODIUM 40 MG/1
40 TABLET, DELAYED RELEASE ORAL DAILY
Qty: 30 TABLET | Refills: 11 | Status: SHIPPED | OUTPATIENT
Start: 2018-01-26 | End: 2018-08-28 | Stop reason: CLARIF

## 2018-01-26 NOTE — PROGRESS NOTES
Subjective:       Patient ID: Dominguez Zapata is a 62 y.o. male.    Chief Complaint: Annual Exam    HPI 62-year-old white male presents to clinic today for annual physical exam.  He continues to be followed by urology secondary to BPH and hypogonadism.  He has recently been restarted on testosterone but also recently PSA level has substantially elevated.  He is scheduled for urology follow-up in 2 weeks for further evaluation.  He denies any urinary difficulty or weak urinary stream.  He has been followed by neurology secondary to ocular migraines and cavernous malformation each remain stable.  He has been treated for prediabetes in the past and current hemoglobin A1c is 6.3 off medication.  He continues to be followed by psychiatry for treatment of ADHD who reports that he is recently stopped all medications secondary to concerns of shortness of breath with exertion and elevated heart rate.  He has recently seen cardiology for further evaluation.  Stress test has been scheduled and the patient has also been started on amlodipine for further blood pressure control.  He is also noted to have substantial hyperlipidemia for which he was started on Crestor.  He reports a past surgical history of prostate biopsy and hernia repair.  He has a family history of his mother passing away from renal failure at the age of 70 and his father passing away from diabetes at the age of 83.  He is due for colonoscopy at this time.  He is up-to-date with all vaccinations.  Finally, he also complains of increased acid reflux over the past month.  Review of Systems   Constitutional: Negative for appetite change, chills, fatigue and fever.   HENT: Positive for rhinorrhea. Negative for congestion, ear pain, hearing loss, postnasal drip, sinus pressure, sore throat and tinnitus.    Eyes: Negative for redness, itching and visual disturbance.   Respiratory: Positive for shortness of breath. Negative for cough, chest tightness and  wheezing.    Cardiovascular: Positive for chest pain and leg swelling. Negative for palpitations.   Gastrointestinal: Positive for abdominal pain (Reflux) and vomiting. Negative for constipation, diarrhea and nausea.   Genitourinary: Negative for decreased urine volume, difficulty urinating, dysuria, frequency, hematuria and urgency.   Musculoskeletal: Negative for back pain, myalgias, neck pain and neck stiffness.   Skin: Negative for rash.   Neurological: Positive for headaches. Negative for dizziness and light-headedness.   Psychiatric/Behavioral: Negative.        Objective:      Physical Exam   Constitutional: He is oriented to person, place, and time. He appears well-developed and well-nourished. No distress.   HENT:   Head: Normocephalic and atraumatic.   Right Ear: External ear normal.   Left Ear: External ear normal.   Nose: Nose normal.   Mouth/Throat: Oropharynx is clear and moist. No oropharyngeal exudate.   Eyes: Conjunctivae and EOM are normal. Pupils are equal, round, and reactive to light. Right eye exhibits no discharge. Left eye exhibits no discharge. No scleral icterus.   Neck: Normal range of motion. Neck supple. No JVD present. No tracheal deviation present. No thyromegaly present.   Cardiovascular: Normal rate, regular rhythm, normal heart sounds and intact distal pulses.  Exam reveals no gallop and no friction rub.    No murmur heard.  Pulmonary/Chest: Effort normal and breath sounds normal. No stridor. No respiratory distress. He has no wheezes. He has no rales.   Abdominal: Soft. Bowel sounds are normal. He exhibits no distension and no mass. There is no tenderness. There is no rebound and no guarding.   Musculoskeletal: Normal range of motion. He exhibits no edema or tenderness.   Lymphadenopathy:     He has no cervical adenopathy.   Neurological: He is alert and oriented to person, place, and time.   Skin: Skin is warm and dry. No rash noted. He is not diaphoretic. No erythema. No pallor.    Psychiatric: He has a normal mood and affect. His behavior is normal. Judgment and thought content normal.   Nursing note and vitals reviewed.      Assessment:       1. Well adult exam    2. Cavernous malformation    3. Coronary artery disease due to calcified coronary lesion    4. Essential hypertension    5. Hyperlipidemia, unspecified hyperlipidemia type    6. Prediabetes    7. Ascending aorta dilatation    8. Attention deficit hyperactivity disorder (ADHD), combined type    9. Hypogonadism male    10. Benign prostatic hyperplasia with lower urinary tract symptoms, symptom details unspecified    11. Elevated PSA    12. History of colonic polyps    13. Obesity, unspecified classification, unspecified obesity type, unspecified whether serious comorbidity present    14. Gastroesophageal reflux disease, esophagitis presence not specified    15. Colon cancer screening        Plan:       1.  Labs have been reviewed and are overall within normal limits except for an elevated PSA and elevated cholesterol.  2.  Continue follow-up with neurology as scheduled.  Cavernous formation is stable.  3.  Patient has recently followed up with cardiology and is scheduled for a stress test next week.  He has been started on amlodipine 5 mg daily and continue lisinopril 40 mg daily.  Hypertension is improving.  4.  Patient has been started on Crestor with repeat metabolic panel and lipid panel in 6 weeks through cardiology.  5.  Continue diet and exercise.  Follow-up with psychiatry for further evaluation and treatment of ADHD and anxiety.  Recommend discontinuation of stimulants.  6.  Follow-up with urology as scheduled.  PSA is elevated.  7.  Screening colonoscopy.  8.  Encourage diet and exercise for weight loss.  9.  Start pantoprazole 40 mg daily.  10.  Return to clinic as needed or in one year for annual exam.

## 2018-01-28 ENCOUNTER — PATIENT MESSAGE (OUTPATIENT)
Dept: UROLOGY | Facility: CLINIC | Age: 63
End: 2018-01-28

## 2018-01-29 ENCOUNTER — HOSPITAL ENCOUNTER (OUTPATIENT)
Dept: CARDIOLOGY | Facility: CLINIC | Age: 63
Discharge: HOME | End: 2018-01-29
Attending: NURSE PRACTITIONER
Payer: COMMERCIAL

## 2018-01-29 ENCOUNTER — TELEPHONE (OUTPATIENT)
Dept: UROLOGY | Facility: CLINIC | Age: 63
End: 2018-01-29

## 2018-01-29 DIAGNOSIS — R06.09 DOE (DYSPNEA ON EXERTION): ICD-10-CM

## 2018-01-29 DIAGNOSIS — I77.810 ASCENDING AORTA DILATATION: Chronic | ICD-10-CM

## 2018-01-29 LAB
AORTIC VALVE REGURGITATION: NORMAL
DIASTOLIC DYSFUNCTION: NO
ESTIMATED PA SYSTOLIC PRESSURE: 24.72
MITRAL VALVE REGURGITATION: NORMAL
RETIRED EF AND QEF - SEE NOTES: 60 (ref 55–65)
TRICUSPID VALVE REGURGITATION: NORMAL

## 2018-01-29 PROCEDURE — 93351 STRESS TTE COMPLETE: CPT | Mod: S$GLB,,, | Performed by: INTERNAL MEDICINE

## 2018-01-29 PROCEDURE — 93320 DOPPLER ECHO COMPLETE: CPT | Mod: S$GLB,,, | Performed by: INTERNAL MEDICINE

## 2018-01-29 PROCEDURE — 93325 DOPPLER ECHO COLOR FLOW MAPG: CPT | Mod: S$GLB,,, | Performed by: INTERNAL MEDICINE

## 2018-01-30 ENCOUNTER — TELEPHONE (OUTPATIENT)
Dept: CARDIOLOGY | Facility: CLINIC | Age: 63
End: 2018-01-30

## 2018-01-30 NOTE — TELEPHONE ENCOUNTER
Called patient to inform him that his TWYLA was negative for ischemia. Patient verbalized understanding.

## 2018-02-01 ENCOUNTER — LAB VISIT (OUTPATIENT)
Dept: LAB | Facility: HOSPITAL | Age: 63
End: 2018-02-01
Attending: UROLOGY
Payer: COMMERCIAL

## 2018-02-01 DIAGNOSIS — E29.1 HYPOGONADISM MALE: ICD-10-CM

## 2018-02-01 LAB — COMPLEXED PSA SERPL-MCNC: 10.8 NG/ML

## 2018-02-01 PROCEDURE — 84153 ASSAY OF PSA TOTAL: CPT

## 2018-02-01 PROCEDURE — 36415 COLL VENOUS BLD VENIPUNCTURE: CPT | Mod: PO

## 2018-02-05 ENCOUNTER — OFFICE VISIT (OUTPATIENT)
Dept: UROLOGY | Facility: CLINIC | Age: 63
End: 2018-02-05
Attending: UROLOGY
Payer: COMMERCIAL

## 2018-02-05 VITALS
HEIGHT: 74 IN | HEART RATE: 57 BPM | WEIGHT: 227.94 LBS | SYSTOLIC BLOOD PRESSURE: 134 MMHG | BODY MASS INDEX: 29.25 KG/M2 | DIASTOLIC BLOOD PRESSURE: 77 MMHG

## 2018-02-05 DIAGNOSIS — D49.59 NEOPLASM OF PROSTATE: ICD-10-CM

## 2018-02-05 DIAGNOSIS — R97.20 ELEVATED PSA: Primary | ICD-10-CM

## 2018-02-05 DIAGNOSIS — N40.1 BENIGN NON-NODULAR PROSTATIC HYPERPLASIA WITH LOWER URINARY TRACT SYMPTOMS: ICD-10-CM

## 2018-02-05 PROCEDURE — 3008F BODY MASS INDEX DOCD: CPT | Mod: S$GLB,,, | Performed by: UROLOGY

## 2018-02-05 PROCEDURE — 81002 URINALYSIS NONAUTO W/O SCOPE: CPT | Mod: S$GLB,,, | Performed by: UROLOGY

## 2018-02-05 PROCEDURE — 99214 OFFICE O/P EST MOD 30 MIN: CPT | Mod: 25,S$GLB,, | Performed by: UROLOGY

## 2018-02-05 RX ORDER — NIACIN 500 MG
250 CAPSULE, EXTENDED RELEASE ORAL NIGHTLY
COMMUNITY

## 2018-02-05 RX ORDER — PREDNISONE 20 MG/1
TABLET ORAL
COMMUNITY
Start: 2017-12-22 | End: 2018-08-17

## 2018-02-05 RX ORDER — ASCORBIC ACID, CHOLECALCIFEROL, .ALPHA.-TOCOPHEROL, DL-, PYRIDOXINE HYDROCHLORIDE, FOLIC ACID, CYANOCOBALAMIN, CALCIUM CARBONATE, FERROUS FUMARATE, MAGNESIUM OXIDE AND DOCONEXENT 90; 220; 10; 26; 1; 13; 145; 28; 50; 300 MG/1; [IU]/1; [IU]/1; MG/1; MG/1; UG/1; MG/1; MG/1; MG/1; MG/1
CAPSULE, GELATIN COATED ORAL
COMMUNITY
Start: 2018-02-04 | End: 2018-08-28 | Stop reason: CLARIF

## 2018-02-05 NOTE — PROGRESS NOTES
"Subjective:      Dominguez Zapata is a 62 y.o. male who returns today regarding his hypogonadism and elevated PSA.    He is currently on testosterone IM 100mg every 1 week (his preferred schedule). Previously tried both clomid and testopel but opted to use injections instead. Pre-treatment symptoms included decreased libido.     He also has h/o BPH treated w/ Urolift at [facility] in 2014. More recently he has been bothered by nocturia and started nightly ditropan last year. He states this has been effective at reducing nocturia.    He also has h/o elevated PSA, s/p biopsy in [place] in 2009 (negative) and MRI in 2015 (low probability of tumor).  His highest known PSA was 14.6 in August 2017.    The following portions of the patient's history were reviewed and updated as appropriate: allergies, current medications, past family history, past medical history, past social history, past surgical history and problem list.    Review of Systems  A comprehensive multipoint review of systems was negative except as otherwise stated in the HPI.     Objective:   Vitals: /77 (BP Location: Left arm, Patient Position: Sitting, BP Method: Large (Automatic))   Pulse (!) 57   Ht 6' 1.5" (1.867 m)   Wt 103.4 kg (227 lb 15.3 oz)   BMI 29.67 kg/m²     Physical Exam   General: alert and oriented, no acute distress  Respiratory: Symmetric expansion, non-labored breathing  Neuro: no gross deficits  Psych: normal judgment and insight, normal mood/affect and non-anxious    Lab Review   Urinalysis demonstrates negative for all components  Lab Results   Component Value Date    WBC 8.11 01/19/2018    HGB 17.2 01/19/2018    HCT 52.2 01/19/2018    MCV 88 01/19/2018     01/19/2018     Lab Results   Component Value Date    CREATININE 1.3 01/19/2018    BUN 15 01/19/2018     Component PSA Total PSA, Free PSA, Free Pct   Latest Ref Rng & Units 0.00 - 4.00 ng/mL 0.01 - 1.50 ng/mL Not established %   2/1/2018 10.8 (H)     1/19/2018 15.4 " (H)     8/8/2017 11.1 (H) 2.46 (H) 22.16   8/2/2017 14.6 (H) 2.61 (H) 17.88   1/10/2017 4.8 (H)     7/6/2016 6.8 (H)         Component Testosterone, Total   Latest Ref Rng & Units 195.0 - 1138.0 ng/dL   1/19/2018 1099 (day 5 of cycle)   5/2/2017 446   4/13/2017 657   3/16/2017 1078       Assessment and Plan:   1. Hypogonadism male  -- Continue injections - 100mg every week    2. Nocturia  -- Continue ditropan qhs  -- Continue daily Cialis    3. Elevated PSA  -- Suspect benign etiology given his history, low percent free, and volatile levels  -- Will repeat prostate MRI and biopsy if indicated    FU 6 mos w/ PSA

## 2018-02-06 LAB
BILIRUB SERPL-MCNC: NORMAL MG/DL
BLOOD URINE, POC: NORMAL
COLOR, POC UA: YELLOW
GLUCOSE UR QL STRIP: NORMAL
KETONES UR QL STRIP: NORMAL
LEUKOCYTE ESTERASE URINE, POC: NORMAL
NITRITE, POC UA: NORMAL
PH, POC UA: 5
PROTEIN, POC: NORMAL
SPECIFIC GRAVITY, POC UA: 1.01
UROBILINOGEN, POC UA: NORMAL

## 2018-02-09 ENCOUNTER — HOSPITAL ENCOUNTER (OUTPATIENT)
Dept: RADIOLOGY | Facility: HOSPITAL | Age: 63
Discharge: HOME | End: 2018-02-09
Attending: UROLOGY
Payer: COMMERCIAL

## 2018-02-09 DIAGNOSIS — R97.20 ELEVATED PSA: ICD-10-CM

## 2018-02-09 DIAGNOSIS — D49.59 NEOPLASM OF PROSTATE: ICD-10-CM

## 2018-02-09 PROCEDURE — 25500020 PHARM REV CODE 255: Performed by: UROLOGY

## 2018-02-09 PROCEDURE — A9585 GADOBUTROL INJECTION: HCPCS | Performed by: UROLOGY

## 2018-02-09 PROCEDURE — 72197 MRI PELVIS W/O & W/DYE: CPT | Mod: 26,,, | Performed by: RADIOLOGY

## 2018-02-09 PROCEDURE — 72197 MRI PELVIS W/O & W/DYE: CPT | Mod: TC

## 2018-02-09 RX ORDER — GADOBUTROL 604.72 MG/ML
10 INJECTION INTRAVENOUS
Status: COMPLETED | OUTPATIENT
Start: 2018-02-09 | End: 2018-02-09

## 2018-02-09 RX ADMIN — GADOBUTROL 10 ML: 604.72 INJECTION INTRAVENOUS at 05:02

## 2018-02-14 ENCOUNTER — PATIENT MESSAGE (OUTPATIENT)
Dept: INTERNAL MEDICINE | Facility: CLINIC | Age: 63
End: 2018-02-14

## 2018-02-14 DIAGNOSIS — R06.02 SHORTNESS OF BREATH: Primary | ICD-10-CM

## 2018-02-16 ENCOUNTER — PATIENT MESSAGE (OUTPATIENT)
Dept: INTERNAL MEDICINE | Facility: CLINIC | Age: 63
End: 2018-02-16

## 2018-02-19 ENCOUNTER — HOSPITAL ENCOUNTER (OUTPATIENT)
Dept: PULMONOLOGY | Facility: CLINIC | Age: 63
Discharge: HOME | End: 2018-02-19
Payer: COMMERCIAL

## 2018-02-19 DIAGNOSIS — R06.02 SHORTNESS OF BREATH: ICD-10-CM

## 2018-02-19 LAB
POST FEV1 FVC: 0.78
POST FEV1: 3.31
POST FVC: 4.24
PRE FEV1 FVC: 73
PRE FEV1: 3.1
PRE FVC: 4.22
PREDICTED FEV1 FVC: 78
PREDICTED FEV1: 4.16
PREDICTED FVC: 5.19

## 2018-02-19 PROCEDURE — 94060 EVALUATION OF WHEEZING: CPT | Mod: S$GLB,,, | Performed by: INTERNAL MEDICINE

## 2018-02-19 PROCEDURE — 94729 DIFFUSING CAPACITY: CPT | Mod: S$GLB,,, | Performed by: INTERNAL MEDICINE

## 2018-02-20 ENCOUNTER — PATIENT MESSAGE (OUTPATIENT)
Dept: INTERNAL MEDICINE | Facility: CLINIC | Age: 63
End: 2018-02-20

## 2018-02-20 DIAGNOSIS — J45.20 MILD INTERMITTENT ASTHMA WITHOUT COMPLICATION: ICD-10-CM

## 2018-02-20 RX ORDER — FLUTICASONE FUROATE AND VILANTEROL 100; 25 UG/1; UG/1
1 POWDER RESPIRATORY (INHALATION) DAILY
Qty: 60 EACH | Refills: 11 | Status: SHIPPED | OUTPATIENT
Start: 2018-02-20 | End: 2018-08-28 | Stop reason: CLARIF

## 2018-03-04 RX ORDER — GUANFACINE 2 MG/1
1 TABLET, EXTENDED RELEASE ORAL DAILY
Qty: 30 TABLET | Refills: 5 | Status: SHIPPED | OUTPATIENT
Start: 2018-03-04 | End: 2019-01-14

## 2018-03-08 ENCOUNTER — LAB VISIT (OUTPATIENT)
Dept: LAB | Facility: HOSPITAL | Age: 63
End: 2018-03-08
Attending: FAMILY MEDICINE
Payer: COMMERCIAL

## 2018-03-08 DIAGNOSIS — E78.5 HYPERLIPIDEMIA, UNSPECIFIED HYPERLIPIDEMIA TYPE: ICD-10-CM

## 2018-03-08 DIAGNOSIS — I10 ESSENTIAL HYPERTENSION: ICD-10-CM

## 2018-03-08 DIAGNOSIS — R73.03 PREDIABETES: ICD-10-CM

## 2018-03-08 LAB
ALBUMIN SERPL BCP-MCNC: 3.7 G/DL
ALP SERPL-CCNC: 42 U/L
ALT SERPL W/O P-5'-P-CCNC: 23 U/L
ANION GAP SERPL CALC-SCNC: 7 MMOL/L
AST SERPL-CCNC: 35 U/L
BILIRUB SERPL-MCNC: 0.4 MG/DL
BUN SERPL-MCNC: 12 MG/DL
CALCIUM SERPL-MCNC: 9.5 MG/DL
CHLORIDE SERPL-SCNC: 103 MMOL/L
CHOLEST SERPL-MCNC: 200 MG/DL
CHOLEST/HDLC SERPL: 5.1 {RATIO}
CO2 SERPL-SCNC: 30 MMOL/L
CREAT SERPL-MCNC: 1.3 MG/DL
EST. GFR  (AFRICAN AMERICAN): >60 ML/MIN/1.73 M^2
EST. GFR  (NON AFRICAN AMERICAN): 58.1 ML/MIN/1.73 M^2
GLUCOSE SERPL-MCNC: 130 MG/DL
HDLC SERPL-MCNC: 39 MG/DL
HDLC SERPL: 19.5 %
LDLC SERPL CALC-MCNC: 125.6 MG/DL
NONHDLC SERPL-MCNC: 161 MG/DL
POTASSIUM SERPL-SCNC: 4.9 MMOL/L
PROT SERPL-MCNC: 7.3 G/DL
SODIUM SERPL-SCNC: 140 MMOL/L
TRIGL SERPL-MCNC: 177 MG/DL

## 2018-03-08 PROCEDURE — 80053 COMPREHEN METABOLIC PANEL: CPT

## 2018-03-08 PROCEDURE — 80061 LIPID PANEL: CPT

## 2018-03-08 PROCEDURE — 36415 COLL VENOUS BLD VENIPUNCTURE: CPT | Mod: PO

## 2018-03-09 ENCOUNTER — OFFICE VISIT (OUTPATIENT)
Dept: PULMONOLOGY | Facility: CLINIC | Age: 63
End: 2018-03-09
Payer: COMMERCIAL

## 2018-03-09 ENCOUNTER — HOSPITAL ENCOUNTER (OUTPATIENT)
Dept: RADIOLOGY | Facility: HOSPITAL | Age: 63
Discharge: HOME | End: 2018-03-09
Attending: INTERNAL MEDICINE
Payer: COMMERCIAL

## 2018-03-09 VITALS
HEIGHT: 73 IN | RESPIRATION RATE: 12 BRPM | SYSTOLIC BLOOD PRESSURE: 158 MMHG | WEIGHT: 223.31 LBS | BODY MASS INDEX: 29.6 KG/M2 | OXYGEN SATURATION: 96 % | HEART RATE: 52 BPM | DIASTOLIC BLOOD PRESSURE: 98 MMHG

## 2018-03-09 DIAGNOSIS — J98.6 ELEVATED DIAPHRAGM: ICD-10-CM

## 2018-03-09 DIAGNOSIS — R05.9 COUGH: ICD-10-CM

## 2018-03-09 DIAGNOSIS — J20.9 ACUTE BRONCHITIS, UNSPECIFIED ORGANISM: ICD-10-CM

## 2018-03-09 DIAGNOSIS — R06.02 SHORTNESS OF BREATH: ICD-10-CM

## 2018-03-09 DIAGNOSIS — R06.02 SHORTNESS OF BREATH: Primary | ICD-10-CM

## 2018-03-09 DIAGNOSIS — R94.2 ABNORMAL PFTS (PULMONARY FUNCTION TESTS): ICD-10-CM

## 2018-03-09 DIAGNOSIS — R05.9 COUGH: Primary | ICD-10-CM

## 2018-03-09 PROCEDURE — 71250 CT THORAX DX C-: CPT | Mod: TC

## 2018-03-09 PROCEDURE — 99999 PR PBB SHADOW E&M-EST. PATIENT-LVL III: CPT | Mod: PBBFAC,,, | Performed by: INTERNAL MEDICINE

## 2018-03-09 PROCEDURE — 71250 CT THORAX DX C-: CPT | Mod: 26,,, | Performed by: RADIOLOGY

## 2018-03-09 PROCEDURE — 3077F SYST BP >= 140 MM HG: CPT | Mod: S$GLB,,, | Performed by: INTERNAL MEDICINE

## 2018-03-09 PROCEDURE — 71046 X-RAY EXAM CHEST 2 VIEWS: CPT | Mod: TC,FY

## 2018-03-09 PROCEDURE — 71046 X-RAY EXAM CHEST 2 VIEWS: CPT | Mod: 26,,, | Performed by: RADIOLOGY

## 2018-03-09 PROCEDURE — 99204 OFFICE O/P NEW MOD 45 MIN: CPT | Mod: S$GLB,,, | Performed by: INTERNAL MEDICINE

## 2018-03-09 PROCEDURE — 3080F DIAST BP >= 90 MM HG: CPT | Mod: S$GLB,,, | Performed by: INTERNAL MEDICINE

## 2018-03-09 RX ORDER — ALBUTEROL SULFATE 90 UG/1
2 AEROSOL, METERED RESPIRATORY (INHALATION) EVERY 4 HOURS PRN
Qty: 18 G | Refills: 3 | Status: SHIPPED | OUTPATIENT
Start: 2018-03-09 | End: 2023-05-18

## 2018-03-09 NOTE — PROGRESS NOTES
Subjective:       Patient ID: Dominguez Zapata is a 62 y.o. male.    Chief Complaint: Shortness of Breath    HPI HISTORY OF PRESENT ILLNESS:  Mr. Zapata is a 62-year-old nonsmoker, who   comes to have further evaluation of exertional dyspnea.  He reports that he   exercises on a regular basis.  His routine includes running several days per   week, which he has done for many years.  He has generally not been aware of any   limitation in his exercise capacity due to respiratory problems.  However, near   the end of last year he developed an upper respiratory infection with symptoms   of cough and shortness of breath.  He was treated with antibiotics for a   suspected acute bronchitis.  His cough and congestion resolved, but he has   continued to be short of breath when he tries to run.    Mr. Zapata has not been aware of any associated wheezing.  He had a stress   echocardiogram to investigate the possibility of cardiac disease.  This did not   show any evidence for ischemia or cardiac dysfunction.  He states that he had   to discontinue the exercise test due to shortness of breath.  He has recently   been prescribed bronchodilator medications.  He had a spirometry study, which   showed mild obstruction and a possible bronchodilator response.  He has deferred   starting these medications until after he has the visit here.    Mr. Zapata does not know of any history for asthma or chronic lung diseases.    He is a lifelong nonsmoker.  He believes his family history is negative for   lung disease.  He does not have any significant occupational exposures.    DATA:  I reviewed the images from a chest x-ray done earlier today.  The cardiac   silhouette is not enlarged.  There are no acute cardiovascular abnormalities.    There is modest elevation (versus eventration) of the left hemidiaphragm.    Similar features are seen in the chest x-ray from December.  The right lung   appears clear.    Pulmonary  function studies done last month show the forced vital capacity is   4.22 L, which is 81% of predicted.  The FEV1 is 3.10 L or 75% of predicted.  The   ratio of these values is 73%.  The mid expiratory flow rate is reduced at 53%   of predicted.  Following an aerosol bronchodilator, there is a borderline   increase in the FEV1.  Measurement of the diffusion capacity    shows a value of 34.7, which is 128% of the expected value.      CB/HN  dd: 03/09/2018 19:15:47 (CST)  td: 03/10/2018 10:17:41 (CST)  Doc ID   #[id number]  Job ID #[id number]    CC:       Review of Systems   Constitutional: Negative for fever and fatigue.   HENT: Negative for postnasal drip, sinus pressure, voice change and congestion.    Respiratory: Positive for dyspnea on extertion. Negative for cough, sputum production, shortness of breath and wheezing.    Cardiovascular: Negative for chest pain and leg swelling.   Genitourinary: Negative for difficulty urinating.   Musculoskeletal: Negative for arthralgias and back pain.   Skin: Negative for rash.   Gastrointestinal: Positive for acid reflux. Negative for abdominal pain.   Neurological: Negative for dizziness and weakness.   Hematological: Negative for adenopathy.       Objective:      Physical Exam   Constitutional: He is oriented to person, place, and time. He appears well-developed and well-nourished.   HENT:   Head: Normocephalic.   Mouth/Throat: Oropharynx is clear and moist. No oropharyngeal exudate.   Neck: Normal range of motion. Neck supple. No JVD present. No tracheal deviation present. No thyromegaly present.   Cardiovascular: Regular rhythm and normal heart sounds.    No murmur heard.  Regular bradycardia.   Pulmonary/Chest: Normal expansion. No stridor. He has decreased breath sounds (decreased air exchange at L base). He has no wheezes. He has no rhonchi. He has no rales. He exhibits no tenderness.   Abdominal: Soft.   Musculoskeletal: He exhibits no edema.   Lymphadenopathy:     He has no  cervical adenopathy.   Neurological: He is alert and oriented to person, place, and time.   Skin: Skin is warm and dry. No rash noted. No erythema. Nails show no clubbing.   Psychiatric: He has a normal mood and affect.   Vitals reviewed.    Personal Diagnostic Review    No flowsheet data found.      Assessment:       1. Shortness of breath    2. Elevated diaphragm    3. Acute bronchitis, unspecified organism    4. Cough    5. Abnormal PFTs (pulmonary function tests)        Outpatient Encounter Prescriptions as of 3/9/2018   Medication Sig Dispense Refill    albuterol 90 mcg/actuation inhaler Inhale 2 puffs into the lungs every 4 (four) hours as needed for Shortness of Breath. Rescue 18 g 3    amLODIPine (NORVASC) 5 MG tablet Take 1 tablet (5 mg total) by mouth once daily. 30 tablet 11    benzonatate (TESSALON PERLES) 100 MG capsule Take 1 capsule (100 mg total) by mouth every 6 (six) hours as needed. 30 capsule 0    CHOLECALCIFEROL, VITAMIN D3, (VITAMIN D3 ORAL) Take 600 mg by mouth once daily.      dextroamphetamine-amphetamine (ADDERALL) 5 mg Tab Take 7.5 mg by mouth 2 (two) times daily. 90 tablet 0    DOCOSAHEXANOIC ACID/EPA (FISH OIL ORAL) Take 1 capsule by mouth once daily.      doxycycline (VIBRA-TABS) 100 MG tablet Take 1 tablet (100 mg total) by mouth once daily. 30 tablet 11    fluticasone-vilanterol (BREO) 100-25 mcg/dose diskus inhaler Inhale 1 puff into the lungs once daily. Controller 60 each 11    GLUC GONZALEZ/CHONDRO GONZALEZ A/VIT C/MN (GLUCOSAMINE CHONDROITIN MAXSTR ORAL) Take 1 tablet by mouth 2 (two) times daily.      guanFACINE 1 mg Tb24       guanFACINE 2 mg Tb24 TAKE 1 TABLET BY MOUTH ONCE DAILY. 30 tablet 5    lancets (ONE TOUCH DELICA LANCETS) 33 gauge Misc 1 lancet by Misc.(Non-Drug; Combo Route) route Daily. 50 each 11    lisinopril (PRINIVIL,ZESTRIL) 40 MG tablet Take 1 tablet (40 mg total) by mouth once daily. 30 tablet 11    MAGNESIUM ORAL Take 1 tablet by mouth once daily.       metFORMIN (GLUCOPHAGE) 500 MG tablet TAKE ONE TABLET BY MOUTH DAILY WITH BREAKFAST 240 tablet 1    modafinil (PROVIGIL) 200 MG Tab 1 pill PO in AM and the second pill early afternoon PRN sleepiness. 60 tablet 5    multivitamin capsule Take 1 capsule by mouth once daily.      niacin 500 MG CpSR Take 250 mg by mouth every evening.      oxybutynin (DITROPAN) 5 MG Tab Take 1 tablet (5 mg total) by mouth every evening. 90 tablet 3    pantoprazole (PROTONIX) 40 MG tablet Take 1 tablet (40 mg total) by mouth once daily. 30 tablet 11    predniSONE (DELTASONE) 20 MG tablet       PRENATE DHA 28 mg iron-1 mg -300 mg Cap       rosuvastatin (CRESTOR) 20 MG tablet Take 1 tablet (20 mg total) by mouth once daily. 30 tablet 11    tadalafil (CIALIS) 5 MG tablet Take 1 tablet (5 mg total) by mouth once daily. 30 tablet 11    taurine 1,000 mg Cap Take by mouth once daily.       testosterone cypionate (DEPOTESTOTERONE CYPIONATE) 200 mg/mL injection Inject 1.5 mLs (300 mg total) into the muscle every 14 (fourteen) days. 10 mL 1    UBIDECARENONE (COENZYME Q10 ORAL) Take 1 tablet by mouth once daily.      VITAMIN K2 ORAL Take 1 tablet by mouth once daily.      [DISCONTINUED] albuterol 90 mcg/actuation inhaler Inhale 2 puffs into the lungs every 6 (six) hours as needed for Wheezing. Rescue 18 g 0     No facility-administered encounter medications on file as of 3/9/2018.      Orders Placed This Encounter   Procedures    CT Chest Without Contrast     Standing Status:   Future     Number of Occurrences:   1     Standing Expiration Date:   3/9/2019    X-Ray Chest PA And Lateral     Standing Status:   Future     Standing Expiration Date:   3/9/2019    Spirometry without Bronchodilator     Standing Status:   Future     Standing Expiration Date:   11/8/2018     Plan:     CT Chest (phone report).  Begin trial with Albuterol HFA 20 min prior to exercise.  Return visit 3 months with CXR and Spirometry before.

## 2018-03-09 NOTE — PATIENT INSTRUCTIONS
CT Chest (phone report).  Begin trial with Albuterol HFA 20 min prior to exercise.  Return visit 3 months with CXR and Spirometry before.

## 2018-03-28 ENCOUNTER — PATIENT MESSAGE (OUTPATIENT)
Dept: INTERNAL MEDICINE | Facility: CLINIC | Age: 63
End: 2018-03-28

## 2018-04-01 ENCOUNTER — PATIENT MESSAGE (OUTPATIENT)
Dept: CARDIOLOGY | Facility: CLINIC | Age: 63
End: 2018-04-01

## 2018-04-02 ENCOUNTER — PATIENT MESSAGE (OUTPATIENT)
Dept: PULMONOLOGY | Facility: CLINIC | Age: 63
End: 2018-04-02

## 2018-04-03 DIAGNOSIS — F90.2 ATTENTION DEFICIT HYPERACTIVITY DISORDER (ADHD), COMBINED TYPE: ICD-10-CM

## 2018-04-03 DIAGNOSIS — J98.6 ELEVATED DIAPHRAGM: Primary | ICD-10-CM

## 2018-04-03 RX ORDER — GUANFACINE 1 MG/1
1 TABLET, EXTENDED RELEASE ORAL DAILY
Qty: 30 TABLET | Refills: 5 | Status: SHIPPED | OUTPATIENT
Start: 2018-04-03 | End: 2019-09-06

## 2018-04-06 DIAGNOSIS — G47.19 EXCESSIVE DAYTIME SLEEPINESS: ICD-10-CM

## 2018-04-06 RX ORDER — ARMODAFINIL 200 MG/1
200 TABLET ORAL 2 TIMES DAILY
Qty: 60 TABLET | Refills: 0 | Status: SHIPPED | OUTPATIENT
Start: 2018-04-06 | End: 2018-04-13 | Stop reason: SDUPTHER

## 2018-04-13 ENCOUNTER — HOSPITAL ENCOUNTER (OUTPATIENT)
Dept: RADIOLOGY | Facility: HOSPITAL | Age: 63
Discharge: HOME | End: 2018-04-13
Attending: INTERNAL MEDICINE
Payer: COMMERCIAL

## 2018-04-13 DIAGNOSIS — G47.19 EXCESSIVE DAYTIME SLEEPINESS: ICD-10-CM

## 2018-04-13 DIAGNOSIS — J98.6 ELEVATED DIAPHRAGM: ICD-10-CM

## 2018-04-13 PROCEDURE — 76000 FLUOROSCOPY <1 HR PHYS/QHP: CPT | Mod: 26,,, | Performed by: RADIOLOGY

## 2018-04-13 PROCEDURE — 76000 FLUOROSCOPY <1 HR PHYS/QHP: CPT | Mod: TC

## 2018-04-15 ENCOUNTER — PATIENT MESSAGE (OUTPATIENT)
Dept: SLEEP MEDICINE | Facility: CLINIC | Age: 63
End: 2018-04-15

## 2018-04-16 ENCOUNTER — PATIENT MESSAGE (OUTPATIENT)
Dept: PULMONOLOGY | Facility: CLINIC | Age: 63
End: 2018-04-16

## 2018-04-16 ENCOUNTER — TELEPHONE (OUTPATIENT)
Dept: SLEEP MEDICINE | Facility: CLINIC | Age: 63
End: 2018-04-16

## 2018-04-16 RX ORDER — ARMODAFINIL 200 MG/1
200 TABLET ORAL 2 TIMES DAILY
Qty: 60 TABLET | Refills: 3 | Status: SHIPPED | OUTPATIENT
Start: 2018-04-16 | End: 2018-04-17 | Stop reason: ALTCHOICE

## 2018-04-16 NOTE — TELEPHONE ENCOUNTER
Zoey,    Please see if he wants to schedule annual follow up with me ASAP (recommended) and we can also put him on our cancellation list (please see below).      Thanks!  --------------------------        Dear Mr. Zapata       I looked through your medical chart over the last year.  I have also looked at your at BPAP download - it still helps well as far as Obstructive sleep apnea (ASPEN) is concerned.  I do not mind ordering another study for you, but we need to clarify some things first:    A. Do you feel that BOPAP is not helping you quite as well?  Do you feel that BPAP is not doing enough for you lately (i.e. Not enough air/too much air/too fast vs too slow)? Do you wake up more often?  Are you feeling more tired?      B. Are you just concerned if it's really doing enough for you because of your recent diagnosis?         Since the numbers look decent on the BPAP download, we need to find a good reason to justify another study to the insurance.   Also, if we need to see if you are breathing deep enough (and BPAP can help with that) we may need to give very specific instructions to the technician for your study (that's not going to be a garden variety CPAP titration)      Do you want to come to the clinic first for annual follow up to discuss everything in detail?          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 This effects how I sleep, and how by cpap works. Refer to my recent tests at [facility].

## 2018-04-17 ENCOUNTER — OFFICE VISIT (OUTPATIENT)
Dept: SLEEP MEDICINE | Facility: CLINIC | Age: 63
End: 2018-04-17
Payer: COMMERCIAL

## 2018-04-17 VITALS
HEIGHT: 73 IN | BODY MASS INDEX: 28.52 KG/M2 | HEART RATE: 62 BPM | WEIGHT: 215.19 LBS | SYSTOLIC BLOOD PRESSURE: 126 MMHG | DIASTOLIC BLOOD PRESSURE: 76 MMHG

## 2018-04-17 DIAGNOSIS — G47.19 EXCESSIVE DAYTIME SLEEPINESS: Primary | ICD-10-CM

## 2018-04-17 DIAGNOSIS — G47.33 OSA (OBSTRUCTIVE SLEEP APNEA): ICD-10-CM

## 2018-04-17 DIAGNOSIS — G47.00 INSOMNIA, UNSPECIFIED TYPE: ICD-10-CM

## 2018-04-17 DIAGNOSIS — J98.6 DIAPHRAGM PARALYSIS: ICD-10-CM

## 2018-04-17 PROCEDURE — 99999 PR PBB SHADOW E&M-EST. PATIENT-LVL III: CPT | Mod: PBBFAC,,, | Performed by: PSYCHIATRY & NEUROLOGY

## 2018-04-17 PROCEDURE — 99204 OFFICE O/P NEW MOD 45 MIN: CPT | Mod: S$GLB,,, | Performed by: PSYCHIATRY & NEUROLOGY

## 2018-04-17 PROCEDURE — 3078F DIAST BP <80 MM HG: CPT | Mod: CPTII,S$GLB,, | Performed by: PSYCHIATRY & NEUROLOGY

## 2018-04-17 PROCEDURE — 3074F SYST BP LT 130 MM HG: CPT | Mod: CPTII,S$GLB,, | Performed by: PSYCHIATRY & NEUROLOGY

## 2018-04-17 RX ORDER — MODAFINIL 200 MG/1
TABLET ORAL
Qty: 60 TABLET | Refills: 0 | Status: SHIPPED | OUTPATIENT
Start: 2018-04-17 | End: 2018-08-28 | Stop reason: CLARIF

## 2018-04-17 NOTE — PATIENT INSTRUCTIONS
[email or web address]    or you can just go to [email or web address] and look for -Blood-[email or web address]-Rate-[email or web address]-[email or web address] in the search window.    SLEEP LAB ([facility]) will contact you to schedulethe sleep study. Their number is [phone] (ext 2). Please call them if you do not hear from them in 10 business days from now.  The [facility] is located on 7th floor of the [facility]; [place] lab is located in [facility].    SLEEP CLINIC (my assistant) will call you when the sleep study results are ready - if you have not heard from us by 2 weeks from the date of the study, please call [phone] (ext 1) or you can use [facility] to contact me.    You are advised to abstain from driving should you feel sleepy or drowsy.    Bilevel titration.  Try to keep PS (difference between IPAP and EPAP) as high as possible due to paralyzed hemidiaphragm. You may need to elevate head of the bed. If centrals emerge, you may need to dial back on pressure support. Please ensure good pulse pulse ox
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
## 2018-04-26 ENCOUNTER — PATIENT MESSAGE (OUTPATIENT)
Dept: PULMONOLOGY | Facility: CLINIC | Age: 63
End: 2018-04-26

## 2018-04-26 ENCOUNTER — PATIENT MESSAGE (OUTPATIENT)
Dept: SLEEP MEDICINE | Facility: CLINIC | Age: 63
End: 2018-04-26

## 2018-04-26 ENCOUNTER — TELEPHONE (OUTPATIENT)
Dept: SLEEP MEDICINE | Facility: CLINIC | Age: 63
End: 2018-04-26

## 2018-04-26 ENCOUNTER — PATIENT MESSAGE (OUTPATIENT)
Dept: INTERNAL MEDICINE | Facility: CLINIC | Age: 63
End: 2018-04-26

## 2018-04-26 ENCOUNTER — TELEPHONE (OUTPATIENT)
Dept: SLEEP MEDICINE | Facility: OTHER | Age: 63
End: 2018-04-26

## 2018-04-26 DIAGNOSIS — J98.6 DIAPHRAGM PARALYSIS: Primary | ICD-10-CM

## 2018-04-26 RX ORDER — TRAZODONE HYDROCHLORIDE 50 MG/1
TABLET ORAL
Qty: 60 TABLET | Refills: 0 | Status: SHIPPED | OUTPATIENT
Start: 2018-04-26 | End: 2018-05-29 | Stop reason: SDUPTHER

## 2018-04-26 NOTE — TELEPHONE ENCOUNTER
I have referred the patient to neurology for further evaluation.  Please inform the patient.  Thank you.

## 2018-04-26 NOTE — TELEPHONE ENCOUNTER
Since changing his AutoBPAP to:    EPAP min - 10; IPAP max - 24; PS 6-10. Keep ramp at 10/4.        His central apnea index has significantly increased, although his overall breathing amplitude has improved.    Here is the last week download from the time his pressure was changed.       Therapy Event Summary Date Range: 3/26/2018 - 4/24/2018     Hide      Compliance Summary  Apnea Indices  Ventilator Statistics    Days with Device Usage:  29 days  Average AHI:  3.2  Average Breath Rate:  N/A    Percentage of Days >=4 Hours:  93.3%  Average OA Index:  0.7  Average % Patient Triggered Breaths:  N/A    Average Usage (Days Used):  6 hrs. 24 mins. 35 secs.  Average CA Index:  1.5  Average Tidal Volume:  N/A    Average Usage (All Days):  6 hrs. 11 mins. 46 secs.    Average Minute Vent:  N/A        Large Leak  Periodic Breathing     Average Time in Large Leak:  33 secs.  Average % of Night in PB:  0.6%     Average % of Night in Large Leak:  0.1%              I will again change his autoBPAP settings to:  EPAP min - 11; IPAP max - 24; PS 5-8. Keep ramp at 10/4.

## 2018-04-28 ENCOUNTER — PATIENT MESSAGE (OUTPATIENT)
Dept: SLEEP MEDICINE | Facility: CLINIC | Age: 63
End: 2018-04-28

## 2018-04-30 ENCOUNTER — PATIENT MESSAGE (OUTPATIENT)
Dept: SLEEP MEDICINE | Facility: CLINIC | Age: 63
End: 2018-04-30

## 2018-04-30 NOTE — TELEPHONE ENCOUNTER
Dear Mr. Zapata       Your pulse oximetry looked very reassuring. You can also wear it for the real sleep study, and we will compare to see how accurately it works.    As for your recording lately - I don't think it's your condition - looks like your body is unfortunately still fighting with extra air that is trying to help your lungs stretch better, so today I have decreased that baseline number down even further (practicaly to where you were before we started changing it, but with the ability to increase just a cornelio  automatically). The machine yet has to generate a detailed sleep study which it has not done yet this week, so I would not want to go back to default settings 100% for at least 2 more days in the hope to get more information, but I expect this will feel much better. Please let me know in a couple of days    EPAP min11    PS 4-6  IPAP max 23        Sincerely,    Meghana Cid MD

## 2018-05-02 ENCOUNTER — PATIENT MESSAGE (OUTPATIENT)
Dept: SLEEP MEDICINE | Facility: CLINIC | Age: 63
End: 2018-05-02

## 2018-05-02 ENCOUNTER — PATIENT MESSAGE (OUTPATIENT)
Dept: INTERNAL MEDICINE | Facility: CLINIC | Age: 63
End: 2018-05-02

## 2018-05-02 DIAGNOSIS — R94.2 ABNORMAL PFTS (PULMONARY FUNCTION TESTS): Primary | ICD-10-CM

## 2018-05-02 DIAGNOSIS — J98.6 ELEVATED DIAPHRAGM: ICD-10-CM

## 2018-05-02 DIAGNOSIS — R06.02 SHORTNESS OF BREATH: ICD-10-CM

## 2018-05-03 ENCOUNTER — OFFICE VISIT (OUTPATIENT)
Dept: NEUROLOGY | Facility: CLINIC | Age: 63
End: 2018-05-03
Payer: COMMERCIAL

## 2018-05-03 VITALS
DIASTOLIC BLOOD PRESSURE: 71 MMHG | WEIGHT: 210 LBS | HEIGHT: 73 IN | SYSTOLIC BLOOD PRESSURE: 144 MMHG | BODY MASS INDEX: 27.83 KG/M2 | HEART RATE: 51 BPM

## 2018-05-03 DIAGNOSIS — G56.80 PHRENIC NERVE PALSY: Primary | ICD-10-CM

## 2018-05-03 PROCEDURE — 3078F DIAST BP <80 MM HG: CPT | Mod: CPTII,S$GLB,, | Performed by: NEUROMUSCULOSKELETAL MEDICINE & OMM

## 2018-05-03 PROCEDURE — 99999 PR PBB SHADOW E&M-EST. PATIENT-LVL V: CPT | Mod: PBBFAC,,, | Performed by: NEUROMUSCULOSKELETAL MEDICINE & OMM

## 2018-05-03 PROCEDURE — 3077F SYST BP >= 140 MM HG: CPT | Mod: CPTII,S$GLB,, | Performed by: NEUROMUSCULOSKELETAL MEDICINE & OMM

## 2018-05-03 PROCEDURE — 3008F BODY MASS INDEX DOCD: CPT | Mod: CPTII,S$GLB,, | Performed by: NEUROMUSCULOSKELETAL MEDICINE & OMM

## 2018-05-03 PROCEDURE — 99203 OFFICE O/P NEW LOW 30 MIN: CPT | Mod: S$GLB,,, | Performed by: NEUROMUSCULOSKELETAL MEDICINE & OMM
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
## 2018-05-17 RX ORDER — TESTOSTERONE CYPIONATE 200 MG/ML
300 INJECTION, SOLUTION INTRAMUSCULAR
Qty: 10 ML | Refills: 1 | Status: SHIPPED | OUTPATIENT
Start: 2018-05-17 | End: 2019-01-29 | Stop reason: SDUPTHER

## 2018-05-23 ENCOUNTER — TELEPHONE (OUTPATIENT)
Dept: NEUROLOGY | Facility: CLINIC | Age: 63
End: 2018-05-23

## 2018-05-23 ENCOUNTER — HOSPITAL ENCOUNTER (OUTPATIENT)
Dept: SLEEP MEDICINE | Facility: OTHER | Age: 63
Discharge: HOME | End: 2018-05-23
Attending: PSYCHIATRY & NEUROLOGY
Payer: COMMERCIAL

## 2018-05-23 DIAGNOSIS — G47.33 OSA (OBSTRUCTIVE SLEEP APNEA): ICD-10-CM

## 2018-05-23 DIAGNOSIS — J98.6 DIAPHRAGM PARALYSIS: ICD-10-CM

## 2018-05-23 DIAGNOSIS — G47.31 COMPLEX SLEEP APNEA SYNDROME: ICD-10-CM

## 2018-05-23 PROCEDURE — 95811 POLYSOM 6/>YRS CPAP 4/> PARM: CPT

## 2018-05-23 PROCEDURE — 95811 POLYSOM 6/>YRS CPAP 4/> PARM: CPT | Mod: 26,,, | Performed by: PSYCHIATRY & NEUROLOGY

## 2018-05-23 NOTE — TELEPHONE ENCOUNTER
----- Message from Yamilka Welsh sent at 5/23/2018  3:31 PM CDT -----  Contact: Pt. [phone]  Patient Requesting Sooner Appointment.     Reason: Patient was referred by Dr. Richardson for phrenic nerve palsy    Name of Caller: Patient   When is the first available appointment? N/A    Communication Preference  Please contact the patient via telephone to discuss further.

## 2018-05-24 NOTE — PROGRESS NOTES
Dominguez Zapata to [facility] on 5/23/18 for an overnight study. Pt educated on set up and CPAP prior to start of study. Pt set up by TASHA Villalba.    Pt started the night off on BiPAP wearing his own Dreamwear nasal mask w/ chin strap, CFLEX and heated humidity. At 1:30AM I switched the patient's mask to a Lrg Simplus FF mask due to a high mask leak. Pressures explored from 15/8cm -23/15cm H20. At 2:17AM switched patient over to ASV due to recurrent Central Apneas.    EKG Lead II appears in NSR with rare PVCs.    In Am pt reported, he slept okay and was curious about his SAO2 levels.     Technical difficulties with the SAO2 probe, changed and replaced several times throughout night.    Post study information given to pt in AM

## 2018-05-29 DIAGNOSIS — G47.00 INSOMNIA, UNSPECIFIED TYPE: ICD-10-CM

## 2018-05-29 RX ORDER — TRAZODONE HYDROCHLORIDE 50 MG/1
TABLET ORAL
Qty: 60 TABLET | Refills: 0 | Status: SHIPPED | OUTPATIENT
Start: 2018-05-29 | End: 2018-06-27 | Stop reason: SDUPTHER

## 2018-06-01 ENCOUNTER — TELEPHONE (OUTPATIENT)
Dept: NEUROLOGY | Facility: CLINIC | Age: 63
End: 2018-06-01

## 2018-06-01 NOTE — TELEPHONE ENCOUNTER
RN contacted patient and left message regarding June 14th appt at 2:20 with Dr. Kaiser June (sooner availability).

## 2018-06-01 NOTE — TELEPHONE ENCOUNTER
----- Message from Oscar Romero RN sent at 6/1/2018 12:10 PM CDT -----  Contact: Patient @ [phone]  I was most likely looking at sooner access with Dr. June. June 14th when his Procedure(Botox) slots flip to new appt slots.   ----- Message -----  From: Libra Willingham RN  Sent: 6/1/2018  10:56 AM  To: Oscar Romero RN, Mayo Arnold, #    Oscar,    See where you tried to contact patient. Did you have a particular date and time for sooner appt request? [facility]  ----- Message -----  From: Mayo Arnold  Sent: 5/31/2018   3:23 PM  To: Alejandro SWANSON [facility]    2nd Request: Caller is calling to schedule the NP appt from the referral, pls call

## 2018-06-12 ENCOUNTER — PATIENT MESSAGE (OUTPATIENT)
Dept: SLEEP MEDICINE | Facility: CLINIC | Age: 63
End: 2018-06-12

## 2018-06-14 ENCOUNTER — OFFICE VISIT (OUTPATIENT)
Dept: NEUROLOGY | Facility: CLINIC | Age: 63
End: 2018-06-14
Payer: COMMERCIAL

## 2018-06-14 VITALS
HEART RATE: 70 BPM | WEIGHT: 204.13 LBS | SYSTOLIC BLOOD PRESSURE: 130 MMHG | HEIGHT: 73 IN | BODY MASS INDEX: 27.05 KG/M2 | DIASTOLIC BLOOD PRESSURE: 72 MMHG

## 2018-06-14 DIAGNOSIS — I10 ESSENTIAL HYPERTENSION: ICD-10-CM

## 2018-06-14 DIAGNOSIS — J98.6 HEMIDIAPHRAGM PARALYSIS: Primary | ICD-10-CM

## 2018-06-14 DIAGNOSIS — R49.0 DYSPHONIA: Chronic | ICD-10-CM

## 2018-06-14 DIAGNOSIS — E78.5 HYPERLIPIDEMIA, UNSPECIFIED HYPERLIPIDEMIA TYPE: ICD-10-CM

## 2018-06-14 PROCEDURE — 3078F DIAST BP <80 MM HG: CPT | Mod: CPTII,S$GLB,, | Performed by: PSYCHIATRY & NEUROLOGY

## 2018-06-14 PROCEDURE — 3075F SYST BP GE 130 - 139MM HG: CPT | Mod: CPTII,S$GLB,, | Performed by: PSYCHIATRY & NEUROLOGY

## 2018-06-14 PROCEDURE — 99214 OFFICE O/P EST MOD 30 MIN: CPT | Mod: S$GLB,,, | Performed by: PSYCHIATRY & NEUROLOGY

## 2018-06-14 PROCEDURE — 99999 PR PBB SHADOW E&M-EST. PATIENT-LVL III: CPT | Mod: PBBFAC,,, | Performed by: PSYCHIATRY & NEUROLOGY

## 2018-06-14 PROCEDURE — 3008F BODY MASS INDEX DOCD: CPT | Mod: CPTII,S$GLB,, | Performed by: PSYCHIATRY & NEUROLOGY
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
## 2018-06-14 NOTE — LETTER
June 14, 2018      Enrike Richardson MD  [address]  [place] [address]           [facility]  [address]  [place] [address]  Phone: [phone]  Fax: [phone]          Patient: Dominguez Zapata   MR Number: [id number]   YOB: 1955   Date of Visit: 6/14/2018       Dear Dr. Enrike Richardson:    Thank you for referring Dominguez Zapata to me for evaluation. Attached you will find relevant portions of my assessment and plan of care.    If you have questions, please do not hesitate to call me. I look forward to following Dominguez Zapata along with you.    Sincerely,    Kaiser June MD    Enclosure  CC:  No Recipients    If you would like to receive this communication electronically, please contact [email or web address] or [phone] to request more information on [employer] Link access.    For providers and/or their staff who would like to refer a patient to [facility], please contact us through our one-stop-shop provider referral line, [facility], at [phone].    If you feel you have received this communication in error or would no longer like to receive these types of communications, please e-mail [email or web address]
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
## 2018-06-14 NOTE — ASSESSMENT & PLAN NOTE
On appropriate medications and managed by PCP. We discussed the importance of managing all secondary stroke risk factors, including hypertension.

## 2018-06-20 ENCOUNTER — PATIENT MESSAGE (OUTPATIENT)
Dept: INTERNAL MEDICINE | Facility: CLINIC | Age: 63
End: 2018-06-20

## 2018-06-20 DIAGNOSIS — Z11.3 SCREEN FOR STD (SEXUALLY TRANSMITTED DISEASE): Primary | ICD-10-CM

## 2018-06-21 ENCOUNTER — LAB VISIT (OUTPATIENT)
Dept: LAB | Facility: HOSPITAL | Age: 63
End: 2018-06-21
Attending: FAMILY MEDICINE
Payer: COMMERCIAL

## 2018-06-21 DIAGNOSIS — Z11.3 SCREEN FOR STD (SEXUALLY TRANSMITTED DISEASE): ICD-10-CM

## 2018-06-21 PROCEDURE — 86592 SYPHILIS TEST NON-TREP QUAL: CPT

## 2018-06-21 PROCEDURE — 86694 HERPES SIMPLEX NES ANTBDY: CPT | Mod: 59

## 2018-06-21 PROCEDURE — 86694 HERPES SIMPLEX NES ANTBDY: CPT

## 2018-06-21 PROCEDURE — 80074 ACUTE HEPATITIS PANEL: CPT

## 2018-06-21 PROCEDURE — 86696 HERPES SIMPLEX TYPE 2 TEST: CPT

## 2018-06-21 PROCEDURE — 86703 HIV-1/HIV-2 1 RESULT ANTBDY: CPT

## 2018-06-22 LAB
HAV IGM SERPL QL IA: NEGATIVE
HBV CORE IGM SERPL QL IA: NEGATIVE
HBV SURFACE AG SERPL QL IA: NEGATIVE
HCV AB SERPL QL IA: NEGATIVE
HIV 1+2 AB+HIV1 P24 AG SERPL QL IA: NEGATIVE
HSV AB, IGM BY EIA: REACTIVE
HSV1 IGG SERPL QL IA: NEGATIVE
HSV2 IGG SERPL QL IA: POSITIVE
RPR SER QL: NORMAL

## 2018-06-24 ENCOUNTER — PATIENT MESSAGE (OUTPATIENT)
Dept: INTERNAL MEDICINE | Facility: CLINIC | Age: 63
End: 2018-06-24

## 2018-06-24 DIAGNOSIS — B00.9 HSV-2 INFECTION: ICD-10-CM

## 2018-06-24 LAB — HSV AB, IGM BY IFA: NEGATIVE

## 2018-06-24 RX ORDER — VALACYCLOVIR HYDROCHLORIDE 1 G/1
1000 TABLET, FILM COATED ORAL DAILY
Qty: 30 TABLET | Refills: 11 | Status: SHIPPED | OUTPATIENT
Start: 2018-06-24 | End: 2018-08-17

## 2018-06-25 ENCOUNTER — OFFICE VISIT (OUTPATIENT)
Dept: INTERNAL MEDICINE | Facility: CLINIC | Age: 63
End: 2018-06-25
Payer: COMMERCIAL

## 2018-06-25 VITALS
TEMPERATURE: 99 F | RESPIRATION RATE: 18 BRPM | HEART RATE: 62 BPM | BODY MASS INDEX: 28.2 KG/M2 | WEIGHT: 212.75 LBS | SYSTOLIC BLOOD PRESSURE: 138 MMHG | OXYGEN SATURATION: 93 % | DIASTOLIC BLOOD PRESSURE: 82 MMHG | HEIGHT: 73 IN

## 2018-06-25 DIAGNOSIS — J98.6 HEMIDIAPHRAGM PARALYSIS: ICD-10-CM

## 2018-06-25 DIAGNOSIS — J45.20 MILD INTERMITTENT ASTHMA WITHOUT COMPLICATION: ICD-10-CM

## 2018-06-25 DIAGNOSIS — Z71.84 TRAVEL ADVICE ENCOUNTER: ICD-10-CM

## 2018-06-25 DIAGNOSIS — Z20.2 STD EXPOSURE: Primary | ICD-10-CM

## 2018-06-25 PROCEDURE — 99999 PR PBB SHADOW E&M-EST. PATIENT-LVL IV: CPT | Mod: PBBFAC,,, | Performed by: FAMILY MEDICINE

## 2018-06-25 PROCEDURE — 3008F BODY MASS INDEX DOCD: CPT | Mod: CPTII,S$GLB,, | Performed by: FAMILY MEDICINE

## 2018-06-25 PROCEDURE — 3075F SYST BP GE 130 - 139MM HG: CPT | Mod: CPTII,S$GLB,, | Performed by: FAMILY MEDICINE

## 2018-06-25 PROCEDURE — 3079F DIAST BP 80-89 MM HG: CPT | Mod: CPTII,S$GLB,, | Performed by: FAMILY MEDICINE

## 2018-06-25 PROCEDURE — 99214 OFFICE O/P EST MOD 30 MIN: CPT | Mod: S$GLB,,, | Performed by: FAMILY MEDICINE

## 2018-06-25 NOTE — PROGRESS NOTES
Subjective:       Patient ID: Dominguez Zapata is a 62 y.o. male.    Chief Complaint: Advice Only (about lab results and possible pulmonary fuction testing) and Immunizations (pt is planning to go to [place] in 09\2018, needs to know about immunizations for trip)    HPI 62-year-old white male presents to clinic today for follow-up of lab results and also for travel advice.  He has recently been exposed to genital herpes.  Lab testing has been performed and shows the patient is HSV 2 positive.  Treatment options have been discussed and valacyclovir has been prescribed for further treatment.  Secondarily the patient is interested in travel advice for travel to [place].  Finally, the patient is noted to have diaphragmatic paralysis.  He is interested in further pulmonary function test for re-evaluation prior to his trip to [place].  Review of Systems   Constitutional: Positive for activity change. Negative for appetite change, chills, fatigue, fever and unexpected weight change.   HENT: Negative for congestion, ear pain, hearing loss, postnasal drip, rhinorrhea, sinus pressure, sore throat, tinnitus and trouble swallowing.    Eyes: Negative for discharge, redness, itching and visual disturbance.   Respiratory: Negative for cough, chest tightness, shortness of breath and wheezing.    Cardiovascular: Negative for chest pain and palpitations.   Gastrointestinal: Negative for abdominal pain, blood in stool, constipation, diarrhea, nausea and vomiting.   Endocrine: Negative for polydipsia and polyuria.   Genitourinary: Negative for decreased urine volume, difficulty urinating, dysuria, frequency, hematuria and urgency.   Musculoskeletal: Negative for arthralgias, back pain, joint swelling, myalgias, neck pain and neck stiffness.   Skin: Negative for rash.   Neurological: Negative for dizziness, weakness, light-headedness and headaches.   Psychiatric/Behavioral: Negative.  Negative for confusion and dysphoric mood.        Objective:      Physical Exam   Constitutional: He is oriented to person, place, and time. He appears well-developed and well-nourished. No distress.   HENT:   Head: Normocephalic and atraumatic.   Right Ear: External ear normal.   Left Ear: External ear normal.   Nose: Nose normal.   Mouth/Throat: Oropharynx is clear and moist. No oropharyngeal exudate.   Eyes: Conjunctivae and EOM are normal. Pupils are equal, round, and reactive to light. Right eye exhibits no discharge. Left eye exhibits no discharge. No scleral icterus.   Neck: Normal range of motion. Neck supple. No JVD present. No tracheal deviation present. No thyromegaly present.   Cardiovascular: Normal rate, regular rhythm, normal heart sounds and intact distal pulses.  Exam reveals no gallop and no friction rub.    No murmur heard.  Pulmonary/Chest: Effort normal and breath sounds normal. No stridor. No respiratory distress. He has no wheezes. He has no rales.   Abdominal: Soft. Bowel sounds are normal. He exhibits no distension and no mass. There is no tenderness. There is no rebound and no guarding.   Musculoskeletal: Normal range of motion. He exhibits no edema or tenderness.   Lymphadenopathy:     He has no cervical adenopathy.   Neurological: He is alert and oriented to person, place, and time.   Skin: Skin is warm and dry. No rash noted. He is not diaphoretic. No erythema. No pallor.   Psychiatric: He has a normal mood and affect. His behavior is normal. Judgment and thought content normal.   Nursing note and vitals reviewed.      Assessment:       1. STD exposure    2. Travel advice encounter    3. Hemidiaphragm paralysis    4. Mild intermittent asthma without complication        Plan:       1.  Valacyclovir 1000 mg daily for suppressive therapy.  2.  Referred to travel clinic for further travel advice for travel to [place].  3.  Complete PFTs for further evaluation of hemidiaphragmatic paralysis.  4.  Return to clinic as needed if symptoms persist  or worsen.

## 2018-06-27 DIAGNOSIS — G47.00 INSOMNIA, UNSPECIFIED TYPE: ICD-10-CM

## 2018-06-27 RX ORDER — TRAZODONE HYDROCHLORIDE 50 MG/1
TABLET ORAL
Qty: 60 TABLET | Refills: 0 | Status: SHIPPED | OUTPATIENT
Start: 2018-06-27 | End: 2018-08-15 | Stop reason: SDUPTHER

## 2018-06-29 ENCOUNTER — HOSPITAL ENCOUNTER (OUTPATIENT)
Dept: PULMONOLOGY | Facility: CLINIC | Age: 63
Discharge: HOME | End: 2018-06-29
Payer: COMMERCIAL

## 2018-06-29 DIAGNOSIS — R06.02 SHORTNESS OF BREATH: ICD-10-CM

## 2018-06-29 DIAGNOSIS — J45.20 MILD INTERMITTENT ASTHMA WITHOUT COMPLICATION: ICD-10-CM

## 2018-06-29 DIAGNOSIS — J98.6 ELEVATED DIAPHRAGM: ICD-10-CM

## 2018-06-29 DIAGNOSIS — J98.6 HEMIDIAPHRAGM PARALYSIS: ICD-10-CM

## 2018-06-29 LAB
PRE FEV1 FVC: 72
PRE FEV1: 2.85
PRE FVC: 3.98
PREDICTED FEV1 FVC: 78
PREDICTED FEV1: 4.16
PREDICTED FVC: 5.19

## 2018-06-29 PROCEDURE — 94010 BREATHING CAPACITY TEST: CPT | Mod: S$GLB,,, | Performed by: INTERNAL MEDICINE

## 2018-06-29 PROCEDURE — 94729 DIFFUSING CAPACITY: CPT | Mod: S$GLB,,, | Performed by: INTERNAL MEDICINE

## 2018-08-08 ENCOUNTER — LAB VISIT (OUTPATIENT)
Dept: LAB | Facility: HOSPITAL | Age: 63
End: 2018-08-08
Attending: UROLOGY
Payer: COMMERCIAL

## 2018-08-08 DIAGNOSIS — R97.20 ELEVATED PSA: ICD-10-CM

## 2018-08-08 LAB
PROSTATE SPECIFIC ANTIGEN, TOTAL: 8.7 NG/ML
PSA FREE MFR SERPL: 18.51 %
PSA FREE SERPL-MCNC: 1.61 NG/ML

## 2018-08-08 PROCEDURE — 36415 COLL VENOUS BLD VENIPUNCTURE: CPT | Mod: PO

## 2018-08-08 PROCEDURE — 84154 ASSAY OF PSA FREE: CPT

## 2018-08-13 ENCOUNTER — PATIENT MESSAGE (OUTPATIENT)
Dept: INTERNAL MEDICINE | Facility: CLINIC | Age: 63
End: 2018-08-13

## 2018-08-14 ENCOUNTER — HOSPITAL ENCOUNTER (OUTPATIENT)
Dept: RADIOLOGY | Facility: HOSPITAL | Age: 63
Discharge: HOME | End: 2018-08-14
Attending: PSYCHIATRY & NEUROLOGY
Payer: COMMERCIAL

## 2018-08-14 ENCOUNTER — PROCEDURE VISIT (OUTPATIENT)
Dept: NEUROLOGY | Facility: CLINIC | Age: 63
End: 2018-08-14
Payer: COMMERCIAL

## 2018-08-14 DIAGNOSIS — J98.6 HEMIDIAPHRAGM PARALYSIS: ICD-10-CM

## 2018-08-14 PROCEDURE — 72141 MRI NECK SPINE W/O DYE: CPT | Mod: TC

## 2018-08-14 PROCEDURE — 95885 MUSC TST DONE W/NERV TST LIM: CPT | Mod: S$GLB,,, | Performed by: PSYCHIATRY & NEUROLOGY

## 2018-08-14 PROCEDURE — 95908 NRV CNDJ TST 3-4 STUDIES: CPT | Mod: S$GLB,,, | Performed by: PSYCHIATRY & NEUROLOGY

## 2018-08-14 PROCEDURE — 72141 MRI NECK SPINE W/O DYE: CPT | Mod: 26,,, | Performed by: RADIOLOGY

## 2018-08-15 DIAGNOSIS — G47.00 INSOMNIA, UNSPECIFIED TYPE: ICD-10-CM

## 2018-08-15 RX ORDER — TRAZODONE HYDROCHLORIDE 50 MG/1
TABLET ORAL
Qty: 60 TABLET | Refills: 0 | Status: SHIPPED | OUTPATIENT
Start: 2018-08-15 | End: 2018-11-14 | Stop reason: SDUPTHER

## 2018-08-16 ENCOUNTER — PATIENT MESSAGE (OUTPATIENT)
Dept: NEUROLOGY | Facility: CLINIC | Age: 63
End: 2018-08-16

## 2018-08-17 ENCOUNTER — OFFICE VISIT (OUTPATIENT)
Dept: UROLOGY | Facility: CLINIC | Age: 63
End: 2018-08-17
Payer: COMMERCIAL

## 2018-08-17 VITALS
WEIGHT: 199.31 LBS | DIASTOLIC BLOOD PRESSURE: 75 MMHG | HEIGHT: 73 IN | BODY MASS INDEX: 26.42 KG/M2 | HEART RATE: 64 BPM | SYSTOLIC BLOOD PRESSURE: 122 MMHG

## 2018-08-17 DIAGNOSIS — N40.1 BENIGN NON-NODULAR PROSTATIC HYPERPLASIA WITH LOWER URINARY TRACT SYMPTOMS: ICD-10-CM

## 2018-08-17 DIAGNOSIS — R97.20 ELEVATED PSA: Primary | ICD-10-CM

## 2018-08-17 DIAGNOSIS — E29.1 HYPOGONADISM MALE: ICD-10-CM

## 2018-08-17 DIAGNOSIS — R35.1 NOCTURIA: ICD-10-CM

## 2018-08-17 LAB
BILIRUB SERPL-MCNC: NORMAL MG/DL
BLOOD URINE, POC: NORMAL
COLOR, POC UA: YELLOW
GLUCOSE UR QL STRIP: NORMAL
KETONES UR QL STRIP: NORMAL
LEUKOCYTE ESTERASE URINE, POC: NORMAL
NITRITE, POC UA: NORMAL
PH, POC UA: 5
PROTEIN, POC: NORMAL
SPECIFIC GRAVITY, POC UA: 1.02
UROBILINOGEN, POC UA: NORMAL

## 2018-08-17 PROCEDURE — 3074F SYST BP LT 130 MM HG: CPT | Mod: CPTII,S$GLB,, | Performed by: UROLOGY

## 2018-08-17 PROCEDURE — 3078F DIAST BP <80 MM HG: CPT | Mod: CPTII,S$GLB,, | Performed by: UROLOGY

## 2018-08-17 PROCEDURE — 81002 URINALYSIS NONAUTO W/O SCOPE: CPT | Mod: S$GLB,,, | Performed by: UROLOGY

## 2018-08-17 PROCEDURE — 3008F BODY MASS INDEX DOCD: CPT | Mod: CPTII,S$GLB,, | Performed by: UROLOGY

## 2018-08-17 PROCEDURE — 99999 PR PBB SHADOW E&M-EST. PATIENT-LVL III: CPT | Mod: PBBFAC,,, | Performed by: UROLOGY

## 2018-08-17 PROCEDURE — 99214 OFFICE O/P EST MOD 30 MIN: CPT | Mod: 25,S$GLB,, | Performed by: UROLOGY

## 2018-08-20 ENCOUNTER — PROCEDURE VISIT (OUTPATIENT)
Dept: UROLOGY | Facility: CLINIC | Age: 63
End: 2018-08-20
Attending: UROLOGY
Payer: COMMERCIAL

## 2018-08-20 VITALS
HEART RATE: 72 BPM | SYSTOLIC BLOOD PRESSURE: 150 MMHG | HEIGHT: 73 IN | BODY MASS INDEX: 26.47 KG/M2 | DIASTOLIC BLOOD PRESSURE: 86 MMHG | WEIGHT: 199.75 LBS

## 2018-08-20 DIAGNOSIS — N40.1 BENIGN NON-NODULAR PROSTATIC HYPERPLASIA WITH LOWER URINARY TRACT SYMPTOMS: Primary | ICD-10-CM

## 2018-08-20 DIAGNOSIS — R35.1 NOCTURIA: ICD-10-CM

## 2018-08-20 LAB
BILIRUB SERPL-MCNC: ABNORMAL MG/DL
BLOOD URINE, POC: ABNORMAL
COLOR, POC UA: ABNORMAL
GLUCOSE UR QL STRIP: NORMAL
KETONES UR QL STRIP: ABNORMAL
LEUKOCYTE ESTERASE URINE, POC: ABNORMAL
NITRITE, POC UA: ABNORMAL
PH, POC UA: 5
PROTEIN, POC: ABNORMAL
SPECIFIC GRAVITY, POC UA: 1.02
UROBILINOGEN, POC UA: NORMAL

## 2018-08-20 PROCEDURE — 81002 URINALYSIS NONAUTO W/O SCOPE: CPT | Mod: S$GLB,,, | Performed by: UROLOGY

## 2018-08-20 PROCEDURE — 87086 URINE CULTURE/COLONY COUNT: CPT

## 2018-08-20 PROCEDURE — 52000 CYSTOURETHROSCOPY: CPT | Mod: S$GLB,,, | Performed by: UROLOGY

## 2018-08-20 RX ORDER — LIDOCAINE HYDROCHLORIDE 20 MG/ML
JELLY TOPICAL
Status: COMPLETED | OUTPATIENT
Start: 2018-08-20 | End: 2018-08-20

## 2018-08-20 RX ORDER — CIPROFLOXACIN 2 MG/ML
400 INJECTION, SOLUTION INTRAVENOUS
Status: CANCELLED | OUTPATIENT
Start: 2018-08-20

## 2018-08-20 RX ORDER — CIPROFLOXACIN 500 MG/1
500 TABLET ORAL
Status: COMPLETED | OUTPATIENT
Start: 2018-08-20 | End: 2018-08-20

## 2018-08-20 RX ADMIN — LIDOCAINE HYDROCHLORIDE 5 ML: 20 JELLY TOPICAL at 01:08

## 2018-08-20 RX ADMIN — CIPROFLOXACIN 500 MG: 500 TABLET ORAL at 01:08

## 2018-08-20 NOTE — PROCEDURES
Cystoscopy  Date/Time: 8/20/2018 1:52 PM  Performed by: Levon Corona MD  Authorized by: Levon Corona MD     Consent Done?:  Yes (Written)  Indications: BPH    Position:  Supine  Anesthesia:  Lidocaine jelly  Preparation: Patient was prepped and draped in usual sterile fashion      Scope type:  Flexible cystoscope  External exam normal: Yes    Urethra normal: Yes    Prostate normal: No (No intravesical or obstructing median lobe. Previous implants not visible.)          Hyperplasia (Bilobar)Bladder neck normal: Bladder neck normal   Bladder normal: Yes      Patient tolerance:  Patient tolerated the procedure well with no immediate complications     Impression:  BPH with POSEY    Plan:  Proceed with Urolift

## 2018-08-20 NOTE — H&P
"Subjective:      Dominguez Zapata is a 63 y.o. male with BPH and LUTS.    He was treated w/ Urolift at [facility] in 2014. More recently he has been bothered by nocturia and started nightly ditropan last year. Further bother reported now with AUASS 12/4. Interested in additional treatment for further relief.     Cysto demonstrated repeat obstruction from lateral lobe hypertrophy without median lobe obstruction.    He also has h/o elevated PSA, s/p biopsy in [place] in 2009 (negative) and MRI in 2015 (low probability of tumor).  His highest known PSA was 14.6 in August 2017.    The following portions of the patient's history were reviewed and updated as appropriate: allergies, current medications, past family history, past medical history, past social history, past surgical history and problem list.    Review of Systems  A comprehensive multipoint review of systems was negative except as otherwise stated in the HPI.     Objective:   Vitals: BP (!) 150/86 (BP Location: Left arm, Patient Position: Sitting, BP Method: Medium (Automatic))   Pulse 72   Ht 6' 1" (1.854 m)   Wt 90.6 kg (199 lb 11.8 oz)   BMI 26.35 kg/m²     Physical Exam   General: alert and oriented, no acute distress  Respiratory: Symmetric expansion, non-labored breathing  Neuro: no gross deficits  Psych: normal judgment and insight, normal mood/affect and non-anxious    Lab Review   Urinalysis demonstrates negative for all components  Lab Results   Component Value Date    WBC 8.11 01/19/2018    HGB 17.2 01/19/2018    HCT 52.2 01/19/2018    MCV 88 01/19/2018     01/19/2018     Lab Results   Component Value Date    CREATININE 1.3 03/08/2018    BUN 12 03/08/2018     Component PSA Total PSA, Free PSA, Free Pct   Latest Ref Rng & Units 0.00 - 4.00 ng/mL 0.01 - 1.50 ng/mL Not established %   8/8/2018 8.7 (H) 1.61 (H) 18.51   2/1/2018 10.8 (H)     1/19/2018 15.4 (H)     8/8/2017 11.1 (H) 2.46 (H) 22.16   8/2/2017 14.6 (H) 2.61 (H) 17.88   1/10/2017 4.8 " (H)     7/6/2016 6.8 (H)         Component Testosterone, Total   Latest Ref Rng & Units 195.0 - 1138.0 ng/dL   1/19/2018 1099 (day 5 of cycle)   5/2/2017 446   4/13/2017 657   3/16/2017 1078       Assessment and Plan:   1. BPH  -- Continue ditropan qhs  -- Wishes to proceed with Urolift to relieve obstruction

## 2018-08-21 LAB — BACTERIA UR CULT: NO GROWTH

## 2018-08-28 ENCOUNTER — PATIENT MESSAGE (OUTPATIENT)
Dept: NEUROLOGY | Facility: CLINIC | Age: 63
End: 2018-08-28

## 2018-08-28 ENCOUNTER — HOSPITAL ENCOUNTER (OUTPATIENT)
Dept: PREADMISSION TESTING | Facility: OTHER | Age: 63
Discharge: HOME | End: 2018-08-28
Attending: UROLOGY
Payer: COMMERCIAL

## 2018-08-28 VITALS
HEIGHT: 73 IN | DIASTOLIC BLOOD PRESSURE: 68 MMHG | WEIGHT: 200 LBS | OXYGEN SATURATION: 98 % | HEART RATE: 60 BPM | TEMPERATURE: 98 F | SYSTOLIC BLOOD PRESSURE: 109 MMHG | BODY MASS INDEX: 26.51 KG/M2

## 2018-08-28 DIAGNOSIS — J98.6 HEMIDIAPHRAGM PARALYSIS: Primary | ICD-10-CM

## 2018-08-28 RX ORDER — SODIUM CHLORIDE, SODIUM LACTATE, POTASSIUM CHLORIDE, CALCIUM CHLORIDE 600; 310; 30; 20 MG/100ML; MG/100ML; MG/100ML; MG/100ML
INJECTION, SOLUTION INTRAVENOUS CONTINUOUS
Status: CANCELLED | OUTPATIENT
Start: 2018-08-28

## 2018-08-28 NOTE — DISCHARGE INSTRUCTIONS
PRE-ADMIT TESTING -  [phone]    [address]  [place] [facility]          Your surgery has been scheduled at [facility]. We are pleased to have the opportunity to serve you. For Further Information please call [phone].    On the day of surgery please report to the Information Desk on the 1st floor.    · CONTACT YOUR PHYSICIAN'S OFFICE THE DAY PRIOR TO YOUR SURGERY TO OBTAIN YOUR ARRIVAL TIME.     · The evening before surgery do not eat anything after 9 p.m. ( this includes hard candy, chewing gum and mints).  You may only have GATORADE, POWERADE AND WATER  from 9 p.m. until you leave your home.   DO NOT DRINK ANY LIQUIDS ON THE WAY TO THE HOSPITAL.      SPECIAL MEDICATION INSTRUCTIONS: TAKE medications checked off by the Anesthesiologist on your Medication List.    Angiogram Patients: Take medications as instructed by your physician, including aspirin.     Surgery Patients:    If you take ASPIRIN - Your PHYSICIAN/SURGEON will need to inform you IF/OR when you need to stop taking aspirin prior to your surgery.     Do Not take any medications containing IBUPROFEN.  Do Not Wear any make-up or dark nail polish   (especially eye make-up) to surgery. If you come to surgery with makeup on you will be required to remove the makeup or nail polish.    Do not shave your surgical area at least 5 days prior to your surgery. The surgical prep will be performed at the hospital according to Infection Control regulations.    Leave all valuables at home.   Do Not wear any jewelry or watches, including any metal in body piercings.  Contact Lens must be removed before surgery. Either do not wear the contact lens or bring a case and solution for storage.  Please bring a container for eyeglasses or dentures as required.  Bring any paperwork your physician has provided, such as consent forms,  history and physicals, doctor's orders, etc.   Bring comfortable clothes that are loose fitting to wear upon  discharge. Take into consideration the type of surgery being performed.  Maintain your diet as advised per your physician the day prior to surgery.      Adequate rest the night before surgery is advised.   Park in the Parking lot behind the hospital or in the [facility] across the street from the parking lot. Parking is complimentary.  If you will be discharged the same day as your procedure, please arrange for a responsible adult to drive you home or to accompany you if traveling by taxi.   YOU WILL NOT BE PERMITTED TO DRIVE OR TO LEAVE THE HOSPITAL ALONE AFTER SURGERY.   It is strongly recommended that you arrange for someone to remain with you for the first 24 hrs following your surgery.       Thank you for your cooperation.  The Staff of [facility].        Bathing Instructions                                                                 Please shower the evening before and morning of your procedure with    ANTIBACTERIAL SOAP. ( DIAL, etc )  Concentrate on the surgical area   for at least 3 minutes and rinse completely. Dry off as usual.   Do not use any deodorant, powder, body lotions, perfume, after shave or    cologne.

## 2018-08-30 DIAGNOSIS — F90.2 ATTENTION DEFICIT HYPERACTIVITY DISORDER (ADHD), COMBINED TYPE: ICD-10-CM

## 2018-08-30 RX ORDER — GUANFACINE 1 MG/1
1 TABLET, EXTENDED RELEASE ORAL DAILY
Qty: 30 TABLET | Refills: 2 | OUTPATIENT
Start: 2018-08-30

## 2018-08-30 RX ORDER — GUANFACINE 2 MG/1
1 TABLET, EXTENDED RELEASE ORAL DAILY
Qty: 30 TABLET | Refills: 5 | OUTPATIENT
Start: 2018-08-30

## 2018-09-02 ENCOUNTER — NURSE TRIAGE (OUTPATIENT)
Dept: ADMINISTRATIVE | Facility: CLINIC | Age: 63
End: 2018-09-02

## 2018-09-02 NOTE — TELEPHONE ENCOUNTER
"  Reason for Disposition   [1] Blurred vision or visual changes AND [2] present now AND [3] sudden onset or new (e.g., minutes, hours, days)  (Exception: previously diagnosed migraine headaches with same symptoms)    Answer Assessment - Initial Assessment Questions  1. DESCRIPTION: "What is the vision loss like? Describe it for me." (e.g., complete vision loss, blurred vision, double vision, floaters, etc.)     Cloudy, floaters in eye, now eye 20% gray rather than black and eye   2. LOCATION: "One or both eyes?" If one, ask: "Which eye?"     L eye   3. SEVERITY: "Can you see anything?" If so, ask: "What can you see?" (e.g., fine print)     Yes   4. ONSET: "When did this begin?" "Did it start suddenly or has this been gradual?"      Couple hours   5. PATTERN: "Does this come and go, or has it been constant since it started?"     Constant   6. PAIN: "Is there any pain in your eye(s)?"  (Scale 1-10; or mild, moderate, severe)     No   7. CONTACTS-GLASSES: "Do you wear contacts or glasses?"     Yes   8. CAUSE: "What do you think is causing this visual problem?"      Retina   9. OTHER SYMPTOMS: "Do you have any other symptoms?" (e.g., headache, arm or leg weakness)     3/2015 saw dr yang - fluid in eye shrinks    Protocols used: ST VISION LOSS OR CHANGE-A-[facility] ED due to cloudy vision. Call back with questions   "

## 2018-09-04 ENCOUNTER — OFFICE VISIT (OUTPATIENT)
Dept: OPHTHALMOLOGY | Facility: CLINIC | Age: 63
End: 2018-09-04
Payer: COMMERCIAL

## 2018-09-04 VITALS — HEART RATE: 68 BPM | SYSTOLIC BLOOD PRESSURE: 143 MMHG | DIASTOLIC BLOOD PRESSURE: 93 MMHG

## 2018-09-04 DIAGNOSIS — H33.312 RETINAL TEAR, LEFT: ICD-10-CM

## 2018-09-04 DIAGNOSIS — H43.812 POSTERIOR VITREOUS DETACHMENT, LEFT: ICD-10-CM

## 2018-09-04 DIAGNOSIS — H43.12 VITREOUS HEMORRHAGE, LEFT: ICD-10-CM

## 2018-09-04 PROCEDURE — 92004 COMPRE OPH EXAM NEW PT 1/>: CPT | Mod: 57,S$GLB,, | Performed by: OPHTHALMOLOGY

## 2018-09-04 PROCEDURE — 67145 PROPH RTA DTCHMNT PC: CPT | Mod: LT,S$GLB,, | Performed by: OPHTHALMOLOGY

## 2018-09-04 PROCEDURE — 99999 PR PBB SHADOW E&M-EST. PATIENT-LVL V: CPT | Mod: PBBFAC,,, | Performed by: OPHTHALMOLOGY

## 2018-09-04 RX ORDER — GUANFACINE 2 MG/1
1 TABLET, EXTENDED RELEASE ORAL DAILY
Qty: 30 TABLET | Refills: 5 | OUTPATIENT
Start: 2018-09-04

## 2018-09-04 RX ORDER — VALACYCLOVIR HYDROCHLORIDE 1 G/1
1000 TABLET, FILM COATED ORAL DAILY
COMMUNITY
End: 2019-07-31 | Stop reason: SDUPTHER

## 2018-09-04 NOTE — PROGRESS NOTES
HPI     Flashes and decline in va   DLS- 06/16/2015 Dr Messer     Really bad floater on Saturday broke up at the end of the day OS. OS noticed decline in va and a bunch od small floaters in OD. The next day noticed a ring in central va does not move along with the floaters. Denies pain. Colors are dim, blacks are not as dark and whites look dingy.       A/P    1. PVD OS  Small VH OS today    2. Vitreoretinal traction with elevation  S/p laser retinopexy OS 3/15  9/18 - increased traction with vessel propagating partially through laser    Additional retinopexy OS today    3. FLoaters OU    4. Early NS      3 months    Risks, benefits, and alternatives to treatment discussed in detail with the patient.  The patient voiced understanding and wished to proceed with the procedure    Laser Procedure Note  Dx: Retinal break OS  Laser: Retinopexy OS  Argon  Spot: 200  Power: 150-250  Dur: 0.1  #:   301  Complications: None  F/U as above

## 2018-09-19 ENCOUNTER — PATIENT MESSAGE (OUTPATIENT)
Dept: ENDOSCOPY | Facility: HOSPITAL | Age: 63
End: 2018-09-19

## 2018-11-14 DIAGNOSIS — G47.00 INSOMNIA, UNSPECIFIED TYPE: ICD-10-CM

## 2018-11-14 RX ORDER — TRAZODONE HYDROCHLORIDE 50 MG/1
TABLET ORAL
Qty: 60 TABLET | Refills: 0 | Status: SHIPPED | OUTPATIENT
Start: 2018-11-14 | End: 2018-11-28 | Stop reason: SDUPTHER

## 2018-11-16 RX ORDER — DOXYCYCLINE HYCLATE 100 MG
100 TABLET ORAL DAILY
Qty: 30 TABLET | Refills: 5 | OUTPATIENT
Start: 2018-11-16

## 2018-11-25 DIAGNOSIS — F90.2 ATTENTION DEFICIT HYPERACTIVITY DISORDER (ADHD), COMBINED TYPE: ICD-10-CM

## 2018-11-27 RX ORDER — GUANFACINE 1 MG/1
1 TABLET, EXTENDED RELEASE ORAL DAILY
Qty: 30 TABLET | Refills: 2 | OUTPATIENT
Start: 2018-11-27

## 2018-11-28 ENCOUNTER — PATIENT MESSAGE (OUTPATIENT)
Dept: INTERNAL MEDICINE | Facility: CLINIC | Age: 63
End: 2018-11-28

## 2018-11-28 ENCOUNTER — PATIENT MESSAGE (OUTPATIENT)
Dept: UROLOGY | Facility: CLINIC | Age: 63
End: 2018-11-28

## 2018-11-28 DIAGNOSIS — E04.2 MULTIPLE THYROID NODULES: ICD-10-CM

## 2018-11-28 DIAGNOSIS — N40.1 BENIGN NON-NODULAR PROSTATIC HYPERPLASIA WITH LOWER URINARY TRACT SYMPTOMS: ICD-10-CM

## 2018-11-28 DIAGNOSIS — E01.0 THYROMEGALY: Primary | ICD-10-CM

## 2018-11-28 DIAGNOSIS — G47.00 INSOMNIA, UNSPECIFIED TYPE: ICD-10-CM

## 2018-11-28 RX ORDER — TADALAFIL 5 MG/1
5 TABLET ORAL DAILY
Qty: 30 TABLET | Refills: 11 | Status: SHIPPED | OUTPATIENT
Start: 2018-11-28 | End: 2019-09-06 | Stop reason: SDUPTHER

## 2018-11-28 RX ORDER — TRAZODONE HYDROCHLORIDE 50 MG/1
TABLET ORAL
Qty: 60 TABLET | Refills: 0 | Status: SHIPPED | OUTPATIENT
Start: 2018-11-28 | End: 2019-01-14 | Stop reason: SDUPTHER

## 2018-11-28 RX ORDER — TRAZODONE HYDROCHLORIDE 50 MG/1
TABLET ORAL
Qty: 60 TABLET | Refills: 0 | Status: SHIPPED | OUTPATIENT
Start: 2018-11-28 | End: 2019-03-02 | Stop reason: SDUPTHER

## 2018-11-28 RX ORDER — OXYBUTYNIN CHLORIDE 5 MG/1
5 TABLET ORAL NIGHTLY
Qty: 90 TABLET | Refills: 3 | Status: SHIPPED | OUTPATIENT
Start: 2018-11-28 | End: 2019-09-06 | Stop reason: SDUPTHER

## 2018-11-28 RX ORDER — DOXYCYCLINE HYCLATE 100 MG
100 TABLET ORAL DAILY
Qty: 30 TABLET | Refills: 11 | Status: SHIPPED | OUTPATIENT
Start: 2018-11-28 | End: 2019-11-15 | Stop reason: SDUPTHER

## 2018-11-28 RX ORDER — TADALAFIL 5 MG/1
5 TABLET ORAL DAILY
Qty: 30 TABLET | Refills: 11 | Status: SHIPPED | OUTPATIENT
Start: 2018-11-28 | End: 2019-01-14 | Stop reason: SDUPTHER

## 2018-11-28 RX ORDER — METFORMIN HYDROCHLORIDE 500 MG/1
500 TABLET ORAL
Qty: 240 TABLET | Refills: 1 | Status: SHIPPED | OUTPATIENT
Start: 2018-11-28 | End: 2019-02-20

## 2018-11-28 RX ORDER — OXYBUTYNIN CHLORIDE 5 MG/1
5 TABLET ORAL NIGHTLY
Qty: 90 TABLET | Refills: 3 | Status: SHIPPED | OUTPATIENT
Start: 2018-11-28 | End: 2019-01-14 | Stop reason: SDUPTHER

## 2018-11-28 NOTE — TELEPHONE ENCOUNTER
Dominguez Zapata would like a refill of the following medications:         traZODone (DESYREL) 50 MG tablet [Meghana Cid MD]     Preferred pharmacy: [facility]ACY #[id number] - [place] [address]   Delivery method: Pickup

## 2018-11-28 NOTE — TELEPHONE ENCOUNTER
Please schedule a thyroid ultrasound for further evaluation of enlarged thyroid and thyroid nodules noted on MRI.  Thank you.

## 2018-11-30 ENCOUNTER — HOSPITAL ENCOUNTER (OUTPATIENT)
Dept: RADIOLOGY | Facility: HOSPITAL | Age: 63
Discharge: HOME | End: 2018-11-30
Attending: FAMILY MEDICINE
Payer: COMMERCIAL

## 2018-11-30 DIAGNOSIS — E04.2 MULTIPLE THYROID NODULES: ICD-10-CM

## 2018-11-30 DIAGNOSIS — E01.0 THYROMEGALY: ICD-10-CM

## 2018-11-30 PROCEDURE — 76536 US EXAM OF HEAD AND NECK: CPT | Mod: TC

## 2018-11-30 PROCEDURE — 76536 US EXAM OF HEAD AND NECK: CPT | Mod: 26,,, | Performed by: RADIOLOGY

## 2018-12-02 ENCOUNTER — PATIENT MESSAGE (OUTPATIENT)
Dept: INTERNAL MEDICINE | Facility: CLINIC | Age: 63
End: 2018-12-02

## 2018-12-02 DIAGNOSIS — E04.1 THYROID NODULE: ICD-10-CM

## 2018-12-02 NOTE — TELEPHONE ENCOUNTER
Please schedule an Endocrinology appointment for further evaluation and treatment of thyroid nodules. Thank you.

## 2018-12-04 ENCOUNTER — OFFICE VISIT (OUTPATIENT)
Dept: OPHTHALMOLOGY | Facility: CLINIC | Age: 63
End: 2018-12-04
Payer: COMMERCIAL

## 2018-12-04 DIAGNOSIS — H33.312 RETINAL TEAR, LEFT: ICD-10-CM

## 2018-12-04 DIAGNOSIS — H35.9 ABNORMALITY OF VITREORETINAL INTERFACE: ICD-10-CM

## 2018-12-04 DIAGNOSIS — H43.812 POSTERIOR VITREOUS DETACHMENT, LEFT: ICD-10-CM

## 2018-12-04 DIAGNOSIS — H43.12 VITREOUS HEMORRHAGE, LEFT: Primary | ICD-10-CM

## 2018-12-04 DIAGNOSIS — H43.9 ABNORMALITY OF VITREORETINAL INTERFACE: ICD-10-CM

## 2018-12-04 PROCEDURE — 92014 COMPRE OPH EXAM EST PT 1/>: CPT | Mod: S$GLB,,, | Performed by: OPHTHALMOLOGY

## 2018-12-04 PROCEDURE — 92226 PR SPECIAL EYE EXAM, SUBSEQUENT: CPT | Mod: LT,S$GLB,, | Performed by: OPHTHALMOLOGY

## 2018-12-04 PROCEDURE — 99999 PR PBB SHADOW E&M-EST. PATIENT-LVL II: CPT | Mod: PBBFAC,,, | Performed by: OPHTHALMOLOGY

## 2018-12-04 NOTE — PROGRESS NOTES
HPI     Flashes and decline in va   DLS- 9/4/18 Dr Messer     Really bad floater on Saturday broke up at the end of the day OS. OS noticed decline in va and a bunch od small floaters in OD. The next day noticed a ring in central va does not move along with the floaters. Denies pain.          A/P    1. PVD OS  Small VH OS today    2. Vitreoretinal traction with elevation  S/p laser retinopexy OS 3/15  9/18 - increased traction with vessel propagating partially through laser - additional retinopexy applied    3. FLoaters OU    4. Early NS      1 year

## 2018-12-05 RX ORDER — GUANFACINE 2 MG/1
1 TABLET, EXTENDED RELEASE ORAL DAILY
Qty: 30 TABLET | Refills: 5 | OUTPATIENT
Start: 2018-12-05

## 2019-01-14 ENCOUNTER — OFFICE VISIT (OUTPATIENT)
Dept: ENDOCRINOLOGY | Facility: CLINIC | Age: 64
End: 2019-01-14
Payer: COMMERCIAL

## 2019-01-14 VITALS
HEIGHT: 73 IN | WEIGHT: 210.88 LBS | HEART RATE: 64 BPM | BODY MASS INDEX: 27.95 KG/M2 | TEMPERATURE: 98 F | DIASTOLIC BLOOD PRESSURE: 96 MMHG | SYSTOLIC BLOOD PRESSURE: 146 MMHG

## 2019-01-14 DIAGNOSIS — R73.03 PREDIABETES: ICD-10-CM

## 2019-01-14 DIAGNOSIS — E05.90 SUBCLINICAL HYPERTHYROIDISM: ICD-10-CM

## 2019-01-14 DIAGNOSIS — E04.1 THYROID NODULE: Primary | ICD-10-CM

## 2019-01-14 PROCEDURE — 99999 PR PBB SHADOW E&M-EST. PATIENT-LVL III: ICD-10-PCS | Mod: PBBFAC,,, | Performed by: INTERNAL MEDICINE

## 2019-01-14 PROCEDURE — 99999 PR PBB SHADOW E&M-EST. PATIENT-LVL III: CPT | Mod: PBBFAC,,, | Performed by: INTERNAL MEDICINE

## 2019-01-14 PROCEDURE — 99244 PR OFFICE CONSULTATION,LEVEL IV: ICD-10-PCS | Mod: S$GLB,,, | Performed by: INTERNAL MEDICINE

## 2019-01-14 PROCEDURE — 99244 OFF/OP CNSLTJ NEW/EST MOD 40: CPT | Mod: S$GLB,,, | Performed by: INTERNAL MEDICINE

## 2019-01-14 RX ORDER — MODAFINIL 200 MG/1
TABLET ORAL
Refills: 5 | COMMUNITY
Start: 2018-12-26 | End: 2019-03-14 | Stop reason: SDUPTHER

## 2019-01-14 NOTE — LETTER
January 14, 2019      Dalton Ahn MD  [address] [place] [address]           [facility]  [address]  [place] [address]  Phone: [phone]          Patient: Dominguez Zapata   MR Number: [id number]   YOB: 1955   Date of Visit: 1/14/2019       Dear Dr. Dalton Ahn:    Thank you for referring Dominguez Zapata to me for evaluation. Attached you will find relevant portions of my assessment and plan of care.    If you have questions, please do not hesitate to call me. I look forward to following Dominguez Zapata along with you.    Sincerely,    Eileen Dias MD    Enclosure  CC:  No Recipients    If you would like to receive this communication electronically, please contact [email or web address] or [phone] to request more information on [employer] access.    For providers and/or their staff who would like to refer a patient to [facility], please contact us through our one-stop-shop provider referral line, [facility], at [phone].    If you feel you have received this communication in error or would no longer like to receive these types of communications, please e-mail [email or web address]

## 2019-01-14 NOTE — PROGRESS NOTES
"ENDOCRINOLOGY INITIAL VISIT    Dominguez Zapata is a 63 y.o. male referred by Dr. Dalton Ahn for evaluation of thyroid nodules.    Nodules found incidentally on MRI cervical spine 6/2018 and confirmed on US 11/2018    Thyroid US 11/30/18:  The thyroid is normal in size.  Right lobe of the thyroid measures 6.9 x 1.8 x 1.7 cm.  Left lobe of the thyroid measures 6.0 x 1.9 x 1.8 cm.  Normal vascularity.  Multinodular thyroid.    Right lobe:  1.4 x 0.8 x 1.0 cm mixed solid cystic nodule.  Approximately 1.4 x 1.1 x 1.4 cm predominantly solid isoechoic nodule with adjacent heterogeneous region and rim calcification.  Nodule meets criteria for FNA.  1.3 x 0.8 x 1.0 cm mixed solid cystic nodule with calcification.    Left lobe:  1.8 x 1.3 x 1.3 cm mixed solid cystic nodule.  0.5 x 0.5 x 0.6 cm solid isoechoic nodule.  Cervical lymph nodes demonstrate normal morphology and size.    Risk Factors for Thyroid Cancer:  Hx of External Beam Radiation:denies  Family Hx of Thyroid Cancer:denies    Denies rapid neck enlargement, difficulty swallowing, SOB, or hoarseness.     There is no known disorder of thyroid function.  Recent TSH:    Component      Latest Ref Rng & Units 1/19/2018   TSH      0.400 - 4.000 uIU/mL 0.220 (L)     Has been taking Lugol's drops for the past 2-3 years; a few "large drops" in coffee each day    Prediabetes  Also wishes to discuss pre-DM today  Last A1c 6.4%  Has been changing diet and exercising  Monitoring glucose 4-5 times a day to determine which foods cause rise    Majority of values in the 's    REVIEW OF SYSTEMS  Constitutional: Weight loss. + fatigue  Temperature: Denies heat/cold intolerance  Eyes: No blurry vision, loss of vision, diplopia.  ENT: No voice alterations, no problems swallowing.  Thyroid: No masses in neck area.  CV: elevated BP, monitoring closely  Pulm: No cough, no shortness of breath, no wheezing.  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  Skin: No skin lesions.  Endo: No polyuria/polydipsia.    MEDICATION    Current Outpatient Medications:     albuterol 90 mcg/actuation inhaler, Inhale 2 puffs into the lungs every 4 (four) hours as needed for Shortness of Breath. Rescue, Disp: 18 g, Rfl: 3    CHOLECALCIFEROL, VITAMIN D3, (VITAMIN D3 ORAL), Take 4,000 Units by mouth once daily. , Disp: , Rfl:     doxycycline (VIBRA-TABS) 100 MG tablet, Take 1 tablet (100 mg total) by mouth once daily., Disp: 30 tablet, Rfl: 11    GLUC GONZALEZ/CHONDRO GONZALEZ A/VIT C/MN (GLUCOSAMINE CHONDROITIN MAXSTR ORAL), Take 1 tablet by mouth 2 (two) times daily., Disp: , Rfl:     guanFACINE 1 mg Tb24, TAKE 1 TABLET BY MOUTH ONCE DAILY., Disp: 30 tablet, Rfl: 5    lisinopril (PRINIVIL,ZESTRIL) 40 MG tablet, Take 1 tablet (40 mg total) by mouth once daily., Disp: 30 tablet, Rfl: 11    MAGNESIUM ORAL, Take 1 tablet by mouth once daily., Disp: , Rfl:     metFORMIN (GLUCOPHAGE) 500 MG tablet, Take 1 tablet (500 mg total) by mouth daily with breakfast., Disp: 240 tablet, Rfl: 1    modafinil (PROVIGIL) 200 MG Tab, TAKE 1 TABLET BY MOUTH TWICE A DAY AS NEEDED FOR EXCESSIVE DAYTIME SLEEPINESS TAKE NO LATER THAN 2PM, Disp: , Rfl: 5    niacin 500 MG CpSR, Take 250 mg by mouth every evening., Disp: , Rfl:     oxybutynin (DITROPAN) 5 MG Tab, Take 1 tablet (5 mg total) by mouth every evening., Disp: 90 tablet, Rfl: 3    tadalafil (CIALIS) 5 MG tablet, Take 1 tablet (5 mg total) by mouth once daily., Disp: 30 tablet, Rfl: 11    testosterone cypionate (DEPOTESTOTERONE CYPIONATE) 200 mg/mL injection, INJECT 1.5 MLS (300 MG TOTAL) INTO THE MUSCLE EVERY 14 (FOURTEEN) DAYS., Disp: 10 mL, Rfl: 1    traZODone (DESYREL) 50 MG tablet, 1 pill PO PRN for insomnia at bedtime. OK to take 2nd pill in 30 minutes if still awake., Disp: 60 tablet, Rfl: 0    UBIDECARENONE (COENZYME Q10 ORAL), Take 1 tablet by mouth once daily., Disp: , Rfl:     valACYclovir (VALTREX) 1000 MG  "tablet, Take 1,000 mg by mouth once daily. , Disp: , Rfl:     VITAMIN K2 ORAL, Take 1 tablet by mouth once daily., Disp: , Rfl:     ALLERGIES  Review of patient's allergies indicates:  No Known Allergies      PHYSICAL EXAM  BP (!) 146/96 (BP Location: Right arm, Patient Position: Sitting, BP Method: Medium (Manual))   Pulse 64   Temp 97.7 °F (36.5 °C)   Ht 6' 1" (1.854 m)   Wt 95.7 kg (210 lb 13.9 oz)   BMI 27.82 kg/m²   Body mass index is 27.82 kg/m².  General Appearance:  Normal appearing, pleasant, NAD  Skin:  no rashes or lesions  HEENT:  EOMI, MMM  Neck:  No thyromegaly  Extremities:  no lower extremity edema  Neurologic:  A&O x3  Psychiatric:  normal mood and affect      ASSESSMENT/PLAN  1. Thyroid nodule    2. Subclinical hyperthyroidism    3. Prediabetes        Thyroid nodules  US with multiple nodules which could be considered for biopsy  Will evaluate hyperthyroidism as below and pending results plan for FNA of right lobe nodule with rim calcifications and dominant left nodule    Subclinical hyperthyroidism  Possible etiology includes thyroid nodules, excess iodine intake although unclear how much he is getting daily  Will repeat TSH, FT4 to confirm still present  If confirmed plan for uptake and scan  Asked to discontinue iodine drops. Discussed recommended daily intake and that standard American diet is typically sufficient    Prediabetes  Repeat A1c, lipids, chem panel  Encouraged efforts to modify diet and regular exercise as he is doing  Interested in trial of FreeStyle Aria to watch effect of various foods on glucose and will consider pending labs    RTC 1 year    Eileen Dias MD      "

## 2019-01-22 ENCOUNTER — PATIENT MESSAGE (OUTPATIENT)
Dept: ENDOCRINOLOGY | Facility: CLINIC | Age: 64
End: 2019-01-22

## 2019-01-22 ENCOUNTER — LAB VISIT (OUTPATIENT)
Dept: LAB | Facility: HOSPITAL | Age: 64
End: 2019-01-22
Attending: INTERNAL MEDICINE
Payer: COMMERCIAL

## 2019-01-22 ENCOUNTER — TELEPHONE (OUTPATIENT)
Dept: ENDOCRINOLOGY | Facility: CLINIC | Age: 64
End: 2019-01-22

## 2019-01-22 DIAGNOSIS — E04.1 THYROID NODULE: ICD-10-CM

## 2019-01-22 DIAGNOSIS — E05.90 SUBCLINICAL HYPERTHYROIDISM: ICD-10-CM

## 2019-01-22 DIAGNOSIS — E04.1 THYROID NODULE: Primary | ICD-10-CM

## 2019-01-22 DIAGNOSIS — R73.03 PREDIABETES: ICD-10-CM

## 2019-01-22 LAB
ANION GAP SERPL CALC-SCNC: 6 MMOL/L
BUN SERPL-MCNC: 14 MG/DL
CALCIUM SERPL-MCNC: 9.1 MG/DL
CHLORIDE SERPL-SCNC: 104 MMOL/L
CHOLEST SERPL-MCNC: 204 MG/DL
CHOLEST/HDLC SERPL: 3.8 {RATIO}
CO2 SERPL-SCNC: 30 MMOL/L
CREAT SERPL-MCNC: 1 MG/DL
EST. GFR  (AFRICAN AMERICAN): >60 ML/MIN/1.73 M^2
EST. GFR  (NON AFRICAN AMERICAN): >60 ML/MIN/1.73 M^2
ESTIMATED AVG GLUCOSE: 123 MG/DL
GLUCOSE SERPL-MCNC: 98 MG/DL
HBA1C MFR BLD HPLC: 5.9 %
HDLC SERPL-MCNC: 53 MG/DL
HDLC SERPL: 26 %
LDLC SERPL CALC-MCNC: 136.8 MG/DL
NONHDLC SERPL-MCNC: 151 MG/DL
POTASSIUM SERPL-SCNC: 4.3 MMOL/L
SODIUM SERPL-SCNC: 140 MMOL/L
T4 FREE SERPL-MCNC: 0.97 NG/DL
TRIGL SERPL-MCNC: 71 MG/DL
TSH SERPL DL<=0.005 MIU/L-ACNC: 1.47 UIU/ML

## 2019-01-22 PROCEDURE — 80048 BASIC METABOLIC PNL TOTAL CA: CPT

## 2019-01-22 PROCEDURE — 36415 COLL VENOUS BLD VENIPUNCTURE: CPT | Mod: PO

## 2019-01-22 PROCEDURE — 84443 ASSAY THYROID STIM HORMONE: CPT

## 2019-01-22 PROCEDURE — 80061 LIPID PANEL: CPT

## 2019-01-22 PROCEDURE — 83036 HEMOGLOBIN GLYCOSYLATED A1C: CPT

## 2019-01-22 PROCEDURE — 84439 ASSAY OF FREE THYROXINE: CPT

## 2019-01-22 NOTE — TELEPHONE ENCOUNTER
Spoke with patient informed him of labs results per provider also scheduled FNA appt patient verbalized understanding.

## 2019-01-30 RX ORDER — TESTOSTERONE CYPIONATE 200 MG/ML
300 INJECTION, SOLUTION INTRAMUSCULAR
Qty: 10 ML | Refills: 1 | Status: SHIPPED | OUTPATIENT
Start: 2019-01-30 | End: 2019-08-20 | Stop reason: SDUPTHER

## 2019-02-01 ENCOUNTER — HOSPITAL ENCOUNTER (OUTPATIENT)
Dept: ENDOCRINOLOGY | Facility: CLINIC | Age: 64
Discharge: HOME | End: 2019-02-01
Attending: INTERNAL MEDICINE
Payer: COMMERCIAL

## 2019-02-01 DIAGNOSIS — E04.1 THYROID NODULE: ICD-10-CM

## 2019-02-01 PROCEDURE — 10005 PR FINE NEEDLE ASP BIOPSY, W/US GUIDANCE, 1ST LESION: ICD-10-PCS | Mod: S$GLB,ICN,, | Performed by: INTERNAL MEDICINE

## 2019-02-01 PROCEDURE — 88173 CYTOPATH EVAL FNA REPORT: CPT | Performed by: PATHOLOGY

## 2019-02-01 PROCEDURE — 10005 FNA BX W/US GDN 1ST LES: CPT | Mod: S$GLB,ICN,, | Performed by: INTERNAL MEDICINE

## 2019-02-01 PROCEDURE — 10006 FNA BX W/US GDN EA ADDL: CPT | Mod: S$GLB,ICN,, | Performed by: INTERNAL MEDICINE

## 2019-02-01 PROCEDURE — 88173 CYTOPATH EVAL FNA REPORT: CPT | Mod: 26,,, | Performed by: PATHOLOGY

## 2019-02-01 PROCEDURE — 88173 CYTOLOGY SPECIMEN-FNA NON-RADIOLOGY CLINICIAN PERFORMED W/O ON SITE: ICD-10-PCS | Mod: 26,,, | Performed by: PATHOLOGY

## 2019-02-01 PROCEDURE — 10006 PR FINE NEEDLE ASP BIOPSY, W/US GUIDANCE, EA ADDTL LESION: ICD-10-PCS | Mod: S$GLB,ICN,, | Performed by: INTERNAL MEDICINE

## 2019-02-07 ENCOUNTER — PATIENT MESSAGE (OUTPATIENT)
Dept: ENDOCRINOLOGY | Facility: CLINIC | Age: 64
End: 2019-02-07

## 2019-02-07 DIAGNOSIS — I10 ESSENTIAL HYPERTENSION: ICD-10-CM

## 2019-02-07 RX ORDER — LISINOPRIL 40 MG/1
40 TABLET ORAL DAILY
Qty: 90 TABLET | Refills: 3 | Status: SHIPPED | OUTPATIENT
Start: 2019-02-07 | End: 2019-02-20

## 2019-02-11 ENCOUNTER — HOSPITAL ENCOUNTER (OUTPATIENT)
Dept: RADIOLOGY | Facility: HOSPITAL | Age: 64
Discharge: HOME | End: 2019-02-11
Attending: NEUROLOGICAL SURGERY
Payer: COMMERCIAL

## 2019-02-11 ENCOUNTER — OFFICE VISIT (OUTPATIENT)
Dept: NEUROSURGERY | Facility: CLINIC | Age: 64
End: 2019-02-11
Payer: COMMERCIAL

## 2019-02-11 VITALS
SYSTOLIC BLOOD PRESSURE: 133 MMHG | HEART RATE: 45 BPM | BODY MASS INDEX: 27.47 KG/M2 | TEMPERATURE: 98 F | HEIGHT: 73 IN | WEIGHT: 207.31 LBS | DIASTOLIC BLOOD PRESSURE: 84 MMHG

## 2019-02-11 DIAGNOSIS — R42 VERTIGO: ICD-10-CM

## 2019-02-11 DIAGNOSIS — J98.6 HEMIDIAPHRAGM PARALYSIS: ICD-10-CM

## 2019-02-11 DIAGNOSIS — Q28.3 CAVERNOUS MALFORMATION: ICD-10-CM

## 2019-02-11 DIAGNOSIS — M47.812 SPONDYLOSIS OF CERVICAL REGION WITHOUT MYELOPATHY OR RADICULOPATHY: Primary | ICD-10-CM

## 2019-02-11 PROCEDURE — 3075F PR MOST RECENT SYSTOLIC BLOOD PRESS GE 130-139MM HG: ICD-10-PCS | Mod: CPTII,S$GLB,, | Performed by: NEUROLOGICAL SURGERY

## 2019-02-11 PROCEDURE — 3079F DIAST BP 80-89 MM HG: CPT | Mod: CPTII,S$GLB,, | Performed by: NEUROLOGICAL SURGERY

## 2019-02-11 PROCEDURE — 99205 OFFICE O/P NEW HI 60 MIN: CPT | Mod: S$GLB,,, | Performed by: NEUROLOGICAL SURGERY

## 2019-02-11 PROCEDURE — 99999 PR PBB SHADOW E&M-EST. PATIENT-LVL III: ICD-10-PCS | Mod: PBBFAC,,, | Performed by: NEUROLOGICAL SURGERY

## 2019-02-11 PROCEDURE — 70551 MRI BRAIN STEM W/O DYE: CPT | Mod: TC

## 2019-02-11 PROCEDURE — 99999 PR PBB SHADOW E&M-EST. PATIENT-LVL III: CPT | Mod: PBBFAC,,, | Performed by: NEUROLOGICAL SURGERY

## 2019-02-11 PROCEDURE — 70551 MRI BRAIN WITHOUT CONTRAST: ICD-10-PCS | Mod: 26,,, | Performed by: RADIOLOGY

## 2019-02-11 PROCEDURE — 3008F PR BODY MASS INDEX (BMI) DOCUMENTED: ICD-10-PCS | Mod: CPTII,S$GLB,, | Performed by: NEUROLOGICAL SURGERY

## 2019-02-11 PROCEDURE — 70551 MRI BRAIN STEM W/O DYE: CPT | Mod: 26,,, | Performed by: RADIOLOGY

## 2019-02-11 PROCEDURE — 99205 PR OFFICE/OUTPT VISIT, NEW, LEVL V, 60-74 MIN: ICD-10-PCS | Mod: S$GLB,,, | Performed by: NEUROLOGICAL SURGERY

## 2019-02-11 PROCEDURE — 3008F BODY MASS INDEX DOCD: CPT | Mod: CPTII,S$GLB,, | Performed by: NEUROLOGICAL SURGERY

## 2019-02-11 PROCEDURE — 3079F PR MOST RECENT DIASTOLIC BLOOD PRESSURE 80-89 MM HG: ICD-10-PCS | Mod: CPTII,S$GLB,, | Performed by: NEUROLOGICAL SURGERY

## 2019-02-11 PROCEDURE — 3075F SYST BP GE 130 - 139MM HG: CPT | Mod: CPTII,S$GLB,, | Performed by: NEUROLOGICAL SURGERY
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
## 2019-02-11 NOTE — LETTER
February 11, 2019      Kaiser June MD  [address] [facility] [address]           [facility] - Neurosurgery 7th Fl  [address]  [place] [address]  Phone: [phone]          Patient: Dominguez Zapata   MR Number: [id number]   YOB: 1955   Date of Visit: 2/11/2019       Dear Dr. Kaiser June:    Thank you for referring Dominguez Zapata to me for evaluation. Attached you will find relevant portions of my assessment and plan of care.    If you have questions, please do not hesitate to call me. I look forward to following Dominguez Zapata along with you.    Sincerely,    Haja Molina MD    Enclosure  CC:  No Recipients    If you would like to receive this communication electronically, please contact [email or web address] or [phone] to request more information on [employer] access.    For providers and/or their staff who would like to refer a patient to [facility], please contact us through our one-stop-shop provider referral line, [facility], at [phone].    If you feel you have received this communication in error or would no longer like to receive these types of communications, please e-mail [email or web address]

## 2019-02-12 ENCOUNTER — TELEPHONE (OUTPATIENT)
Dept: NEUROSURGERY | Facility: CLINIC | Age: 64
End: 2019-02-12

## 2019-02-12 NOTE — TELEPHONE ENCOUNTER
Pt was contacted and informed of his imaging results per . The pt verbalized understanding and will f/u PRN  ----- Message from Haja Molina MD sent at 2/11/2019  9:19 PM CST -----  Please let the patient know that his MRI shows no additional bleeds on his previous cavernoma.     e

## 2019-02-12 NOTE — TELEPHONE ENCOUNTER
----- Message from Haja Molina MD sent at 2/11/2019  9:19 PM CST -----  Please let the patient know that his MRI shows no additional bleeds on his previous cavernoma.     e

## 2019-02-14 ENCOUNTER — TELEPHONE (OUTPATIENT)
Dept: INTERNAL MEDICINE | Facility: CLINIC | Age: 64
End: 2019-02-14

## 2019-02-14 NOTE — TELEPHONE ENCOUNTER
PLease order labs to be linked to annual physical appt.   Pt would like PSA & testosterone done too please    LMOR for pt to [facility] or email re: lab appt. Pt needs labs tomorrow or Saturday at the latest.

## 2019-02-17 ENCOUNTER — PATIENT MESSAGE (OUTPATIENT)
Dept: NEUROSURGERY | Facility: CLINIC | Age: 64
End: 2019-02-17

## 2019-02-18 ENCOUNTER — LAB VISIT (OUTPATIENT)
Dept: LAB | Facility: HOSPITAL | Age: 64
End: 2019-02-18
Attending: FAMILY MEDICINE
Payer: COMMERCIAL

## 2019-02-18 ENCOUNTER — TELEPHONE (OUTPATIENT)
Dept: INTERNAL MEDICINE | Facility: CLINIC | Age: 64
End: 2019-02-18

## 2019-02-18 DIAGNOSIS — R73.03 PREDIABETES: ICD-10-CM

## 2019-02-18 DIAGNOSIS — E04.1 THYROID NODULE: ICD-10-CM

## 2019-02-18 DIAGNOSIS — E78.5 HYPERLIPIDEMIA, UNSPECIFIED HYPERLIPIDEMIA TYPE: ICD-10-CM

## 2019-02-18 DIAGNOSIS — E29.1 HYPOGONADISM MALE: ICD-10-CM

## 2019-02-18 DIAGNOSIS — N40.1 BENIGN PROSTATIC HYPERPLASIA WITH LOWER URINARY TRACT SYMPTOMS, SYMPTOM DETAILS UNSPECIFIED: ICD-10-CM

## 2019-02-18 DIAGNOSIS — Z00.00 WELL ADULT EXAM: ICD-10-CM

## 2019-02-18 DIAGNOSIS — E05.90 SUBCLINICAL HYPERTHYROIDISM: ICD-10-CM

## 2019-02-18 DIAGNOSIS — R97.20 ELEVATED PSA: ICD-10-CM

## 2019-02-18 DIAGNOSIS — Z00.00 WELL ADULT EXAM: Primary | ICD-10-CM

## 2019-02-18 DIAGNOSIS — I10 ESSENTIAL HYPERTENSION: ICD-10-CM

## 2019-02-18 LAB
25(OH)D3+25(OH)D2 SERPL-MCNC: 54 NG/ML
ALBUMIN SERPL BCP-MCNC: 4.2 G/DL
ALP SERPL-CCNC: 50 U/L
ALT SERPL W/O P-5'-P-CCNC: 21 U/L
ANION GAP SERPL CALC-SCNC: 8 MMOL/L
AST SERPL-CCNC: 27 U/L
BASOPHILS # BLD AUTO: 0.04 K/UL
BASOPHILS NFR BLD: 0.7 %
BILIRUB SERPL-MCNC: 1.1 MG/DL
BUN SERPL-MCNC: 11 MG/DL
CALCIUM SERPL-MCNC: 9.1 MG/DL
CHLORIDE SERPL-SCNC: 103 MMOL/L
CHOLEST SERPL-MCNC: 198 MG/DL
CHOLEST/HDLC SERPL: 4 {RATIO}
CO2 SERPL-SCNC: 28 MMOL/L
CREAT SERPL-MCNC: 1 MG/DL
DIFFERENTIAL METHOD: ABNORMAL
EOSINOPHIL # BLD AUTO: 0.1 K/UL
EOSINOPHIL NFR BLD: 1.4 %
ERYTHROCYTE [DISTWIDTH] IN BLOOD BY AUTOMATED COUNT: 13.4 %
EST. GFR  (AFRICAN AMERICAN): >60 ML/MIN/1.73 M^2
EST. GFR  (NON AFRICAN AMERICAN): >60 ML/MIN/1.73 M^2
ESTIMATED AVG GLUCOSE: 120 MG/DL
GLUCOSE SERPL-MCNC: 118 MG/DL
HBA1C MFR BLD HPLC: 5.8 %
HCT VFR BLD AUTO: 48 %
HDLC SERPL-MCNC: 50 MG/DL
HDLC SERPL: 25.3 %
HGB BLD-MCNC: 15.9 G/DL
IMM GRANULOCYTES # BLD AUTO: 0.02 K/UL
IMM GRANULOCYTES NFR BLD AUTO: 0.4 %
LDLC SERPL CALC-MCNC: 133.2 MG/DL
LYMPHOCYTES # BLD AUTO: 1.2 K/UL
LYMPHOCYTES NFR BLD: 20.9 %
MCH RBC QN AUTO: 32.1 PG
MCHC RBC AUTO-ENTMCNC: 33.1 G/DL
MCV RBC AUTO: 97 FL
MONOCYTES # BLD AUTO: 0.5 K/UL
MONOCYTES NFR BLD: 9.2 %
NEUTROPHILS # BLD AUTO: 3.8 K/UL
NEUTROPHILS NFR BLD: 67.4 %
NONHDLC SERPL-MCNC: 148 MG/DL
NRBC BLD-RTO: 0 /100 WBC
PLATELET # BLD AUTO: 228 K/UL
PMV BLD AUTO: 11.5 FL
POTASSIUM SERPL-SCNC: 4.2 MMOL/L
PROSTATE SPECIFIC ANTIGEN, TOTAL: 8.8 NG/ML
PROT SERPL-MCNC: 7.2 G/DL
PSA FREE MFR SERPL: 20.34 %
PSA FREE SERPL-MCNC: 1.79 NG/ML
RBC # BLD AUTO: 4.95 M/UL
SODIUM SERPL-SCNC: 139 MMOL/L
T4 FREE SERPL-MCNC: 0.91 NG/DL
TESTOST SERPL-MCNC: 435 NG/DL
TRIGL SERPL-MCNC: 74 MG/DL
TSH SERPL DL<=0.005 MIU/L-ACNC: 0.76 UIU/ML
WBC # BLD AUTO: 5.64 K/UL

## 2019-02-18 PROCEDURE — 80061 LIPID PANEL: CPT

## 2019-02-18 PROCEDURE — 84403 ASSAY OF TOTAL TESTOSTERONE: CPT

## 2019-02-18 PROCEDURE — 83036 HEMOGLOBIN GLYCOSYLATED A1C: CPT

## 2019-02-18 PROCEDURE — 84439 ASSAY OF FREE THYROXINE: CPT

## 2019-02-18 PROCEDURE — 82306 VITAMIN D 25 HYDROXY: CPT

## 2019-02-18 PROCEDURE — 85025 COMPLETE CBC W/AUTO DIFF WBC: CPT

## 2019-02-18 PROCEDURE — 80053 COMPREHEN METABOLIC PANEL: CPT

## 2019-02-18 PROCEDURE — 84443 ASSAY THYROID STIM HORMONE: CPT

## 2019-02-18 PROCEDURE — 36415 COLL VENOUS BLD VENIPUNCTURE: CPT | Mod: PO

## 2019-02-18 PROCEDURE — 84154 ASSAY OF PSA FREE: CPT

## 2019-02-18 NOTE — TELEPHONE ENCOUNTER
----- Message from Elva Nettles sent at 2/18/2019  7:48 AM CST -----  Contact: [facility]  Patient is at [facility] waiting for his labs and urine to be tested  and fasting,  however there are no orders attached. Please link orders.

## 2019-02-19 ENCOUNTER — OFFICE VISIT (OUTPATIENT)
Dept: SLEEP MEDICINE | Facility: CLINIC | Age: 64
End: 2019-02-19
Payer: COMMERCIAL

## 2019-02-19 VITALS
BODY MASS INDEX: 27.43 KG/M2 | HEIGHT: 73 IN | SYSTOLIC BLOOD PRESSURE: 120 MMHG | WEIGHT: 207 LBS | HEART RATE: 72 BPM | DIASTOLIC BLOOD PRESSURE: 70 MMHG

## 2019-02-19 DIAGNOSIS — G47.33 OSA (OBSTRUCTIVE SLEEP APNEA): Primary | ICD-10-CM

## 2019-02-19 PROCEDURE — 99999 PR PBB SHADOW E&M-EST. PATIENT-LVL III: ICD-10-PCS | Mod: PBBFAC,,, | Performed by: PSYCHIATRY & NEUROLOGY

## 2019-02-19 PROCEDURE — 99999 PR PBB SHADOW E&M-EST. PATIENT-LVL III: CPT | Mod: PBBFAC,,, | Performed by: PSYCHIATRY & NEUROLOGY

## 2019-02-19 PROCEDURE — 99214 PR OFFICE/OUTPT VISIT, EST, LEVL IV, 30-39 MIN: ICD-10-PCS | Mod: S$GLB,,, | Performed by: PSYCHIATRY & NEUROLOGY

## 2019-02-19 PROCEDURE — 3074F PR MOST RECENT SYSTOLIC BLOOD PRESSURE < 130 MM HG: ICD-10-PCS | Mod: CPTII,S$GLB,, | Performed by: PSYCHIATRY & NEUROLOGY

## 2019-02-19 PROCEDURE — 3074F SYST BP LT 130 MM HG: CPT | Mod: CPTII,S$GLB,, | Performed by: PSYCHIATRY & NEUROLOGY

## 2019-02-19 PROCEDURE — 99214 OFFICE O/P EST MOD 30 MIN: CPT | Mod: S$GLB,,, | Performed by: PSYCHIATRY & NEUROLOGY

## 2019-02-19 PROCEDURE — 3078F PR MOST RECENT DIASTOLIC BLOOD PRESSURE < 80 MM HG: ICD-10-PCS | Mod: CPTII,S$GLB,, | Performed by: PSYCHIATRY & NEUROLOGY

## 2019-02-19 PROCEDURE — 3078F DIAST BP <80 MM HG: CPT | Mod: CPTII,S$GLB,, | Performed by: PSYCHIATRY & NEUROLOGY

## 2019-02-19 PROCEDURE — 3008F PR BODY MASS INDEX (BMI) DOCUMENTED: ICD-10-PCS | Mod: CPTII,S$GLB,, | Performed by: PSYCHIATRY & NEUROLOGY

## 2019-02-19 PROCEDURE — 3008F BODY MASS INDEX DOCD: CPT | Mod: CPTII,S$GLB,, | Performed by: PSYCHIATRY & NEUROLOGY
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
## 2019-02-20 ENCOUNTER — OFFICE VISIT (OUTPATIENT)
Dept: INTERNAL MEDICINE | Facility: CLINIC | Age: 64
End: 2019-02-20
Payer: COMMERCIAL

## 2019-02-20 VITALS
HEART RATE: 64 BPM | SYSTOLIC BLOOD PRESSURE: 148 MMHG | WEIGHT: 207.25 LBS | RESPIRATION RATE: 16 BRPM | DIASTOLIC BLOOD PRESSURE: 84 MMHG | TEMPERATURE: 99 F | OXYGEN SATURATION: 97 % | HEIGHT: 73 IN | BODY MASS INDEX: 27.47 KG/M2

## 2019-02-20 DIAGNOSIS — E29.1 HYPOGONADISM MALE: ICD-10-CM

## 2019-02-20 DIAGNOSIS — E05.90 SUBCLINICAL HYPERTHYROIDISM: ICD-10-CM

## 2019-02-20 DIAGNOSIS — R73.03 PREDIABETES: ICD-10-CM

## 2019-02-20 DIAGNOSIS — Z12.11 COLON CANCER SCREENING: ICD-10-CM

## 2019-02-20 DIAGNOSIS — J98.6 HEMIDIAPHRAGM PARALYSIS: ICD-10-CM

## 2019-02-20 DIAGNOSIS — Z00.00 WELL ADULT EXAM: Primary | ICD-10-CM

## 2019-02-20 DIAGNOSIS — Q28.3 CAVERNOUS MALFORMATION: ICD-10-CM

## 2019-02-20 DIAGNOSIS — I10 ESSENTIAL HYPERTENSION: ICD-10-CM

## 2019-02-20 DIAGNOSIS — R94.2 ABNORMAL PFTS (PULMONARY FUNCTION TESTS): ICD-10-CM

## 2019-02-20 DIAGNOSIS — I77.810 ASCENDING AORTA DILATATION: Chronic | ICD-10-CM

## 2019-02-20 DIAGNOSIS — I25.10 CORONARY ARTERY DISEASE DUE TO CALCIFIED CORONARY LESION: ICD-10-CM

## 2019-02-20 DIAGNOSIS — E04.1 THYROID NODULE: ICD-10-CM

## 2019-02-20 DIAGNOSIS — E78.5 HYPERLIPIDEMIA, UNSPECIFIED HYPERLIPIDEMIA TYPE: ICD-10-CM

## 2019-02-20 DIAGNOSIS — I25.84 CORONARY ARTERY DISEASE DUE TO CALCIFIED CORONARY LESION: ICD-10-CM

## 2019-02-20 DIAGNOSIS — J98.6 ELEVATED DIAPHRAGM: ICD-10-CM

## 2019-02-20 DIAGNOSIS — G47.31 COMPLEX SLEEP APNEA SYNDROME: ICD-10-CM

## 2019-02-20 DIAGNOSIS — N40.1 BENIGN PROSTATIC HYPERPLASIA WITH LOWER URINARY TRACT SYMPTOMS, SYMPTOM DETAILS UNSPECIFIED: ICD-10-CM

## 2019-02-20 PROCEDURE — 90686 FLU VACCINE (QUAD) GREATER THAN OR EQUAL TO 3YO PRESERVATIVE FREE IM: ICD-10-PCS | Mod: S$GLB,,, | Performed by: FAMILY MEDICINE

## 2019-02-20 PROCEDURE — 99396 PR PREVENTIVE VISIT,EST,40-64: ICD-10-PCS | Mod: 25,S$GLB,, | Performed by: FAMILY MEDICINE

## 2019-02-20 PROCEDURE — 99999 PR PBB SHADOW E&M-EST. PATIENT-LVL IV: ICD-10-PCS | Mod: PBBFAC,,, | Performed by: FAMILY MEDICINE

## 2019-02-20 PROCEDURE — 3079F DIAST BP 80-89 MM HG: CPT | Mod: CPTII,S$GLB,, | Performed by: FAMILY MEDICINE

## 2019-02-20 PROCEDURE — 90471 FLU VACCINE (QUAD) GREATER THAN OR EQUAL TO 3YO PRESERVATIVE FREE IM: ICD-10-PCS | Mod: S$GLB,,, | Performed by: FAMILY MEDICINE

## 2019-02-20 PROCEDURE — 3079F PR MOST RECENT DIASTOLIC BLOOD PRESSURE 80-89 MM HG: ICD-10-PCS | Mod: CPTII,S$GLB,, | Performed by: FAMILY MEDICINE

## 2019-02-20 PROCEDURE — 99396 PREV VISIT EST AGE 40-64: CPT | Mod: 25,S$GLB,, | Performed by: FAMILY MEDICINE

## 2019-02-20 PROCEDURE — 3077F PR MOST RECENT SYSTOLIC BLOOD PRESSURE >= 140 MM HG: ICD-10-PCS | Mod: CPTII,S$GLB,, | Performed by: FAMILY MEDICINE

## 2019-02-20 PROCEDURE — 90471 IMMUNIZATION ADMIN: CPT | Mod: S$GLB,,, | Performed by: FAMILY MEDICINE

## 2019-02-20 PROCEDURE — 3077F SYST BP >= 140 MM HG: CPT | Mod: CPTII,S$GLB,, | Performed by: FAMILY MEDICINE

## 2019-02-20 PROCEDURE — 99999 PR PBB SHADOW E&M-EST. PATIENT-LVL IV: CPT | Mod: PBBFAC,,, | Performed by: FAMILY MEDICINE

## 2019-02-20 PROCEDURE — 90686 IIV4 VACC NO PRSV 0.5 ML IM: CPT | Mod: S$GLB,,, | Performed by: FAMILY MEDICINE

## 2019-02-20 RX ORDER — METFORMIN HYDROCHLORIDE 500 MG/1
500 TABLET ORAL 2 TIMES DAILY WITH MEALS
Qty: 180 TABLET | Refills: 3 | Status: SHIPPED | OUTPATIENT
Start: 2019-02-20 | End: 2020-02-12

## 2019-02-20 RX ORDER — LOSARTAN POTASSIUM 100 MG/1
100 TABLET ORAL DAILY
Qty: 90 TABLET | Refills: 3 | Status: SHIPPED | OUTPATIENT
Start: 2019-02-20 | End: 2021-02-17 | Stop reason: SDUPTHER

## 2019-02-20 NOTE — PROGRESS NOTES
Subjective:       Patient ID: Dominguez Zapata is a 63 y.o. male.    Chief Complaint: Annual Exam (physical)    HPI 63-year-old white male presents to clinic today for annual physical exam.  He continues to be followed by Urology secondary to BPH and hypogonadism which remains stable.  At this time he is followed by neurosurgery secondary to a stable cavernous malformation.  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  Hypertension has been treated with lisinopril 40 mg daily; however, blood pressure continues to remain mildly elevated with an average home blood pressure reading of 140/90.  I have recommended discontinuing lisinopril and starting losartan.  He patient continues to have prediabetes which remains well controlled with a hemoglobin A1c of 5.8; however, he has become concerned with frequent elevated blood glucose readings.  I have recommended increasing metformin to b.i.d..  He has a past surgical history prostate biopsy and hernia repair.  He has a family history of his mother passed away from renal failure at age 70.  His father passed away from diabetes at 83.  He is due for colonoscopy at this time.  Flu vaccine and tetanus vaccine has been discussed.  Review of Systems   Constitutional: Negative for appetite change, chills, fatigue and fever.   HENT: Negative for congestion, ear pain, hearing loss, postnasal drip, rhinorrhea, sinus pressure, sore throat and tinnitus.    Eyes: Negative for redness, itching and visual disturbance.   Respiratory: Negative for cough, chest tightness and shortness of breath.    Cardiovascular: Negative for chest pain and palpitations.   Gastrointestinal: Positive for abdominal pain and nausea. Negative for constipation, diarrhea and vomiting.   Genitourinary: Negative for decreased urine volume, difficulty urinating, dysuria, frequency, hematuria and urgency.   Musculoskeletal: Positive for  arthralgias and myalgias. Negative for back pain, neck pain and neck stiffness.   Skin: Negative for rash.   Neurological: Negative for dizziness, light-headedness and headaches.   Psychiatric/Behavioral: Negative.        Objective:      Physical Exam   Constitutional: He is oriented to person, place, and time. He appears well-developed and well-nourished. No distress.   HENT:   Head: Normocephalic and atraumatic.   Right Ear: External ear normal.   Left Ear: External ear normal.   Nose: Nose normal.   Mouth/Throat: Oropharynx is clear and moist. No oropharyngeal exudate.   Eyes: Conjunctivae and EOM are normal. Pupils are equal, round, and reactive to light. Right eye exhibits no discharge. Left eye exhibits no discharge. No scleral icterus.   Neck: Normal range of motion. Neck supple. No JVD present. No tracheal deviation present. No thyromegaly present.   Cardiovascular: Normal rate, regular rhythm, normal heart sounds and intact distal pulses. Exam reveals no gallop and no friction rub.   No murmur heard.  Pulmonary/Chest: Effort normal and breath sounds normal. No stridor. No respiratory distress. He has no wheezes. He has no rales.   Abdominal: Soft. Bowel sounds are normal. He exhibits no distension and no mass. There is no tenderness. There is no rebound and no guarding.   Musculoskeletal: Normal range of motion. He exhibits no edema or tenderness.   Lymphadenopathy:     He has no cervical adenopathy.   Neurological: He is alert and oriented to person, place, and time.   Skin: Skin is warm and dry. No rash noted. He is not diaphoretic. No erythema. No pallor.   Psychiatric: He has a normal mood and affect. His behavior is normal. Judgment and thought content normal.   Nursing note and vitals reviewed.      Assessment:       1. Well adult exam    2. Prediabetes    3. Abnormal PFTs (pulmonary function tests)    4. Hemidiaphragm paralysis    5. Elevated diaphragm    6. Cavernous malformation    7. Ascending  aorta dilatation    8. Benign prostatic hyperplasia with lower urinary tract symptoms, symptom details unspecified    9. Complex sleep apnea syndrome    10. Coronary artery disease due to calcified coronary lesion    11. Hyperlipidemia, unspecified hyperlipidemia type    12. Essential hypertension    13. Hypogonadism male    14. Thyroid nodule    15. Subclinical hyperthyroidism    16. Colon cancer screening        Plan:       1.  Labs have been reviewed and are overall within normal limits.  2.  Secondary to patient concerns of frequent blood glucose spikes, recommend increasing metformin to 500 mg b.i.d..  Freestyle Aria prescribed.  3.  Continue follow-up with Neurosurgery as scheduled.  Cavernous malformation remained stable.  4.  Recommend starting aspirin 81 mg daily.  5.  Discontinue lisinopril and start losartan 100 mg daily.  6.  Continue follow-up with Urology as scheduled.  7.  Refer to pulmonology for further evaluation and treatment of elevation of hemidiaphragm.  8.  Tdap prescription given.  9.  Flu vaccine given.  10.  Screening colonoscopy.  11.  Return to clinic as needed or in 1 month for hypertension re-evaluation.

## 2019-02-26 ENCOUNTER — HOSPITAL ENCOUNTER (OUTPATIENT)
Dept: PULMONOLOGY | Facility: CLINIC | Age: 64
Discharge: HOME | End: 2019-02-26
Payer: COMMERCIAL

## 2019-02-26 ENCOUNTER — TELEPHONE (OUTPATIENT)
Dept: PULMONOLOGY | Facility: CLINIC | Age: 64
End: 2019-02-26

## 2019-02-26 ENCOUNTER — HOSPITAL ENCOUNTER (OUTPATIENT)
Dept: RADIOLOGY | Facility: HOSPITAL | Age: 64
Discharge: HOME | End: 2019-02-26
Attending: INTERNAL MEDICINE
Payer: COMMERCIAL

## 2019-02-26 ENCOUNTER — PATIENT MESSAGE (OUTPATIENT)
Dept: SLEEP MEDICINE | Facility: CLINIC | Age: 64
End: 2019-02-26

## 2019-02-26 ENCOUNTER — OFFICE VISIT (OUTPATIENT)
Dept: PULMONOLOGY | Facility: CLINIC | Age: 64
End: 2019-02-26
Payer: COMMERCIAL

## 2019-02-26 VITALS
SYSTOLIC BLOOD PRESSURE: 120 MMHG | DIASTOLIC BLOOD PRESSURE: 80 MMHG | OXYGEN SATURATION: 98 % | HEART RATE: 62 BPM | WEIGHT: 203 LBS | HEIGHT: 73 IN | BODY MASS INDEX: 26.9 KG/M2

## 2019-02-26 DIAGNOSIS — J98.6 ELEVATED DIAPHRAGM: ICD-10-CM

## 2019-02-26 DIAGNOSIS — R06.02 SHORTNESS OF BREATH: ICD-10-CM

## 2019-02-26 DIAGNOSIS — J98.6 HEMIDIAPHRAGM PARALYSIS: ICD-10-CM

## 2019-02-26 DIAGNOSIS — R05.9 COUGH: Primary | ICD-10-CM

## 2019-02-26 DIAGNOSIS — J45.20 MILD INTERMITTENT ASTHMA WITHOUT COMPLICATION: ICD-10-CM

## 2019-02-26 DIAGNOSIS — R94.2 ABNORMAL PFTS (PULMONARY FUNCTION TESTS): ICD-10-CM

## 2019-02-26 DIAGNOSIS — R05.9 COUGH: ICD-10-CM

## 2019-02-26 LAB
PRE FEV1 FVC: 75
PRE FEV1: 3.33
PRE FVC: 4.44
PREDICTED FEV1 FVC: 78
PREDICTED FEV1: 4.13
PREDICTED FVC: 5.16

## 2019-02-26 PROCEDURE — 71046 X-RAY EXAM CHEST 2 VIEWS: CPT | Mod: TC,FY

## 2019-02-26 PROCEDURE — 99213 PR OFFICE/OUTPT VISIT, EST, LEVL III, 20-29 MIN: ICD-10-PCS | Mod: S$GLB,,, | Performed by: INTERNAL MEDICINE

## 2019-02-26 PROCEDURE — 99999 PR PBB SHADOW E&M-EST. PATIENT-LVL III: CPT | Mod: PBBFAC,,, | Performed by: INTERNAL MEDICINE

## 2019-02-26 PROCEDURE — 71046 X-RAY EXAM CHEST 2 VIEWS: CPT | Mod: 26,,, | Performed by: RADIOLOGY

## 2019-02-26 PROCEDURE — 3008F BODY MASS INDEX DOCD: CPT | Mod: CPTII,S$GLB,, | Performed by: INTERNAL MEDICINE

## 2019-02-26 PROCEDURE — 94729 DIFFUSING CAPACITY: CPT | Mod: S$GLB,,, | Performed by: INTERNAL MEDICINE

## 2019-02-26 PROCEDURE — 99213 OFFICE O/P EST LOW 20 MIN: CPT | Mod: S$GLB,,, | Performed by: INTERNAL MEDICINE

## 2019-02-26 PROCEDURE — 94729 PR C02/MEMBANE DIFFUSE CAPACITY: ICD-10-PCS | Mod: S$GLB,,, | Performed by: INTERNAL MEDICINE

## 2019-02-26 PROCEDURE — 3074F PR MOST RECENT SYSTOLIC BLOOD PRESSURE < 130 MM HG: ICD-10-PCS | Mod: CPTII,S$GLB,, | Performed by: INTERNAL MEDICINE

## 2019-02-26 PROCEDURE — 3008F PR BODY MASS INDEX (BMI) DOCUMENTED: ICD-10-PCS | Mod: CPTII,S$GLB,, | Performed by: INTERNAL MEDICINE

## 2019-02-26 PROCEDURE — 3079F PR MOST RECENT DIASTOLIC BLOOD PRESSURE 80-89 MM HG: ICD-10-PCS | Mod: CPTII,S$GLB,, | Performed by: INTERNAL MEDICINE

## 2019-02-26 PROCEDURE — 3074F SYST BP LT 130 MM HG: CPT | Mod: CPTII,S$GLB,, | Performed by: INTERNAL MEDICINE

## 2019-02-26 PROCEDURE — 3079F DIAST BP 80-89 MM HG: CPT | Mod: CPTII,S$GLB,, | Performed by: INTERNAL MEDICINE

## 2019-02-26 PROCEDURE — 94010 BREATHING CAPACITY TEST: ICD-10-PCS | Mod: S$GLB,,, | Performed by: INTERNAL MEDICINE

## 2019-02-26 PROCEDURE — 71046 XR CHEST PA AND LATERAL: ICD-10-PCS | Mod: 26,,, | Performed by: RADIOLOGY

## 2019-02-26 PROCEDURE — 94010 BREATHING CAPACITY TEST: CPT | Mod: S$GLB,,, | Performed by: INTERNAL MEDICINE

## 2019-02-26 PROCEDURE — 99999 PR PBB SHADOW E&M-EST. PATIENT-LVL III: ICD-10-PCS | Mod: PBBFAC,,, | Performed by: INTERNAL MEDICINE

## 2019-02-26 RX ORDER — TETANUS TOXOID, REDUCED DIPHTHERIA TOXOID AND ACELLULAR PERTUSSIS VACCINE, ADSORBED 5; 2.5; 8; 8; 2.5 [IU]/.5ML; [IU]/.5ML; UG/.5ML; UG/.5ML; UG/.5ML
SUSPENSION INTRAMUSCULAR
COMMUNITY
Start: 2019-02-20 | End: 2019-09-06 | Stop reason: ALTCHOICE
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
## 2019-02-27 NOTE — ASSESSMENT & PLAN NOTE
Imaging reviewed with patient.  Elevation has improved over time.  Patient is concerned about the need for a phrenic stimulator.  As his diaphragm position and PFTs have improved over time a surgical intervention is not recommended at this time.  Patient is encouraged to follow up with us if symptoms worsen or with any questions.

## 2019-02-27 NOTE — ASSESSMENT & PLAN NOTE
Patient does not report having to use inhaler more often or experiencing nighttime symptoms.  Will not adjust medication regimen at this time.

## 2019-03-02 DIAGNOSIS — G47.00 INSOMNIA, UNSPECIFIED TYPE: ICD-10-CM

## 2019-03-04 RX ORDER — TRAZODONE HYDROCHLORIDE 50 MG/1
TABLET ORAL
Qty: 60 TABLET | Refills: 0 | Status: SHIPPED | OUTPATIENT
Start: 2019-03-04 | End: 2019-08-21 | Stop reason: SDUPTHER

## 2019-03-14 DIAGNOSIS — G47.19 EXCESSIVE DAYTIME SLEEPINESS: Primary | ICD-10-CM

## 2019-03-14 RX ORDER — MODAFINIL 200 MG/1
TABLET ORAL
Qty: 30 TABLET | Refills: 5 | Status: SHIPPED | OUTPATIENT
Start: 2019-03-14 | End: 2019-03-18

## 2019-03-14 NOTE — TELEPHONE ENCOUNTER
modafinil (PROVIGIL) 200 MG Tab             Summary: TAKE 1 TABLET BY MOUTH TWICE A DAY AS NEEDED FOR EXCESSIVE DAYTIME SLEEPINESS TAKE NO LATER THAN 2PM   Start: 12/26/2018  Ord/Sold: 1/14/2019 (O)  Report  Adh:   Long-term:   [id number] [address]  Med Dose History       Patient Sig: TAKE 1 TABLET BY MOUTH TWICE A DAY AS NEEDED FOR EXCESSIVE DAYTIME SLEEPINESS TAKE NO LATER THAN 2PM       Ordered on: 1/14/2019       Authorized by: PROVIDER, HISTORICAL       Refills: 5 ordered      Last office visit: 2/19/2019

## 2019-03-18 ENCOUNTER — TELEPHONE (OUTPATIENT)
Dept: SLEEP MEDICINE | Facility: CLINIC | Age: 64
End: 2019-03-18

## 2019-03-18 DIAGNOSIS — G47.19 EXCESSIVE DAYTIME SLEEPINESS: ICD-10-CM

## 2019-03-18 RX ORDER — MODAFINIL 200 MG/1
TABLET ORAL
Qty: 60 TABLET | Refills: 5 | Status: SHIPPED | OUTPATIENT
Start: 2019-03-18 | End: 2021-02-17

## 2019-03-18 NOTE — TELEPHONE ENCOUNTER
----- Message from Cookie Tipton sent at 3/18/2019  2:45 PM CDT -----  Name of Who is Calling: Ashely SANTIZO      What is the request in detail:Trying to get script for modafinil (PROVIGIL) 200 MG Tab changed to 60 tabs so pt wont' have to come back every 15 days and pay double copay      Can the clinic reply by [facility]:   No       What Number to Call Back if not in [facility]: [phone]

## 2019-03-18 NOTE — TELEPHONE ENCOUNTER
I will update the prescription - pending insurance approval      --_____        Patient Calls     Olvin Fink MA   You 26 minutes ago (3:01 PM)      Dr. LARA   Can you please change pt's script so he doesn't have to come every fifteen days, according to [facility]?    Routing Comment       Olvin Fink MA 27 minutes ago (3:00 PM)         ----- Message from Cookie Tipton sent at 3/18/2019  2:45 PM CDT -----  Name of Who is Calling: Ashely SANTIZO        What is the request in detail:Trying to get script for modafinil (PROVIGIL) 200 MG Tab changed to 60 tabs so pt wont' have to come back every 15 days and pay double copay        Can the clinic reply by [facility]   No         What Number [facility]: [phone]            Documentation

## 2019-03-20 ENCOUNTER — OFFICE VISIT (OUTPATIENT)
Dept: INTERNAL MEDICINE | Facility: CLINIC | Age: 64
End: 2019-03-20
Payer: COMMERCIAL

## 2019-03-20 VITALS
HEIGHT: 73 IN | WEIGHT: 205 LBS | SYSTOLIC BLOOD PRESSURE: 136 MMHG | TEMPERATURE: 99 F | HEART RATE: 72 BPM | BODY MASS INDEX: 27.17 KG/M2 | DIASTOLIC BLOOD PRESSURE: 78 MMHG

## 2019-03-20 DIAGNOSIS — I10 ESSENTIAL HYPERTENSION: Primary | ICD-10-CM

## 2019-03-20 PROCEDURE — 3078F PR MOST RECENT DIASTOLIC BLOOD PRESSURE < 80 MM HG: ICD-10-PCS | Mod: CPTII,S$GLB,, | Performed by: FAMILY MEDICINE

## 2019-03-20 PROCEDURE — 99213 PR OFFICE/OUTPT VISIT, EST, LEVL III, 20-29 MIN: ICD-10-PCS | Mod: S$GLB,,, | Performed by: FAMILY MEDICINE

## 2019-03-20 PROCEDURE — 99999 PR PBB SHADOW E&M-EST. PATIENT-LVL III: CPT | Mod: PBBFAC,,, | Performed by: FAMILY MEDICINE

## 2019-03-20 PROCEDURE — 99213 OFFICE O/P EST LOW 20 MIN: CPT | Mod: S$GLB,,, | Performed by: FAMILY MEDICINE

## 2019-03-20 PROCEDURE — 3078F DIAST BP <80 MM HG: CPT | Mod: CPTII,S$GLB,, | Performed by: FAMILY MEDICINE

## 2019-03-20 PROCEDURE — 3008F BODY MASS INDEX DOCD: CPT | Mod: CPTII,S$GLB,, | Performed by: FAMILY MEDICINE

## 2019-03-20 PROCEDURE — 99999 PR PBB SHADOW E&M-EST. PATIENT-LVL III: ICD-10-PCS | Mod: PBBFAC,,, | Performed by: FAMILY MEDICINE

## 2019-03-20 PROCEDURE — 3075F SYST BP GE 130 - 139MM HG: CPT | Mod: CPTII,S$GLB,, | Performed by: FAMILY MEDICINE

## 2019-03-20 PROCEDURE — 3008F PR BODY MASS INDEX (BMI) DOCUMENTED: ICD-10-PCS | Mod: CPTII,S$GLB,, | Performed by: FAMILY MEDICINE

## 2019-03-20 PROCEDURE — 3075F PR MOST RECENT SYSTOLIC BLOOD PRESS GE 130-139MM HG: ICD-10-PCS | Mod: CPTII,S$GLB,, | Performed by: FAMILY MEDICINE

## 2019-03-20 NOTE — PROGRESS NOTES
Subjective:       Patient ID: Dominguez Zapata is a 63 y.o. male.    Chief Complaint: Follow-up (1m f/u HTN)    HPI 63-year-old white male returns to clinic today for hypertension re-evaluation.  He has previously been well controlled on lisinopril 40 mg daily; however, medication was discontinued last month secondary to recent study revealing an increased correlation with Ace inhibitors and lung cancer.  The patient was started on losartan 100 mg daily.  He returns today without complaints.  Review of Systems   Constitutional: Negative for appetite change, chills, fatigue and fever.   HENT: Negative for congestion, ear pain, hearing loss, postnasal drip, rhinorrhea, sinus pressure, sore throat and tinnitus.    Eyes: Negative for redness, itching and visual disturbance.   Respiratory: Negative for cough, chest tightness and shortness of breath.    Cardiovascular: Negative for chest pain and palpitations.   Gastrointestinal: Negative for abdominal pain, constipation, diarrhea, nausea and vomiting.   Genitourinary: Negative for decreased urine volume, difficulty urinating, dysuria, frequency, hematuria and urgency.   Musculoskeletal: Negative for back pain, myalgias, neck pain and neck stiffness.   Skin: Negative for rash.   Neurological: Negative for dizziness, light-headedness and headaches.   Psychiatric/Behavioral: Negative.        Objective:      Physical Exam   Constitutional: He is oriented to person, place, and time. He appears well-developed and well-nourished. No distress.   HENT:   Head: Normocephalic and atraumatic.   Right Ear: External ear normal.   Left Ear: External ear normal.   Nose: Nose normal.   Mouth/Throat: Oropharynx is clear and moist. No oropharyngeal exudate.   Eyes: Conjunctivae and EOM are normal. Pupils are equal, round, and reactive to light. Right eye exhibits no discharge. Left eye exhibits no discharge. No scleral icterus.   Neck: Normal range of motion. Neck supple. No JVD  present. No tracheal deviation present. No thyromegaly present.   Cardiovascular: Normal rate, regular rhythm, normal heart sounds and intact distal pulses. Exam reveals no gallop and no friction rub.   No murmur heard.  Pulmonary/Chest: Effort normal and breath sounds normal. No stridor. No respiratory distress. He has no wheezes. He has no rales.   Abdominal: Soft. Bowel sounds are normal. He exhibits no distension and no mass. There is no tenderness. There is no rebound and no guarding.   Musculoskeletal: Normal range of motion. He exhibits no edema or tenderness.   Lymphadenopathy:     He has no cervical adenopathy.   Neurological: He is alert and oriented to person, place, and time.   Skin: Skin is warm and dry. No rash noted. He is not diaphoretic. No erythema. No pallor.   Psychiatric: He has a normal mood and affect. His behavior is normal. Judgment and thought content normal.   Nursing note and vitals reviewed.      Assessment:       1. Essential hypertension        Plan:       1.  Continue losartan 100 mg daily.  Hypertension is well controlled.  2.  Return to clinic as needed or in 11 months for annual exam.

## 2019-05-03 ENCOUNTER — PATIENT MESSAGE (OUTPATIENT)
Dept: INTERNAL MEDICINE | Facility: CLINIC | Age: 64
End: 2019-05-03

## 2019-05-03 DIAGNOSIS — Z11.3 SCREEN FOR STD (SEXUALLY TRANSMITTED DISEASE): Primary | ICD-10-CM

## 2019-05-07 ENCOUNTER — OFFICE VISIT (OUTPATIENT)
Dept: CARDIOLOGY | Facility: CLINIC | Age: 64
End: 2019-05-07
Payer: COMMERCIAL

## 2019-05-07 ENCOUNTER — LAB VISIT (OUTPATIENT)
Dept: LAB | Facility: HOSPITAL | Age: 64
End: 2019-05-07
Attending: FAMILY MEDICINE
Payer: COMMERCIAL

## 2019-05-07 VITALS
SYSTOLIC BLOOD PRESSURE: 138 MMHG | HEART RATE: 53 BPM | DIASTOLIC BLOOD PRESSURE: 81 MMHG | WEIGHT: 201.5 LBS | BODY MASS INDEX: 26.71 KG/M2 | HEIGHT: 73 IN

## 2019-05-07 DIAGNOSIS — Z11.3 SCREEN FOR STD (SEXUALLY TRANSMITTED DISEASE): ICD-10-CM

## 2019-05-07 DIAGNOSIS — I10 ESSENTIAL HYPERTENSION: ICD-10-CM

## 2019-05-07 DIAGNOSIS — E78.5 HYPERLIPIDEMIA, UNSPECIFIED HYPERLIPIDEMIA TYPE: ICD-10-CM

## 2019-05-07 DIAGNOSIS — I25.10 CORONARY ARTERY DISEASE DUE TO CALCIFIED CORONARY LESION: Primary | ICD-10-CM

## 2019-05-07 DIAGNOSIS — I77.810 ASCENDING AORTA DILATATION: ICD-10-CM

## 2019-05-07 DIAGNOSIS — I25.84 CORONARY ARTERY DISEASE DUE TO CALCIFIED CORONARY LESION: Primary | ICD-10-CM

## 2019-05-07 PROCEDURE — 99999 PR PBB SHADOW E&M-EST. PATIENT-LVL III: ICD-10-PCS | Mod: PBBFAC,,, | Performed by: INTERNAL MEDICINE

## 2019-05-07 PROCEDURE — 3075F PR MOST RECENT SYSTOLIC BLOOD PRESS GE 130-139MM HG: ICD-10-PCS | Mod: CPTII,S$GLB,, | Performed by: INTERNAL MEDICINE

## 2019-05-07 PROCEDURE — 3008F PR BODY MASS INDEX (BMI) DOCUMENTED: ICD-10-PCS | Mod: CPTII,S$GLB,, | Performed by: INTERNAL MEDICINE

## 2019-05-07 PROCEDURE — 99213 OFFICE O/P EST LOW 20 MIN: CPT | Mod: S$GLB,,, | Performed by: INTERNAL MEDICINE

## 2019-05-07 PROCEDURE — 86703 HIV-1/HIV-2 1 RESULT ANTBDY: CPT

## 2019-05-07 PROCEDURE — 86696 HERPES SIMPLEX TYPE 2 TEST: CPT

## 2019-05-07 PROCEDURE — 99999 PR PBB SHADOW E&M-EST. PATIENT-LVL III: CPT | Mod: PBBFAC,,, | Performed by: INTERNAL MEDICINE

## 2019-05-07 PROCEDURE — 99213 PR OFFICE/OUTPT VISIT, EST, LEVL III, 20-29 MIN: ICD-10-PCS | Mod: S$GLB,,, | Performed by: INTERNAL MEDICINE

## 2019-05-07 PROCEDURE — 3008F BODY MASS INDEX DOCD: CPT | Mod: CPTII,S$GLB,, | Performed by: INTERNAL MEDICINE

## 2019-05-07 PROCEDURE — 86694 HERPES SIMPLEX NES ANTBDY: CPT

## 2019-05-07 PROCEDURE — 80074 ACUTE HEPATITIS PANEL: CPT

## 2019-05-07 PROCEDURE — 3079F DIAST BP 80-89 MM HG: CPT | Mod: CPTII,S$GLB,, | Performed by: INTERNAL MEDICINE

## 2019-05-07 PROCEDURE — 3075F SYST BP GE 130 - 139MM HG: CPT | Mod: CPTII,S$GLB,, | Performed by: INTERNAL MEDICINE

## 2019-05-07 PROCEDURE — 86592 SYPHILIS TEST NON-TREP QUAL: CPT

## 2019-05-07 PROCEDURE — 3079F PR MOST RECENT DIASTOLIC BLOOD PRESSURE 80-89 MM HG: ICD-10-PCS | Mod: CPTII,S$GLB,, | Performed by: INTERNAL MEDICINE

## 2019-05-07 NOTE — PROGRESS NOTES
Subjective:   Patient ID:  Dominguez Zapata is a 63 y.o. male who presents for evaluation of Elevated heart rate (with exertion, x 5-6 months)      HPI: Back for routine f/u.  Concerned about his exercise capacity.  No angina, no palpitations, no syncope, but he notes that his heart rate jumps quite high with exercise.    Viktoria Avendano' 2018 HPI:   Mr. Zapata is a 63yo male with HTN, HLD, history of stroke (cavernous malformation) here for follow up.  He says that he is experiencing SOB while running his usual distance which has been worsening since November. He went to urgent care and his CXR was clear. He also says that he has noticed his HR increasing faster with activity. He brought in a BP log and his blood pressures have been elevated over the past 1-2 weeks, averaging 160s-170s/. He says that he is frequently nauseated which is concerning for him since he was also nauseated during his stroke in 2013. Mr. Zapata denies chest pain with exertion or at rest, palpitations, syncope, leg edema, claudication, PND, or orthopnea.     He is taking lisinopril 40mg and guanfacine 1mg in the am and 2mg at night (taken for HTN and ADHD). Creatinine is 1.3. K is 4.3. Hepatic transaminases are within normal limits. HA1C is 6.4. .6 on 1/19/2018. ASCVD risk 34.1%. CACS from 7/24/2015 was 35.3.      Past Medical History:   Diagnosis Date    Colon polyps     Hearing loss in right ear     Low serum testosterone level     Migraine     Mixed hyperlipidemia     Hyperlipidemia    NS (nuclear sclerosis) 3/10/2015    Prediabetes     Stroke     Unspecified essential hypertension     Essential hypertension       Past Surgical History:   Procedure Laterality Date    BONE ANCHOR HEARING AID (BAHA) Right 6/23/2015    Performed by Alfredo Wilde [facility] at [facility] OR 2ND FLR    bone anchored hearing aid Right 06/23/2015    COLONOSCOPY      COLONOSCOPY N/A 9/27/2013    Performed by Tomer Bradley [facility] at  [facility] ([facility])    HERNIA REPAIR      PROSTATE BIOPSY      PROSTATE BIOPSY  1/22/10    PSA 4.12, negative for malignancy, some chronic inflammation noted    PROSTATE SURGERY      Urolift       Social History     Tobacco Use    Smoking status: Never Smoker    Smokeless tobacco: Never Used   Substance Use Topics    Alcohol use: Yes    Drug use: No     Comment: In his 20'S       Family History   Problem Relation Age of Onset    Heart disease Mother     Diabetes Mother     Kidney disease Mother         Dialysis    Hypertension Mother     Diabetes Father     Heart disease Father         Valvular disease    Hypertension Father     Amblyopia Neg Hx     Blindness Neg Hx     Cataracts Neg Hx     Glaucoma Neg Hx     Macular degeneration Neg Hx     Retinal detachment Neg Hx     Strabismus Neg Hx     Asthma Neg Hx     Emphysema Neg Hx        Current Outpatient Medications   Medication Sig    albuterol 90 mcg/actuation inhaler Inhale 2 puffs into the lungs every 4 (four) hours as needed for Shortness of Breath. Rescue    CHOLECALCIFEROL, VITAMIN D3, (VITAMIN D3 ORAL) Take 2,000 Units by mouth once daily.     doxycycline (VIBRA-TABS) 100 MG tablet Take 1 tablet (100 mg total) by mouth once daily.    flash glucose scanning reader ([employer]YLE DENNY 14 DAY READER) Misc Use as directed    flash glucose sensor (FREESTYLE DENNY 14 DAY SENSOR) Kit Use as directed.  One sensor every 14 days.    GLUC GONZALEZ/CHONDRO GONZALEZ A/VIT C/MN (GLUCOSAMINE CHONDROITIN MAXSTR ORAL) Take 1 tablet by mouth 2 (two) times daily.    losartan (COZAAR) 100 MG tablet Take 1 tablet (100 mg total) by mouth once daily.    MAGNESIUM ORAL Take 1 tablet by mouth once daily.    metFORMIN (GLUCOPHAGE) 500 MG tablet Take 1 tablet (500 mg total) by mouth 2 (two) times daily with meals.    modafinil (PROVIGIL) 200 MG Tab TAKE 1 TABLET BY MOUTH TWICE A DAY AS NEEDED FOR EXCESSIVE DAYTIME SLEEPINESS TAKE NO LATER THAN 2PM    niacin 500 MG  CpSR Take 250 mg by mouth every evening.    oxybutynin (DITROPAN) 5 MG Tab Take 1 tablet (5 mg total) by mouth every evening.    tadalafil (CIALIS) 5 MG tablet Take 1 tablet (5 mg total) by mouth once daily.    testosterone cypionate (DEPOTESTOTERONE CYPIONATE) 200 mg/mL injection INJECT 1.5 MLS (300 MG TOTAL) INTO THE MUSCLE EVERY 14 (FOURTEEN) DAYS.    traZODone (DESYREL) 50 MG tablet TAKE 1 TABLET BY MOUTH FOR INSOMNIA AT BEDTIME. OK TO TAKE 2ND PILL IN 30 MINUTES IF STILL AWAKE.    UBIDECARENONE (COENZYME Q10 ORAL) Take 1 tablet by mouth once daily.    valACYclovir (VALTREX) 1000 MG tablet Take 1,000 mg by mouth once daily.     VITAMIN K2 ORAL Take 1 tablet by mouth once daily.    BOOSTRIX TDAP 2.5-8-5 Lf-mcg-Lf/0.5mL Syrg injection     guanFACINE 1 mg Tb24 TAKE 1 TABLET BY MOUTH ONCE DAILY.     No current facility-administered medications for this visit.        Review of patient's allergies indicates:  No Known Allergies    Review of Systems   Constitution: Negative.   HENT: Negative.    Eyes: Negative.    Cardiovascular: Negative.  Negative for chest pain, dyspnea on exertion, near-syncope, orthopnea and palpitations.   Respiratory: Negative.  Negative for cough, hemoptysis and shortness of breath.    Endocrine: Negative.    Hematologic/Lymphatic: Negative.    Skin: Negative.    Musculoskeletal: Negative.    Gastrointestinal: Negative.    Genitourinary: Negative.    Neurological: Negative.    Psychiatric/Behavioral: Negative.      Objective:   Physical Exam   Constitutional: He is oriented to person, place, and time. He appears well-developed and well-nourished.   HENT:   Head: Normocephalic and atraumatic.   Mouth/Throat: Oropharynx is clear and moist.   Eyes: Conjunctivae and EOM are normal. No scleral icterus.   Neck: Normal range of motion. Neck supple. No JVD present.   Cardiovascular: Normal rate, regular rhythm, normal heart sounds and intact distal pulses. Exam reveals no gallop and no  friction rub.   No murmur heard.  Prominent, dynamic PMI, slightly inferiorly displaced.   Pulmonary/Chest: Effort normal and breath sounds normal. He has no wheezes. He has no rales.   Abdominal: Soft. Bowel sounds are normal. He exhibits no distension. There is no tenderness.   Musculoskeletal: Normal range of motion. He exhibits no edema.   Neurological: He is alert and oriented to person, place, and time.   Skin: Skin is warm and dry. No rash noted. No erythema.   Psychiatric: He has a normal mood and affect. His behavior is normal. Judgment and thought content normal.   Vitals reviewed.      Lab Results   Component Value Date    WBC 5.64 02/18/2019    HGB 15.9 02/18/2019    HCT 48.0 02/18/2019    MCV 97 02/18/2019     02/18/2019         Chemistry        Component Value Date/Time     02/18/2019 0920    K 4.2 02/18/2019 0920     02/18/2019 0920    CO2 28 02/18/2019 0920    BUN 11 02/18/2019 0920    CREATININE 1.0 02/18/2019 0920     (H) 02/18/2019 0920        Component Value Date/Time    CALCIUM 9.1 02/18/2019 0920    ALKPHOS 50 (L) 02/18/2019 0920    AST 27 02/18/2019 0920    ALT 21 02/18/2019 0920    BILITOT 1.1 (H) 02/18/2019 0920    ESTGFRAFRICA >60.0 02/18/2019 0920    EGFRNONAA >60.0 02/18/2019 0920            Lab Results   Component Value Date    CHOL 198 02/18/2019    CHOL 204 (H) 01/22/2019    CHOL 200 (H) 03/08/2018     Lab Results   Component Value Date    HDL 50 02/18/2019    HDL 53 01/22/2019    HDL 39 (L) 03/08/2018     Lab Results   Component Value Date    LDLCALC 133.2 02/18/2019    LDLCALC 136.8 01/22/2019    LDLCALC 125.6 03/08/2018     Lab Results   Component Value Date    TRIG 74 02/18/2019    TRIG 71 01/22/2019    TRIG 177 (H) 03/08/2018     Lab Results   Component Value Date    CHOLHDL 25.3 02/18/2019    CHOLHDL 26.0 01/22/2019    CHOLHDL 19.5 (L) 03/08/2018       Lab Results   Component Value Date    TSH 0.755 02/18/2019       Lab Results   Component Value Date     HGBA1C 5.8 (H) 02/18/2019         Assessment:     1. Coronary artery disease due to calcified coronary lesion    2. Essential hypertension    3. Ascending aorta dilatation    4. Hyperlipidemia, unspecified hyperlipidemia type        Plan:     Stress echo at his request.      Continue current medicines.    Diet/exercise goals reinforced.    F/U 12 months

## 2019-05-08 ENCOUNTER — HOSPITAL ENCOUNTER (OUTPATIENT)
Dept: CARDIOLOGY | Facility: CLINIC | Age: 64
Discharge: HOME | End: 2019-05-08
Attending: INTERNAL MEDICINE
Payer: COMMERCIAL

## 2019-05-08 VITALS
HEIGHT: 73 IN | DIASTOLIC BLOOD PRESSURE: 93 MMHG | WEIGHT: 201 LBS | HEART RATE: 61 BPM | SYSTOLIC BLOOD PRESSURE: 148 MMHG | BODY MASS INDEX: 26.64 KG/M2

## 2019-05-08 DIAGNOSIS — I25.84 CORONARY ARTERY DISEASE DUE TO CALCIFIED CORONARY LESION: ICD-10-CM

## 2019-05-08 DIAGNOSIS — I25.10 CORONARY ARTERY DISEASE DUE TO CALCIFIED CORONARY LESION: ICD-10-CM

## 2019-05-08 DIAGNOSIS — I77.810 ASCENDING AORTA DILATATION: ICD-10-CM

## 2019-05-08 LAB
ASCENDING AORTA: 4 CM
BSA FOR ECHO PROCEDURE: 2.17 M2
CV ECHO LV RWT: 0.43 CM
CV STRESS BASE HR: 61 BPM
DIASTOLIC BLOOD PRESSURE: 93 MMHG
DOP CALC LVOT AREA: 4.99 CM2
DOP CALC LVOT DIAMETER: 2.52 CM
DOP CALC LVOT PEAK VEL: 1.34 M/S
DOP CALC LVOT STROKE VOLUME: 137.54 CM3
DOP CALCLVOT PEAK VEL VTI: 27.59 CM
E WAVE DECELERATION TIME: 239.48 MSEC
E/A RATIO: 0.96
E/E' RATIO: 8
ECHO LV POSTERIOR WALL: 1.06 CM (ref 0.6–1.1)
FRACTIONAL SHORTENING: 36 % (ref 28–44)
HAV IGM SERPL QL IA: NEGATIVE
HBV CORE IGM SERPL QL IA: NEGATIVE
HBV SURFACE AG SERPL QL IA: NEGATIVE
HCV AB SERPL QL IA: NEGATIVE
HIV 1+2 AB+HIV1 P24 AG SERPL QL IA: NEGATIVE
INTERVENTRICULAR SEPTUM: 1.13 CM (ref 0.6–1.1)
IVRT: 0.1 MSEC
LA MAJOR: 5.62 CM
LA MINOR: 5 CM
LA WIDTH: 4.2 CM
LEFT ATRIUM SIZE: 4.13 CM
LEFT ATRIUM VOLUME INDEX: 36.2 ML/M2
LEFT ATRIUM VOLUME: 78.02 CM3
LEFT INTERNAL DIMENSION IN SYSTOLE: 3.15 CM (ref 2.1–4)
LEFT VENTRICLE DIASTOLIC VOLUME INDEX: 53.29 ML/M2
LEFT VENTRICLE DIASTOLIC VOLUME: 114.92 ML
LEFT VENTRICLE MASS INDEX: 93.6 G/M2
LEFT VENTRICLE SYSTOLIC VOLUME INDEX: 18.3 ML/M2
LEFT VENTRICLE SYSTOLIC VOLUME: 39.41 ML
LEFT VENTRICULAR INTERNAL DIMENSION IN DIASTOLE: 4.94 CM (ref 3.5–6)
LEFT VENTRICULAR MASS: 201.87 G
LV LATERAL E/E' RATIO: 6.4
LV SEPTAL E/E' RATIO: 10.67
MV PEAK A VEL: 0.67 M/S
MV PEAK E VEL: 0.64 M/S
OHS CV CPX 1 MINUTE RECOVERY HEART RATE: 120 BPM
OHS CV CPX 85 PERCENT MAX PREDICTED HEART RATE MALE: 133
OHS CV CPX ESTIMATED METS: 17
OHS CV CPX MAX PREDICTED HEART RATE: 156
OHS CV CPX PATIENT IS FEMALE: 0
OHS CV CPX PATIENT IS MALE: 1
OHS CV CPX PEAK DIASTOLIC BLOOD PRESSURE: 112 MMHG
OHS CV CPX PEAK HEAR RATE: 146 BPM
OHS CV CPX PEAK RATE PRESSURE PRODUCT: ABNORMAL
OHS CV CPX PEAK SYSTOLIC BLOOD PRESSURE: 209 MMHG
OHS CV CPX PERCENT MAX PREDICTED HEART RATE ACHIEVED: 94
OHS CV CPX PERCENT TARGET HEART RATE ACHIEVED: 110.61
OHS CV CPX RATE PRESSURE PRODUCT PRESENTING: 9028
OHS CV CPX TARGET HEART RATE: 132
PISA TR MAX VEL: 2.64 M/S
PULM VEIN S/D RATIO: 1.3
PV PEAK D VEL: 0.44 M/S
PV PEAK S VEL: 0.57 M/S
RA MAJOR: 5.43 CM
RA PRESSURE: 3 MMHG
RA WIDTH: 4.33 CM
RIGHT VENTRICULAR END-DIASTOLIC DIMENSION: 4.7 CM
RPR SER QL: NORMAL
RV TISSUE DOPPLER FREE WALL SYSTOLIC VELOCITY 1 (APICAL 4 CHAMBER VIEW): 16.23 M/S
SINUS: 4.49 CM
STRESS ECHO POST EXERCISE DUR MIN: 10 MIN
STRESS ECHO POST EXERCISE DUR SEC: 10
SYSTOLIC BLOOD PRESSURE: 148 MMHG
TDI LATERAL: 0.1
TDI SEPTAL: 0.06
TDI: 0.08
TR MAX PG: 27.88 MMHG
TRICUSPID ANNULAR PLANE SYSTOLIC EXCURSION: 2.98 CM
TV REST PULMONARY ARTERY PRESSURE: 31 MMHG

## 2019-05-08 PROCEDURE — 93351 ECHOCARDIOGRAM STRESS TEST (CUPID ONLY): ICD-10-PCS | Mod: S$GLB,,, | Performed by: INTERNAL MEDICINE

## 2019-05-08 PROCEDURE — 93351 STRESS TTE COMPLETE: CPT | Mod: S$GLB,,, | Performed by: INTERNAL MEDICINE

## 2019-05-09 ENCOUNTER — TELEPHONE (OUTPATIENT)
Dept: CARDIOLOGY | Facility: CLINIC | Age: 64
End: 2019-05-09

## 2019-05-09 LAB — HSV AB, IGM BY EIA: NEGATIVE

## 2019-05-10 LAB
HSV1 IGG SERPL QL IA: NEGATIVE
HSV2 IGG SERPL QL IA: POSITIVE

## 2019-05-20 ENCOUNTER — LAB VISIT (OUTPATIENT)
Dept: LAB | Facility: HOSPITAL | Age: 64
End: 2019-05-20
Attending: FAMILY MEDICINE
Payer: COMMERCIAL

## 2019-05-20 DIAGNOSIS — R73.03 PREDIABETES: ICD-10-CM

## 2019-05-20 LAB
ALBUMIN SERPL BCP-MCNC: 4 G/DL (ref 3.5–5.2)
ALP SERPL-CCNC: 47 U/L (ref 55–135)
ALT SERPL W/O P-5'-P-CCNC: 25 U/L (ref 10–44)
ANION GAP SERPL CALC-SCNC: 8 MMOL/L (ref 8–16)
AST SERPL-CCNC: 33 U/L (ref 10–40)
BILIRUB SERPL-MCNC: 0.9 MG/DL (ref 0.1–1)
BUN SERPL-MCNC: 13 MG/DL (ref 8–23)
CALCIUM SERPL-MCNC: 9 MG/DL (ref 8.7–10.5)
CHLORIDE SERPL-SCNC: 106 MMOL/L (ref 95–110)
CO2 SERPL-SCNC: 27 MMOL/L (ref 23–29)
CREAT SERPL-MCNC: 1 MG/DL (ref 0.5–1.4)
EST. GFR  (AFRICAN AMERICAN): >60 ML/MIN/1.73 M^2
EST. GFR  (NON AFRICAN AMERICAN): >60 ML/MIN/1.73 M^2
ESTIMATED AVG GLUCOSE: 137 MG/DL (ref 68–131)
GLUCOSE SERPL-MCNC: 107 MG/DL (ref 70–110)
HBA1C MFR BLD HPLC: 6.4 % (ref 4–5.6)
POTASSIUM SERPL-SCNC: 4.4 MMOL/L (ref 3.5–5.1)
PROT SERPL-MCNC: 6.9 G/DL (ref 6–8.4)
SODIUM SERPL-SCNC: 141 MMOL/L (ref 136–145)

## 2019-05-20 PROCEDURE — 83036 HEMOGLOBIN GLYCOSYLATED A1C: CPT

## 2019-05-20 PROCEDURE — 36415 COLL VENOUS BLD VENIPUNCTURE: CPT | Mod: PO

## 2019-05-20 PROCEDURE — 80053 COMPREHEN METABOLIC PANEL: CPT

## 2019-07-30 ENCOUNTER — PATIENT MESSAGE (OUTPATIENT)
Dept: ENDOSCOPY | Facility: HOSPITAL | Age: 64
End: 2019-07-30

## 2019-07-31 RX ORDER — VALACYCLOVIR HYDROCHLORIDE 1 G/1
TABLET, FILM COATED ORAL
Qty: 90 TABLET | Refills: 2 | Status: SHIPPED | OUTPATIENT
Start: 2019-07-31 | End: 2021-02-17 | Stop reason: SDUPTHER

## 2019-08-21 DIAGNOSIS — G47.00 INSOMNIA, UNSPECIFIED TYPE: ICD-10-CM

## 2019-08-21 RX ORDER — TESTOSTERONE CYPIONATE 200 MG/ML
300 INJECTION, SOLUTION INTRAMUSCULAR
Qty: 10 ML | Refills: 0 | Status: SHIPPED | OUTPATIENT
Start: 2019-08-21 | End: 2019-09-06 | Stop reason: SDUPTHER

## 2019-08-21 RX ORDER — TRAZODONE HYDROCHLORIDE 50 MG/1
TABLET ORAL
Qty: 60 TABLET | Refills: 11 | Status: SHIPPED | OUTPATIENT
Start: 2019-08-21 | End: 2021-02-17 | Stop reason: SDUPTHER

## 2019-08-23 ENCOUNTER — TELEPHONE (OUTPATIENT)
Dept: SLEEP MEDICINE | Facility: CLINIC | Age: 64
End: 2019-08-23

## 2019-09-06 ENCOUNTER — OFFICE VISIT (OUTPATIENT)
Dept: CARDIOLOGY | Facility: CLINIC | Age: 64
End: 2019-09-06
Payer: COMMERCIAL

## 2019-09-06 ENCOUNTER — OFFICE VISIT (OUTPATIENT)
Dept: UROLOGY | Facility: CLINIC | Age: 64
End: 2019-09-06
Payer: COMMERCIAL

## 2019-09-06 VITALS
WEIGHT: 212 LBS | HEIGHT: 73 IN | SYSTOLIC BLOOD PRESSURE: 139 MMHG | BODY MASS INDEX: 28.1 KG/M2 | HEART RATE: 68 BPM | DIASTOLIC BLOOD PRESSURE: 90 MMHG

## 2019-09-06 VITALS
SYSTOLIC BLOOD PRESSURE: 165 MMHG | WEIGHT: 212.31 LBS | DIASTOLIC BLOOD PRESSURE: 95 MMHG | BODY MASS INDEX: 28.14 KG/M2 | HEIGHT: 73 IN | HEART RATE: 69 BPM

## 2019-09-06 DIAGNOSIS — E29.1 HYPOGONADISM MALE: ICD-10-CM

## 2019-09-06 DIAGNOSIS — I77.810 ASCENDING AORTA DILATATION: Chronic | ICD-10-CM

## 2019-09-06 DIAGNOSIS — E78.5 HYPERLIPIDEMIA, UNSPECIFIED HYPERLIPIDEMIA TYPE: ICD-10-CM

## 2019-09-06 DIAGNOSIS — G47.31 COMPLEX SLEEP APNEA SYNDROME: ICD-10-CM

## 2019-09-06 DIAGNOSIS — I25.10 CORONARY ARTERY DISEASE DUE TO CALCIFIED CORONARY LESION: ICD-10-CM

## 2019-09-06 DIAGNOSIS — I25.84 CORONARY ARTERY DISEASE DUE TO CALCIFIED CORONARY LESION: ICD-10-CM

## 2019-09-06 DIAGNOSIS — I10 ESSENTIAL HYPERTENSION: Primary | ICD-10-CM

## 2019-09-06 DIAGNOSIS — N40.1 BENIGN NON-NODULAR PROSTATIC HYPERPLASIA WITH LOWER URINARY TRACT SYMPTOMS: Primary | ICD-10-CM

## 2019-09-06 PROCEDURE — 3008F PR BODY MASS INDEX (BMI) DOCUMENTED: ICD-10-PCS | Mod: CPTII,S$GLB,, | Performed by: UROLOGY

## 2019-09-06 PROCEDURE — 3008F BODY MASS INDEX DOCD: CPT | Mod: CPTII,S$GLB,, | Performed by: INTERNAL MEDICINE

## 2019-09-06 PROCEDURE — 1101F PT FALLS ASSESS-DOCD LE1/YR: CPT | Mod: CPTII,S$GLB,, | Performed by: UROLOGY

## 2019-09-06 PROCEDURE — 99214 PR OFFICE/OUTPT VISIT, EST, LEVL IV, 30-39 MIN: ICD-10-PCS | Mod: 25,S$GLB,, | Performed by: UROLOGY

## 2019-09-06 PROCEDURE — 3077F SYST BP >= 140 MM HG: CPT | Mod: CPTII,S$GLB,, | Performed by: INTERNAL MEDICINE

## 2019-09-06 PROCEDURE — 3080F DIAST BP >= 90 MM HG: CPT | Mod: CPTII,S$GLB,, | Performed by: INTERNAL MEDICINE

## 2019-09-06 PROCEDURE — 3080F DIAST BP >= 90 MM HG: CPT | Mod: CPTII,S$GLB,, | Performed by: UROLOGY

## 2019-09-06 PROCEDURE — 99999 PR PBB SHADOW E&M-EST. PATIENT-LVL IV: CPT | Mod: PBBFAC,,, | Performed by: INTERNAL MEDICINE

## 2019-09-06 PROCEDURE — 3080F PR MOST RECENT DIASTOLIC BLOOD PRESSURE >= 90 MM HG: ICD-10-PCS | Mod: CPTII,S$GLB,, | Performed by: INTERNAL MEDICINE

## 2019-09-06 PROCEDURE — 99999 PR PBB SHADOW E&M-EST. PATIENT-LVL IV: ICD-10-PCS | Mod: PBBFAC,,, | Performed by: INTERNAL MEDICINE

## 2019-09-06 PROCEDURE — 1101F PT FALLS ASSESS-DOCD LE1/YR: CPT | Mod: CPTII,S$GLB,, | Performed by: INTERNAL MEDICINE

## 2019-09-06 PROCEDURE — 3080F PR MOST RECENT DIASTOLIC BLOOD PRESSURE >= 90 MM HG: ICD-10-PCS | Mod: CPTII,S$GLB,, | Performed by: UROLOGY

## 2019-09-06 PROCEDURE — 99999 PR PBB SHADOW E&M-EST. PATIENT-LVL III: ICD-10-PCS | Mod: PBBFAC,,, | Performed by: UROLOGY

## 2019-09-06 PROCEDURE — 1101F PR PT FALLS ASSESS DOC 0-1 FALLS W/OUT INJ PAST YR: ICD-10-PCS | Mod: CPTII,S$GLB,, | Performed by: UROLOGY

## 2019-09-06 PROCEDURE — 81002 URINALYSIS NONAUTO W/O SCOPE: CPT | Mod: S$GLB,,, | Performed by: UROLOGY

## 2019-09-06 PROCEDURE — 99214 PR OFFICE/OUTPT VISIT, EST, LEVL IV, 30-39 MIN: ICD-10-PCS | Mod: S$GLB,,, | Performed by: INTERNAL MEDICINE

## 2019-09-06 PROCEDURE — 99214 OFFICE O/P EST MOD 30 MIN: CPT | Mod: 25,S$GLB,, | Performed by: UROLOGY

## 2019-09-06 PROCEDURE — 3008F PR BODY MASS INDEX (BMI) DOCUMENTED: ICD-10-PCS | Mod: CPTII,S$GLB,, | Performed by: INTERNAL MEDICINE

## 2019-09-06 PROCEDURE — 3077F PR MOST RECENT SYSTOLIC BLOOD PRESSURE >= 140 MM HG: ICD-10-PCS | Mod: CPTII,S$GLB,, | Performed by: INTERNAL MEDICINE

## 2019-09-06 PROCEDURE — 99999 PR PBB SHADOW E&M-EST. PATIENT-LVL III: CPT | Mod: PBBFAC,,, | Performed by: UROLOGY

## 2019-09-06 PROCEDURE — 81002 POCT URINE DIPSTICK WITHOUT MICROSCOPE: ICD-10-PCS | Mod: S$GLB,,, | Performed by: UROLOGY

## 2019-09-06 PROCEDURE — 99214 OFFICE O/P EST MOD 30 MIN: CPT | Mod: S$GLB,,, | Performed by: INTERNAL MEDICINE

## 2019-09-06 PROCEDURE — 3075F PR MOST RECENT SYSTOLIC BLOOD PRESS GE 130-139MM HG: ICD-10-PCS | Mod: CPTII,S$GLB,, | Performed by: UROLOGY

## 2019-09-06 PROCEDURE — 3075F SYST BP GE 130 - 139MM HG: CPT | Mod: CPTII,S$GLB,, | Performed by: UROLOGY

## 2019-09-06 PROCEDURE — 1101F PR PT FALLS ASSESS DOC 0-1 FALLS W/OUT INJ PAST YR: ICD-10-PCS | Mod: CPTII,S$GLB,, | Performed by: INTERNAL MEDICINE

## 2019-09-06 PROCEDURE — 3008F BODY MASS INDEX DOCD: CPT | Mod: CPTII,S$GLB,, | Performed by: UROLOGY

## 2019-09-06 RX ORDER — AMLODIPINE BESYLATE 5 MG/1
5 TABLET ORAL 2 TIMES DAILY
Qty: 180 TABLET | Refills: 3 | Status: SHIPPED | OUTPATIENT
Start: 2019-09-06 | End: 2021-02-17 | Stop reason: SDUPTHER

## 2019-09-06 RX ORDER — TESTOSTERONE CYPIONATE 200 MG/ML
300 INJECTION, SOLUTION INTRAMUSCULAR
Qty: 10 ML | Refills: 1 | Status: SHIPPED | OUTPATIENT
Start: 2019-09-06 | End: 2023-04-14 | Stop reason: SDUPTHER

## 2019-09-06 RX ORDER — ROSUVASTATIN CALCIUM 10 MG/1
10 TABLET, COATED ORAL DAILY
Qty: 90 TABLET | Refills: 3 | Status: SHIPPED | OUTPATIENT
Start: 2019-09-06 | End: 2021-02-17 | Stop reason: SDUPTHER

## 2019-09-06 RX ORDER — OXYBUTYNIN CHLORIDE 5 MG/1
5 TABLET ORAL 3 TIMES DAILY
Qty: 270 TABLET | Refills: 3 | Status: SHIPPED | OUTPATIENT
Start: 2019-09-06 | End: 2019-12-10 | Stop reason: SDUPTHER

## 2019-09-06 RX ORDER — TADALAFIL 5 MG/1
5 TABLET ORAL DAILY
Qty: 360 TABLET | Refills: 0 | Status: SHIPPED | OUTPATIENT
Start: 2019-09-06 | End: 2021-02-17 | Stop reason: SDUPTHER

## 2019-09-06 RX ORDER — TADALAFIL 5 MG/1
5 TABLET ORAL DAILY
Qty: 360 TABLET | Refills: 0 | Status: SHIPPED | OUTPATIENT
Start: 2019-09-06 | End: 2019-09-06 | Stop reason: SDUPTHER

## 2019-09-06 NOTE — PROGRESS NOTES
Subjective:   Patient ID:  Dominguez Zapata is a 64 y.o. male who presents for follow up of Coronary artery disease due to calcified coronary lesion (1 yr fu)      HPI: Routine f/u.  Planning to go to [place] for a year.  He is doing well with no new symptoms or cardiovascular complaints and no change in exercise capacity.  He denies chest discomfort, ALEXANDER, palpitations, PND/orthopnea, lightheadedness and syncope.    He got a full body CT scan assessment screening test in [place].  Calcium score was 56.  He had mild carotid plaque bilaterally.    Patient Active Problem List   Diagnosis    Hypertension    Hyperlipidemia    Rosacea    Vertigo    Hypogonadism male    Elevated PSA    Benign prostatic hyperplasia    Gait abnormality    External hemorrhoids    History of colonic polyps    Cavernous malformation    Prediabetes    Abnormality of vitreoretinal interface    NS (nuclear sclerosis)    Hearing loss    Ascending aorta dilatation    Attention deficit hyperactivity disorder (ADHD), combined type    Coronary artery disease due to calcified coronary lesion    Shortness of breath    Mild intermittent asthma without complication    Elevated diaphragm    Abnormal PFTs (pulmonary function tests)    Complex sleep apnea syndrome    Hemidiaphragm paralysis    Dysphonia    HSV-2 infection    Retinal tear, left    Posterior vitreous detachment, left    Vitreous hemorrhage, left    Thyroid nodule    Subclinical hyperthyroidism    Spondylosis of cervical region without myelopathy or radiculopathy    Cough       Current Outpatient Medications   Medication Sig    albuterol 90 mcg/actuation inhaler Inhale 2 puffs into the lungs every 4 (four) hours as needed for Shortness of Breath. Rescue    CHOLECALCIFEROL, VITAMIN D3, (VITAMIN D3 ORAL) Take 2,000 Units by mouth once daily.     doxycycline (VIBRA-TABS) 100 MG tablet Take 1 tablet (100 mg total) by mouth once daily.    flash glucose scanning  reader (FREESTYLE DENNY 14 DAY READER) Misc Use as directed    flash glucose sensor (FREESTYLE DENNY 14 DAY SENSOR) Kit Use as directed.  One sensor every 14 days.    GLUC GONZALEZ/CHONDRO GONZALEZ A/VIT C/MN (GLUCOSAMINE CHONDROITIN MAXSTR ORAL) Take 1 tablet by mouth 2 (two) times daily.    losartan (COZAAR) 100 MG tablet Take 1 tablet (100 mg total) by mouth once daily.    MAGNESIUM ORAL Take 1 tablet by mouth once daily.    metFORMIN (GLUCOPHAGE) 500 MG tablet Take 1 tablet (500 mg total) by mouth 2 (two) times daily with meals.    modafinil (PROVIGIL) 200 MG Tab TAKE 1 TABLET BY MOUTH TWICE A DAY AS NEEDED FOR EXCESSIVE DAYTIME SLEEPINESS TAKE NO LATER THAN 2PM    niacin 500 MG CpSR Take 250 mg by mouth every evening.    oxybutynin (DITROPAN) 5 MG Tab Take 1 tablet (5 mg total) by mouth every evening.    tadalafil (CIALIS) 5 MG tablet Take 1 tablet (5 mg total) by mouth once daily.    testosterone cypionate (DEPOTESTOTERONE CYPIONATE) 200 mg/mL injection INJECT 1.5 MLS (300 MG TOTAL) INTO THE MUSCLE EVERY 14 (FOURTEEN) DAYS.    traZODone (DESYREL) 50 MG tablet TAKE 1 TABLET BY MOUTH FOR INSOMNIA AT BEDTIME. OK TO TAKE 2ND PILL IN 30 MINUTES IF STILL AWAKE.    UBIDECARENONE (COENZYME Q10 ORAL) Take 1 tablet by mouth once daily.    valACYclovir (VALTREX) 1000 MG tablet TAKE 1 TABLET BY MOUTH EVERY DAY    VITAMIN K2 ORAL Take 1 tablet by mouth once daily.     No current facility-administered medications for this visit.        Review of Systems   Constitution: Negative.   HENT: Negative.    Eyes: Negative.    Cardiovascular: Negative.  Negative for chest pain, dyspnea on exertion, near-syncope, orthopnea and palpitations.   Respiratory: Negative.  Negative for cough, hemoptysis and shortness of breath.    Endocrine: Negative.    Hematologic/Lymphatic: Negative.    Skin: Negative.    Musculoskeletal: Negative.    Gastrointestinal: Negative.    Genitourinary: Negative.    Neurological: Negative.     Psychiatric/Behavioral: Negative.      Objective:   Physical Exam    Lab Results   Component Value Date    WBC 5.64 02/18/2019    HGB 15.9 02/18/2019    HCT 48.0 02/18/2019    MCV 97 02/18/2019     02/18/2019         Chemistry        Component Value Date/Time     05/20/2019 1113    K 4.4 05/20/2019 1113     05/20/2019 1113    CO2 27 05/20/2019 1113    BUN 13 05/20/2019 1113    CREATININE 1.0 05/20/2019 1113     05/20/2019 1113        Component Value Date/Time    CALCIUM 9.0 05/20/2019 1113    ALKPHOS 47 (L) 05/20/2019 1113    AST 33 05/20/2019 1113    ALT 25 05/20/2019 1113    BILITOT 0.9 05/20/2019 1113    ESTGFRAFRICA >60.0 05/20/2019 1113    EGFRNONAA >60.0 05/20/2019 1113            Lab Results   Component Value Date    CHOL 198 02/18/2019    CHOL 204 (H) 01/22/2019    CHOL 200 (H) 03/08/2018     Lab Results   Component Value Date    HDL 50 02/18/2019    HDL 53 01/22/2019    HDL 39 (L) 03/08/2018     Lab Results   Component Value Date    LDLCALC 133.2 02/18/2019    LDLCALC 136.8 01/22/2019    LDLCALC 125.6 03/08/2018     Lab Results   Component Value Date    TRIG 74 02/18/2019    TRIG 71 01/22/2019    TRIG 177 (H) 03/08/2018     Lab Results   Component Value Date    CHOLHDL 25.3 02/18/2019    CHOLHDL 26.0 01/22/2019    CHOLHDL 19.5 (L) 03/08/2018       Lab Results   Component Value Date    TSH 0.755 02/18/2019       Lab Results   Component Value Date    HGBA1C 6.4 (H) 05/20/2019       Assessment:     1. Essential hypertension    2. Hyperlipidemia, unspecified hyperlipidemia type    3. Coronary artery disease due to calcified coronary lesion    4. Ascending aorta dilatation    5. Complex sleep apnea syndrome        Plan:     Add Crestor 10 and amlodipine 10 (he wants 5 bid).    Continue current medicines.    Diet/exercise goals reinforced.    F/U 12 months

## 2019-09-06 NOTE — PROGRESS NOTES
"Subjective:      Dominguez Zapata is a 64 y.o. male who returns today regarding his BPH, hypogonadism, and elevated PSA.    He is currently on testosterone IM 100mg every 1 week (his preferred schedule). Previously tried both clomid and testopel but opted to use injections instead. Pre-treatment symptoms included decreased libido.     He also has h/o BPH treated w/ Urolift at [facility] in 2014. More recently he has been bothered by nocturia and started nightly ditropan last year. That worked for awhile, but he is again bothered by nocturia x2. He is also taking daily Cialis. We had scheduled repeat Urolift last year but he deferred.    He also has h/o elevated PSA, s/p biopsy in [place] in 2009 (negative) and MRI in 2015 (low probability of tumor).  His highest known PSA was 14.6 in August 2017.    The following portions of the patient's history were reviewed and updated as appropriate: allergies, current medications, past family history, past medical history, past social history, past surgical history and problem list.    Review of Systems  A comprehensive multipoint review of systems was negative except as otherwise stated in the HPI.     Objective:   Vitals: BP (!) 139/90 (BP Location: Left arm, Patient Position: Sitting, BP Method: Medium (Automatic))   Pulse 68   Ht 6' 1" (1.854 m)   Wt 96.2 kg (212 lb)   BMI 27.97 kg/m²     Physical Exam   General: alert and oriented, no acute distress  Respiratory: Symmetric expansion, non-labored breathing  Neuro: no gross deficits  Psych: normal judgment and insight, normal mood/affect and non-anxious    Bladder Scan PVR: 57cc      Lab Review   Urinalysis demonstrates negative for all components  Lab Results   Component Value Date    WBC 5.64 02/18/2019    HGB 15.9 02/18/2019    HCT 48.0 02/18/2019    MCV 97 02/18/2019     02/18/2019     Lab Results   Component Value Date    CREATININE 1.0 05/20/2019    BUN 13 05/20/2019     Component PSA Total PSA, Free PSA, Free " Pct   Latest Ref Rng & Units 0.00 - 4.00 ng/mL 0.01 - 1.50 ng/mL Not established %   2/182019 8.8 (H) 1.79 (H) 20.34   8/8/2018 8.7 (H) 1.61 (H) 18.51   2/1/2018 10.8 (H)     1/19/2018 15.4 (H)     8/8/2017 11.1 (H) 2.46 (H) 22.16   8/2/2017 14.6 (H) 2.61 (H) 17.88   1/10/2017 4.8 (H)     7/6/2016 6.8 (H)         Component Testosterone, Total   Latest Ref Rng & Units 195.0 - 1138.0 ng/dL   2/18/2019 435   1/19/2018 1099 (day 5 of cycle)   5/2/2017 446   4/13/2017 657   3/16/2017 1078       Assessment and Plan:   1. Hypogonadism male  -- Continue injections - 100mg every week    2. BPH  -- Continue ditropan qhs - OK to try 10mg  -- Continue daily Cialis  -- Consider repeat Urolift in future    3. Elevated PSA  -- Suspect benign etiology given his history, low percent free, and volatile levels  -- Will repeat prostate MRI and biopsy if indicated    FU 1 year

## 2019-11-15 RX ORDER — DOXYCYCLINE HYCLATE 100 MG
TABLET ORAL
Qty: 30 TABLET | Refills: 2 | Status: SHIPPED | OUTPATIENT
Start: 2019-11-15 | End: 2021-02-19 | Stop reason: SDUPTHER

## 2019-12-10 RX ORDER — OXYBUTYNIN CHLORIDE 5 MG/1
TABLET ORAL
Qty: 90 TABLET | Refills: 1 | Status: SHIPPED | OUTPATIENT
Start: 2019-12-10 | End: 2021-02-17 | Stop reason: SDUPTHER

## 2020-02-12 DIAGNOSIS — R73.03 PREDIABETES: ICD-10-CM

## 2020-02-12 DIAGNOSIS — Z00.00 WELL ADULT EXAM: Primary | ICD-10-CM

## 2020-02-12 DIAGNOSIS — E04.1 THYROID NODULE: ICD-10-CM

## 2020-02-12 DIAGNOSIS — N40.1 BENIGN PROSTATIC HYPERPLASIA WITH LOWER URINARY TRACT SYMPTOMS, SYMPTOM DETAILS UNSPECIFIED: ICD-10-CM

## 2020-02-12 RX ORDER — METFORMIN HYDROCHLORIDE 500 MG/1
TABLET ORAL
Qty: 90 TABLET | Refills: 0 | Status: SHIPPED | OUTPATIENT
Start: 2020-02-12 | End: 2021-02-17 | Stop reason: SDUPTHER

## 2020-02-12 NOTE — TELEPHONE ENCOUNTER
Called patient and left message on VM notifying him that his medication was approved and that he need to call in to schedule hs Annual Exam.

## 2020-10-05 ENCOUNTER — PATIENT MESSAGE (OUTPATIENT)
Dept: ADMINISTRATIVE | Facility: HOSPITAL | Age: 65
End: 2020-10-05

## 2021-01-04 ENCOUNTER — PATIENT MESSAGE (OUTPATIENT)
Dept: ADMINISTRATIVE | Facility: HOSPITAL | Age: 66
End: 2021-01-04

## 2021-02-06 ENCOUNTER — PATIENT MESSAGE (OUTPATIENT)
Dept: INTERNAL MEDICINE | Facility: CLINIC | Age: 66
End: 2021-02-06

## 2021-02-08 ENCOUNTER — TELEPHONE (OUTPATIENT)
Dept: INTERNAL MEDICINE | Facility: CLINIC | Age: 66
End: 2021-02-08

## 2021-02-08 DIAGNOSIS — E29.1 HYPOGONADISM MALE: ICD-10-CM

## 2021-02-08 DIAGNOSIS — E05.90 SUBCLINICAL HYPERTHYROIDISM: ICD-10-CM

## 2021-02-08 DIAGNOSIS — R06.02 SHORTNESS OF BREATH: ICD-10-CM

## 2021-02-08 DIAGNOSIS — E04.1 THYROID NODULE: ICD-10-CM

## 2021-02-08 DIAGNOSIS — Z86.010 HISTORY OF COLONIC POLYPS: ICD-10-CM

## 2021-02-08 DIAGNOSIS — R05.9 COUGH: ICD-10-CM

## 2021-02-08 DIAGNOSIS — I25.10 CORONARY ARTERY DISEASE DUE TO CALCIFIED CORONARY LESION: ICD-10-CM

## 2021-02-08 DIAGNOSIS — H43.9 ABNORMALITY OF VITREORETINAL INTERFACE: ICD-10-CM

## 2021-02-08 DIAGNOSIS — M47.812 SPONDYLOSIS OF CERVICAL REGION WITHOUT MYELOPATHY OR RADICULOPATHY: ICD-10-CM

## 2021-02-08 DIAGNOSIS — H35.9 ABNORMALITY OF VITREORETINAL INTERFACE: ICD-10-CM

## 2021-02-08 DIAGNOSIS — R49.0 DYSPHONIA: Chronic | ICD-10-CM

## 2021-02-08 DIAGNOSIS — I10 ESSENTIAL HYPERTENSION: ICD-10-CM

## 2021-02-08 DIAGNOSIS — I77.810 ASCENDING AORTA DILATATION: Chronic | ICD-10-CM

## 2021-02-08 DIAGNOSIS — G47.31 COMPLEX SLEEP APNEA SYNDROME: ICD-10-CM

## 2021-02-08 DIAGNOSIS — Z00.00 PREVENTATIVE HEALTH CARE: Primary | ICD-10-CM

## 2021-02-08 DIAGNOSIS — I25.84 CORONARY ARTERY DISEASE DUE TO CALCIFIED CORONARY LESION: ICD-10-CM

## 2021-02-08 DIAGNOSIS — R26.9 GAIT ABNORMALITY: ICD-10-CM

## 2021-02-08 DIAGNOSIS — R94.2 ABNORMAL PFTS (PULMONARY FUNCTION TESTS): ICD-10-CM

## 2021-02-08 DIAGNOSIS — E78.5 HYPERLIPIDEMIA, UNSPECIFIED HYPERLIPIDEMIA TYPE: ICD-10-CM

## 2021-02-08 DIAGNOSIS — R97.20 ELEVATED PSA: ICD-10-CM

## 2021-02-08 DIAGNOSIS — H43.12 VITREOUS HEMORRHAGE, LEFT: ICD-10-CM

## 2021-02-08 DIAGNOSIS — J98.6 HEMIDIAPHRAGM PARALYSIS: ICD-10-CM

## 2021-02-08 DIAGNOSIS — F90.2 ATTENTION DEFICIT HYPERACTIVITY DISORDER (ADHD), COMBINED TYPE: ICD-10-CM

## 2021-02-08 DIAGNOSIS — R73.03 PREDIABETES: ICD-10-CM

## 2021-02-08 DIAGNOSIS — L71.9 ROSACEA: ICD-10-CM

## 2021-02-08 DIAGNOSIS — Q28.3 CAVERNOUS MALFORMATION: ICD-10-CM

## 2021-02-08 DIAGNOSIS — N40.1 BENIGN PROSTATIC HYPERPLASIA WITH LOWER URINARY TRACT SYMPTOMS, SYMPTOM DETAILS UNSPECIFIED: ICD-10-CM

## 2021-02-08 DIAGNOSIS — R42 VERTIGO: ICD-10-CM

## 2021-02-08 DIAGNOSIS — H43.812 POSTERIOR VITREOUS DETACHMENT, LEFT: ICD-10-CM

## 2021-02-08 DIAGNOSIS — H91.90 HEARING LOSS, UNSPECIFIED HEARING LOSS TYPE, UNSPECIFIED LATERALITY: ICD-10-CM

## 2021-02-08 DIAGNOSIS — H25.13 NUCLEAR SCLEROSIS OF BOTH EYES: ICD-10-CM

## 2021-02-08 DIAGNOSIS — B00.9 HSV-2 INFECTION: ICD-10-CM

## 2021-02-08 DIAGNOSIS — J45.20 MILD INTERMITTENT ASTHMA WITHOUT COMPLICATION: ICD-10-CM

## 2021-02-08 DIAGNOSIS — K64.4 EXTERNAL HEMORRHOIDS: ICD-10-CM

## 2021-02-08 DIAGNOSIS — J98.6 ELEVATED DIAPHRAGM: ICD-10-CM

## 2021-02-08 DIAGNOSIS — H33.312 RETINAL TEAR, LEFT: ICD-10-CM

## 2021-02-12 ENCOUNTER — LAB VISIT (OUTPATIENT)
Dept: LAB | Facility: HOSPITAL | Age: 66
End: 2021-02-12
Attending: FAMILY MEDICINE
Payer: MEDICARE

## 2021-02-12 DIAGNOSIS — E04.1 THYROID NODULE: ICD-10-CM

## 2021-02-12 DIAGNOSIS — R73.03 PREDIABETES: ICD-10-CM

## 2021-02-12 DIAGNOSIS — Z00.00 WELL ADULT EXAM: ICD-10-CM

## 2021-02-12 DIAGNOSIS — N40.1 BENIGN PROSTATIC HYPERPLASIA WITH LOWER URINARY TRACT SYMPTOMS, SYMPTOM DETAILS UNSPECIFIED: ICD-10-CM

## 2021-02-12 LAB
25(OH)D3+25(OH)D2 SERPL-MCNC: 113 NG/ML (ref 30–96)
ALBUMIN SERPL BCP-MCNC: 4.4 G/DL (ref 3.5–5.2)
ALP SERPL-CCNC: 62 U/L (ref 55–135)
ALT SERPL W/O P-5'-P-CCNC: 27 U/L (ref 10–44)
ANION GAP SERPL CALC-SCNC: 10 MMOL/L (ref 8–16)
AST SERPL-CCNC: 32 U/L (ref 10–40)
BASOPHILS # BLD AUTO: 0.05 K/UL (ref 0–0.2)
BASOPHILS NFR BLD: 0.8 % (ref 0–1.9)
BILIRUB SERPL-MCNC: 1.1 MG/DL (ref 0.1–1)
BUN SERPL-MCNC: 15 MG/DL (ref 8–23)
CALCIUM SERPL-MCNC: 9.6 MG/DL (ref 8.7–10.5)
CHLORIDE SERPL-SCNC: 102 MMOL/L (ref 95–110)
CHOLEST SERPL-MCNC: 228 MG/DL (ref 120–199)
CHOLEST/HDLC SERPL: 3.5 {RATIO} (ref 2–5)
CO2 SERPL-SCNC: 30 MMOL/L (ref 23–29)
COMPLEXED PSA SERPL-MCNC: 11.3 NG/ML (ref 0–4)
CREAT SERPL-MCNC: 1 MG/DL (ref 0.5–1.4)
DIFFERENTIAL METHOD: ABNORMAL
EOSINOPHIL # BLD AUTO: 0.1 K/UL (ref 0–0.5)
EOSINOPHIL NFR BLD: 2 % (ref 0–8)
ERYTHROCYTE [DISTWIDTH] IN BLOOD BY AUTOMATED COUNT: 13.2 % (ref 11.5–14.5)
EST. GFR  (AFRICAN AMERICAN): >60 ML/MIN/1.73 M^2
EST. GFR  (NON AFRICAN AMERICAN): >60 ML/MIN/1.73 M^2
ESTIMATED AVG GLUCOSE: 117 MG/DL (ref 68–131)
GLUCOSE SERPL-MCNC: 113 MG/DL (ref 70–110)
HBA1C MFR BLD: 5.7 % (ref 4–5.6)
HCT VFR BLD AUTO: 48.6 % (ref 40–54)
HDLC SERPL-MCNC: 65 MG/DL (ref 40–75)
HDLC SERPL: 28.5 % (ref 20–50)
HGB BLD-MCNC: 16 G/DL (ref 14–18)
IMM GRANULOCYTES # BLD AUTO: 0.02 K/UL (ref 0–0.04)
IMM GRANULOCYTES NFR BLD AUTO: 0.3 % (ref 0–0.5)
LDLC SERPL CALC-MCNC: 141 MG/DL (ref 63–159)
LYMPHOCYTES # BLD AUTO: 1.9 K/UL (ref 1–4.8)
LYMPHOCYTES NFR BLD: 32.4 % (ref 18–48)
MCH RBC QN AUTO: 32.3 PG (ref 27–31)
MCHC RBC AUTO-ENTMCNC: 32.9 G/DL (ref 32–36)
MCV RBC AUTO: 98 FL (ref 82–98)
MONOCYTES # BLD AUTO: 0.6 K/UL (ref 0.3–1)
MONOCYTES NFR BLD: 9.2 % (ref 4–15)
NEUTROPHILS # BLD AUTO: 3.3 K/UL (ref 1.8–7.7)
NEUTROPHILS NFR BLD: 55.3 % (ref 38–73)
NONHDLC SERPL-MCNC: 163 MG/DL
NRBC BLD-RTO: 0 /100 WBC
PLATELET # BLD AUTO: 209 K/UL (ref 150–350)
PMV BLD AUTO: 11.7 FL (ref 9.2–12.9)
POTASSIUM SERPL-SCNC: 3.8 MMOL/L (ref 3.5–5.1)
PROT SERPL-MCNC: 7.8 G/DL (ref 6–8.4)
RBC # BLD AUTO: 4.96 M/UL (ref 4.6–6.2)
SODIUM SERPL-SCNC: 142 MMOL/L (ref 136–145)
T4 FREE SERPL-MCNC: 1.14 NG/DL (ref 0.71–1.51)
TRIGL SERPL-MCNC: 110 MG/DL (ref 30–150)
TSH SERPL DL<=0.005 MIU/L-ACNC: 1.3 UIU/ML (ref 0.4–4)
WBC # BLD AUTO: 5.96 K/UL (ref 3.9–12.7)

## 2021-02-12 PROCEDURE — 84439 ASSAY OF FREE THYROXINE: CPT

## 2021-02-12 PROCEDURE — 84153 ASSAY OF PSA TOTAL: CPT

## 2021-02-12 PROCEDURE — 85025 COMPLETE CBC W/AUTO DIFF WBC: CPT

## 2021-02-12 PROCEDURE — 80061 LIPID PANEL: CPT

## 2021-02-12 PROCEDURE — 84443 ASSAY THYROID STIM HORMONE: CPT

## 2021-02-12 PROCEDURE — 82306 VITAMIN D 25 HYDROXY: CPT

## 2021-02-12 PROCEDURE — 80053 COMPREHEN METABOLIC PANEL: CPT

## 2021-02-12 PROCEDURE — 83036 HEMOGLOBIN GLYCOSYLATED A1C: CPT

## 2021-02-12 PROCEDURE — 36415 COLL VENOUS BLD VENIPUNCTURE: CPT | Mod: PO

## 2021-02-17 ENCOUNTER — OFFICE VISIT (OUTPATIENT)
Dept: INTERNAL MEDICINE | Facility: CLINIC | Age: 66
End: 2021-02-17
Payer: MEDICARE

## 2021-02-17 VITALS
OXYGEN SATURATION: 96 % | DIASTOLIC BLOOD PRESSURE: 86 MMHG | SYSTOLIC BLOOD PRESSURE: 144 MMHG | WEIGHT: 202.38 LBS | RESPIRATION RATE: 18 BRPM | TEMPERATURE: 98 F | HEART RATE: 60 BPM | HEIGHT: 73 IN | BODY MASS INDEX: 26.82 KG/M2

## 2021-02-17 DIAGNOSIS — Z00.00 PREVENTATIVE HEALTH CARE: Primary | ICD-10-CM

## 2021-02-17 DIAGNOSIS — G47.19 EXCESSIVE DAYTIME SLEEPINESS: ICD-10-CM

## 2021-02-17 DIAGNOSIS — J98.6 ELEVATED DIAPHRAGM: ICD-10-CM

## 2021-02-17 DIAGNOSIS — R73.03 PREDIABETES: ICD-10-CM

## 2021-02-17 DIAGNOSIS — I25.10 CORONARY ARTERY DISEASE DUE TO CALCIFIED CORONARY LESION: ICD-10-CM

## 2021-02-17 DIAGNOSIS — I10 ESSENTIAL HYPERTENSION: ICD-10-CM

## 2021-02-17 DIAGNOSIS — N40.1 BENIGN NON-NODULAR PROSTATIC HYPERPLASIA WITH LOWER URINARY TRACT SYMPTOMS: ICD-10-CM

## 2021-02-17 DIAGNOSIS — E78.5 HYPERLIPIDEMIA, UNSPECIFIED HYPERLIPIDEMIA TYPE: ICD-10-CM

## 2021-02-17 DIAGNOSIS — Z12.12 ENCOUNTER FOR COLORECTAL CANCER SCREENING: ICD-10-CM

## 2021-02-17 DIAGNOSIS — R06.02 SHORTNESS OF BREATH: ICD-10-CM

## 2021-02-17 DIAGNOSIS — G47.00 INSOMNIA, UNSPECIFIED TYPE: ICD-10-CM

## 2021-02-17 DIAGNOSIS — E05.90 SUBCLINICAL HYPERTHYROIDISM: ICD-10-CM

## 2021-02-17 DIAGNOSIS — I25.84 CORONARY ARTERY DISEASE DUE TO CALCIFIED CORONARY LESION: ICD-10-CM

## 2021-02-17 DIAGNOSIS — J45.20 MILD INTERMITTENT ASTHMA WITHOUT COMPLICATION: ICD-10-CM

## 2021-02-17 DIAGNOSIS — Z12.11 ENCOUNTER FOR COLORECTAL CANCER SCREENING: ICD-10-CM

## 2021-02-17 DIAGNOSIS — Q28.3 CAVERNOUS MALFORMATION: ICD-10-CM

## 2021-02-17 PROCEDURE — 99999 PR PBB SHADOW E&M-EST. PATIENT-LVL V: CPT | Mod: PBBFAC,,, | Performed by: FAMILY MEDICINE

## 2021-02-17 PROCEDURE — 99215 PR OFFICE/OUTPT VISIT, EST, LEVL V, 40-54 MIN: ICD-10-PCS | Mod: S$PBB,,, | Performed by: FAMILY MEDICINE

## 2021-02-17 PROCEDURE — 99215 OFFICE O/P EST HI 40 MIN: CPT | Mod: PBBFAC,PO | Performed by: FAMILY MEDICINE

## 2021-02-17 PROCEDURE — 99999 PR PBB SHADOW E&M-EST. PATIENT-LVL V: ICD-10-PCS | Mod: PBBFAC,,, | Performed by: FAMILY MEDICINE

## 2021-02-17 PROCEDURE — 99215 OFFICE O/P EST HI 40 MIN: CPT | Mod: S$PBB,,, | Performed by: FAMILY MEDICINE

## 2021-02-17 PROCEDURE — 90694 VACC AIIV4 NO PRSRV 0.5ML IM: CPT | Mod: PBBFAC,PO

## 2021-02-17 PROCEDURE — G0008 ADMIN INFLUENZA VIRUS VAC: HCPCS | Mod: PBBFAC,PO

## 2021-02-17 RX ORDER — METFORMIN HYDROCHLORIDE 500 MG/1
500 TABLET ORAL DAILY
Qty: 90 TABLET | Refills: 3 | Status: SHIPPED | OUTPATIENT
Start: 2021-02-17 | End: 2022-02-07

## 2021-02-17 RX ORDER — ASCORBIC ACID 500 MG
500 TABLET ORAL DAILY
COMMUNITY

## 2021-02-17 RX ORDER — TADALAFIL 5 MG/1
5 TABLET ORAL DAILY
Qty: 30 TABLET | Refills: 11 | Status: SHIPPED | OUTPATIENT
Start: 2021-02-17 | End: 2023-03-23 | Stop reason: SDUPTHER

## 2021-02-17 RX ORDER — ACETAMINOPHEN, DIPHENHYDRAMINE HCL, PHENYLEPHRINE HCL 325; 25; 5 MG/1; MG/1; MG/1
TABLET ORAL
COMMUNITY
End: 2023-05-18

## 2021-02-17 RX ORDER — LANOLIN ALCOHOL/MO/W.PET/CERES
100 CREAM (GRAM) TOPICAL DAILY
COMMUNITY

## 2021-02-17 RX ORDER — FLASH GLUCOSE SENSOR
KIT MISCELLANEOUS
Qty: 2 KIT | Refills: 11 | Status: SHIPPED | OUTPATIENT
Start: 2021-02-17 | End: 2021-02-17

## 2021-02-17 RX ORDER — OXYBUTYNIN CHLORIDE 5 MG/1
5 TABLET ORAL NIGHTLY
Qty: 90 TABLET | Refills: 3 | Status: SHIPPED | OUTPATIENT
Start: 2021-02-17 | End: 2022-05-09

## 2021-02-17 RX ORDER — ROSUVASTATIN CALCIUM 10 MG/1
10 TABLET, COATED ORAL DAILY
Qty: 90 TABLET | Refills: 3 | Status: SHIPPED | OUTPATIENT
Start: 2021-02-17 | End: 2023-05-18

## 2021-02-17 RX ORDER — TRAZODONE HYDROCHLORIDE 50 MG/1
TABLET ORAL
Qty: 180 TABLET | Refills: 3 | Status: SHIPPED | OUTPATIENT
Start: 2021-02-17 | End: 2021-02-17

## 2021-02-17 RX ORDER — TRAZODONE HYDROCHLORIDE 50 MG/1
TABLET ORAL
Qty: 180 TABLET | Refills: 3 | Status: SHIPPED | OUTPATIENT
Start: 2021-02-17 | End: 2022-02-02

## 2021-02-17 RX ORDER — PYRIDOXINE HCL (VITAMIN B6) 25 MG
25 TABLET ORAL DAILY
COMMUNITY

## 2021-02-17 RX ORDER — AMLODIPINE BESYLATE 5 MG/1
5 TABLET ORAL 2 TIMES DAILY
Qty: 180 TABLET | Refills: 3 | Status: SHIPPED | OUTPATIENT
Start: 2021-02-17 | End: 2022-02-07

## 2021-02-17 RX ORDER — AMOXICILLIN 500 MG
CAPSULE ORAL DAILY
COMMUNITY

## 2021-02-17 RX ORDER — FLASH GLUCOSE SENSOR
KIT MISCELLANEOUS
Qty: 2 KIT | Refills: 11 | Status: SHIPPED | OUTPATIENT
Start: 2021-02-17 | End: 2022-03-19

## 2021-02-17 RX ORDER — LOSARTAN POTASSIUM 100 MG/1
100 TABLET ORAL DAILY
Qty: 90 TABLET | Refills: 3 | Status: SHIPPED | OUTPATIENT
Start: 2021-02-17 | End: 2022-02-07

## 2021-02-17 RX ORDER — OXYBUTYNIN CHLORIDE 5 MG/1
5 TABLET ORAL NIGHTLY
Qty: 90 TABLET | Refills: 3 | Status: SHIPPED | OUTPATIENT
Start: 2021-02-17 | End: 2021-02-17

## 2021-02-17 RX ORDER — VALACYCLOVIR HYDROCHLORIDE 1 G/1
1000 TABLET, FILM COATED ORAL DAILY
Qty: 90 TABLET | Refills: 3 | Status: SHIPPED | OUTPATIENT
Start: 2021-02-17 | End: 2022-02-07

## 2021-02-18 ENCOUNTER — PATIENT MESSAGE (OUTPATIENT)
Dept: INTERNAL MEDICINE | Facility: CLINIC | Age: 66
End: 2021-02-18

## 2021-02-18 DIAGNOSIS — R06.02 SHORTNESS OF BREATH: ICD-10-CM

## 2021-02-19 RX ORDER — DOXYCYCLINE HYCLATE 100 MG
100 TABLET ORAL DAILY
Qty: 30 TABLET | Refills: 11 | Status: SHIPPED | OUTPATIENT
Start: 2021-02-19 | End: 2021-04-22 | Stop reason: SDUPTHER

## 2021-04-22 RX ORDER — DOXYCYCLINE HYCLATE 100 MG
100 TABLET ORAL DAILY
Qty: 90 TABLET | Refills: 3 | Status: SHIPPED | OUTPATIENT
Start: 2021-04-22 | End: 2023-05-18

## 2022-02-02 DIAGNOSIS — G47.00 INSOMNIA, UNSPECIFIED TYPE: ICD-10-CM

## 2022-02-02 RX ORDER — TRAZODONE HYDROCHLORIDE 50 MG/1
TABLET ORAL
Qty: 180 TABLET | Refills: 0 | Status: SHIPPED | OUTPATIENT
Start: 2022-02-02 | End: 2023-03-23 | Stop reason: SDUPTHER

## 2022-02-02 NOTE — TELEPHONE ENCOUNTER
Care Due:                  Date            Visit Type   Department     Provider  --------------------------------------------------------------------------------                                MYCHART                              ANNUAL                              CHECKUP/PHY  MET INTERNAL  Last Visit: 02-      S            KRISTIN Ahn  Next Visit: None Scheduled  None         None Found                                                            Last  Test          Frequency    Reason                     Performed    Due Date  --------------------------------------------------------------------------------    Office Visit  12 months..  amLODIPine, losartan,      02- 02-                             metFORMIN, rosuvastatin,                             tadalafiL, traZODone,                             valACYclovir.............    CBC.........  12 months..  valACYclovir.............  02- 02-    CMP.........  12 months..  losartan, metFORMIN,       Not Found    Overdue                             rosuvastatin,                             valACYclovir.............    HBA1C.......  6 months...  metFORMIN................  02- 08-    Lipid Panel.  12 months..  rosuvastatin.............  Not Found    Overdue    Powered by [employer]. Reference number: [id number].   2/02/2022 2:31:10 AM CST

## 2022-03-16 ENCOUNTER — PATIENT MESSAGE (OUTPATIENT)
Dept: ADMINISTRATIVE | Facility: HOSPITAL | Age: 67
End: 2022-03-16
Payer: COMMERCIAL

## 2022-03-30 ENCOUNTER — TELEPHONE (OUTPATIENT)
Dept: INTERNAL MEDICINE | Facility: CLINIC | Age: 67
End: 2022-03-30
Payer: COMMERCIAL

## 2022-03-30 DIAGNOSIS — E11.9 TYPE 2 DIABETES MELLITUS WITHOUT COMPLICATION, WITHOUT LONG-TERM CURRENT USE OF INSULIN: Primary | ICD-10-CM

## 2022-03-30 NOTE — TELEPHONE ENCOUNTER
Please put in a referral for diabetes management  Due to pt getting approved for [employer] selene sensor and needing to be educated on use of sensor

## 2022-03-31 ENCOUNTER — TELEPHONE (OUTPATIENT)
Dept: DIABETES | Facility: CLINIC | Age: 67
End: 2022-03-31
Payer: COMMERCIAL

## 2022-04-25 RX ORDER — DOXYCYCLINE HYCLATE 100 MG
TABLET ORAL
Qty: 90 TABLET | Refills: 3 | OUTPATIENT
Start: 2022-04-25

## 2022-05-05 RX ORDER — METFORMIN HYDROCHLORIDE 500 MG/1
TABLET ORAL
Qty: 90 TABLET | Refills: 0 | OUTPATIENT
Start: 2022-05-05

## 2022-05-05 RX ORDER — AMLODIPINE BESYLATE 5 MG/1
TABLET ORAL
Qty: 180 TABLET | Refills: 0 | OUTPATIENT
Start: 2022-05-05

## 2022-05-05 RX ORDER — LOSARTAN POTASSIUM 100 MG/1
TABLET ORAL
Qty: 90 TABLET | Refills: 0 | OUTPATIENT
Start: 2022-05-05

## 2022-05-05 NOTE — TELEPHONE ENCOUNTER
Refill Routing Note   Medication(s) are not appropriate for processing by [facility] for the following reason(s):      - Required laboratory values are outdated  - Required vitals are outdated    [facility] action(s):  Defer Medication-related problems identified: Requires labs        Medication reconciliation completed: No     Appointments  past 12m or future 3m with PCP    Date Provider   Last Visit   2/17/2021 Dalton Ahn MD   Next Visit   Visit date not found Dalton Ahn MD   ED visits in past 90 days: 0        Note composed:2:45 PM 05/05/2022

## 2022-05-05 NOTE — TELEPHONE ENCOUNTER
Refill Routing Note   Medication(s) are not appropriate for processing by [facility] for the following reason(s):      - Required laboratory values are outdated  - Required laboratory values are abnormal  - Required vitals are abnormal    [facility] action(s):  Defer          Medication reconciliation completed: No     Appointments  past 12m or future 3m with PCP    Date Provider   Last Visit   2/17/2021 Dalton Ahn MD   Next Visit   Visit date not found Dalton Ahn MD   ED visits in past 90 days: 0        Note composed:12:59 PM 05/05/2022

## 2022-05-05 NOTE — TELEPHONE ENCOUNTER
The patient is overdue for a physical exam.  Secondarily, medications will not be sent across state lines.

## 2022-05-05 NOTE — TELEPHONE ENCOUNTER
No new care gaps identified.  [facility] Embedded Care Gaps. Reference number: [id number]. 5/05/2022   11:23:26 AM SUKUMART

## 2022-05-06 RX ORDER — VALACYCLOVIR HYDROCHLORIDE 1 G/1
TABLET, FILM COATED ORAL
Qty: 90 TABLET | Refills: 3 | Status: SHIPPED | OUTPATIENT
Start: 2022-05-06 | End: 2023-03-23 | Stop reason: SDUPTHER

## 2022-05-06 NOTE — TELEPHONE ENCOUNTER
Refill Routing Note   Medication(s) are not appropriate for processing by [facility] for the following reason(s):      - Required laboratory values are outdated    [facility] action(s):  Defer          Medication reconciliation completed: No     Appointments  past 12m or future 3m with PCP    Date Provider   Last Visit   2/17/2021 Dalton Ahn MD   Next Visit   6/9/2022 Dalton Ahn MD   ED visits in past 90 days: 0        Note composed:10:53 AM 05/06/2022

## 2022-05-06 NOTE — TELEPHONE ENCOUNTER
No new care gaps identified.  [facility] Embedded Care Gaps. Reference number: [id number]. 5/06/2022   2:33:16 AM CDT

## 2022-05-09 RX ORDER — OXYBUTYNIN CHLORIDE 5 MG/1
TABLET ORAL
Qty: 90 TABLET | Refills: 0 | Status: SHIPPED | OUTPATIENT
Start: 2022-05-09 | End: 2023-03-23 | Stop reason: SDUPTHER

## 2022-05-27 RX ORDER — LOSARTAN POTASSIUM 100 MG/1
TABLET ORAL
Qty: 90 TABLET | Refills: 0 | Status: SHIPPED | OUTPATIENT
Start: 2022-05-27 | End: 2022-10-07

## 2022-05-27 RX ORDER — METFORMIN HYDROCHLORIDE 500 MG/1
TABLET ORAL
Qty: 90 TABLET | Refills: 0 | Status: SHIPPED | OUTPATIENT
Start: 2022-05-27 | End: 2022-10-05

## 2022-05-27 NOTE — TELEPHONE ENCOUNTER
Care Due:                  Date            Visit Type   Department     Provider  --------------------------------------------------------------------------------                                MYCHART                              ANNUAL                              CHECKUP/PHY  MET INTERNAL  Last Visit: 02-      S            KRISTIN Ahn                              EP -                              PRIMARY      [facility] INTERNAL  Next Visit: 06-      CARE ([facility])   MEDICINE       Dalton Ahn                                                            Last  Test          Frequency    Reason                     Performed    Due Date  --------------------------------------------------------------------------------    CMP.........  12 months..  losartan, metFORMIN,       02- 02-                             valACYclovir.............    [employer] Embedded Care Gaps. Reference number: [id number]. 5/27/2022   2:33:01 AM CDT

## 2022-05-27 NOTE — TELEPHONE ENCOUNTER
Refill Routing Note   Medication(s) are not appropriate for processing by [facility] for the following reason(s):      - Patient has not been seen in over 15 months by PCP  - Required laboratory values are outdated    [facility] action(s):  Defer       Medication Therapy Plan: Labs and OV scheduled  Medication reconciliation completed: No     Appointments  past 12m or future 3m with PCP    Date Provider   Last Visit   2/17/2021 Dalton Ahn MD   Next Visit   6/9/2022 Dalton Ahn MD   ED visits in past 90 days: 0        Note composed:2:44 PM 05/27/2022

## 2022-06-10 ENCOUNTER — TELEPHONE (OUTPATIENT)
Dept: ADMINISTRATIVE | Facility: HOSPITAL | Age: 67
End: 2022-06-10
Payer: COMMERCIAL

## 2022-09-10 NOTE — TELEPHONE ENCOUNTER
Care Due:                  Date            Visit Type   Department     Provider  --------------------------------------------------------------------------------                                MYCHART                              ANNUAL                              CHECKUP/PHY  MET INTERNAL  Last Visit: 02-      [facility]       Dalton Ahn  Next Visit: None Scheduled  None         None Found                                                            Last  Test          Frequency    Reason                     Performed    Due Date  --------------------------------------------------------------------------------    Lipid Panel.  12 months..  rosuvastatin.............  02- 02-    [facility] Embedded Care Gaps. Reference number: [id number]. 9/10/2022   9:31:24 AM CDT

## 2022-09-11 RX ORDER — METFORMIN HYDROCHLORIDE 500 MG/1
TABLET ORAL
Qty: 90 TABLET | Refills: 0 | OUTPATIENT
Start: 2022-09-11

## 2022-09-11 RX ORDER — LOSARTAN POTASSIUM 100 MG/1
TABLET ORAL
Qty: 90 TABLET | Refills: 0 | OUTPATIENT
Start: 2022-09-11

## 2022-10-25 ENCOUNTER — TELEPHONE (OUTPATIENT)
Dept: INTERNAL MEDICINE | Facility: CLINIC | Age: 67
End: 2022-10-25
Payer: COMMERCIAL

## 2022-10-25 NOTE — TELEPHONE ENCOUNTER
The patient is overdue for a physical exam.  Labs and urine have been ordered.  Please schedule and inform the patient.  I will re-evaluate necessity of a statin during his physical exam.  Thank you.

## 2022-10-25 NOTE — TELEPHONE ENCOUNTER
Luisa states that the pt records show he is not taking Statin and the pt has diabetes.   They are wanting the provider to reassess the pt per the [employer] AHA guidelines, due to pt's over 40 to 75 should be taking this medication

## 2022-10-25 NOTE — TELEPHONE ENCOUNTER
----- Message from Ute Chris MA sent at 10/25/2022  1:34 PM CDT -----  Contact: Luisa julian/Li [phone]    ----- Message -----  From: Chikis Nettles  Sent: 10/25/2022  12:18 PM CDT  To: [facility] Staff    Luisa states that the pt records show he is not taking Statin and the pt has diabetes. They are wanting the provider to reassess the pt per the [facility] AHA guidelines, due to pt's over 40 to 75 should be taking this medication. Please call.        Thank you

## 2022-10-27 ENCOUNTER — TELEPHONE (OUTPATIENT)
Dept: INTERNAL MEDICINE | Facility: CLINIC | Age: 67
End: 2022-10-27
Payer: COMMERCIAL

## 2023-01-05 RX ORDER — METFORMIN HYDROCHLORIDE 500 MG/1
TABLET ORAL
Qty: 90 TABLET | Refills: 0 | OUTPATIENT
Start: 2023-01-05

## 2023-01-05 NOTE — TELEPHONE ENCOUNTER
Care Due:                  Date            Visit Type   Department     Provider  --------------------------------------------------------------------------------                                MYCHART                              ANNUAL                              CHECKUP/PHY  MET INTERNAL  Last Visit: 02-      S            KRISTIN Ahn  Next Visit: None Scheduled  None         None Found                                                            Last  Test          Frequency    Reason                     Performed    Due Date  --------------------------------------------------------------------------------    Office Visit  12 months..  losartan, metFORMIN,       02- 02-                             rosuvastatin, tadalafiL,                             traZODone, valACYclovir..    CBC.........  12 months..  valACYclovir.............  02- 02-    CMP.........  12 months..  losartan, metFORMIN,       02- 02-                             rosuvastatin,                             valACYclovir.............    HBA1C.......  6 months...  metFORMIN................  02- 08-    Lipid Panel.  12 months..  rosuvastatin.............  02- 02-    [facility] Embedded Care Gaps. Reference number: [id number]. 1/05/2023   2:31:30 AM CST

## 2023-02-16 DIAGNOSIS — I10 HYPERTENSION: ICD-10-CM

## 2023-03-01 ENCOUNTER — PATIENT OUTREACH (OUTPATIENT)
Dept: ADMINISTRATIVE | Facility: HOSPITAL | Age: 68
End: 2023-03-01
Payer: MEDICARE

## 2023-03-01 NOTE — PROGRESS NOTES
Health Maintenance Due   Topic Date Due    Shingles Vaccine (2 of 3) 05/25/2015    Colorectal Cancer Screening  09/27/2018    Hemoglobin A1c (Prediabetes)  02/12/2022    COVID-19 Vaccine (5 - Booster for Pfizer series) 09/19/2022     Chart reviewed.   Immunizations: Reconciled  Orders placed: N/A  Upcoming appts to satisfy COY topics: N/A

## 2023-03-12 ENCOUNTER — PATIENT MESSAGE (OUTPATIENT)
Dept: INTERNAL MEDICINE | Facility: CLINIC | Age: 68
End: 2023-03-12
Payer: MEDICARE

## 2023-03-23 ENCOUNTER — OFFICE VISIT (OUTPATIENT)
Dept: INTERNAL MEDICINE | Facility: CLINIC | Age: 68
End: 2023-03-23
Payer: COMMERCIAL

## 2023-03-23 ENCOUNTER — LAB VISIT (OUTPATIENT)
Dept: LAB | Facility: HOSPITAL | Age: 68
End: 2023-03-23
Attending: FAMILY MEDICINE
Payer: MEDICARE

## 2023-03-23 VITALS
SYSTOLIC BLOOD PRESSURE: 136 MMHG | HEIGHT: 73 IN | TEMPERATURE: 98 F | HEART RATE: 58 BPM | RESPIRATION RATE: 16 BRPM | BODY MASS INDEX: 27.76 KG/M2 | WEIGHT: 209.44 LBS | DIASTOLIC BLOOD PRESSURE: 84 MMHG | OXYGEN SATURATION: 97 %

## 2023-03-23 DIAGNOSIS — I25.84 CORONARY ARTERY DISEASE DUE TO CALCIFIED CORONARY LESION: ICD-10-CM

## 2023-03-23 DIAGNOSIS — I10 PRIMARY HYPERTENSION: ICD-10-CM

## 2023-03-23 DIAGNOSIS — G47.00 INSOMNIA, UNSPECIFIED TYPE: ICD-10-CM

## 2023-03-23 DIAGNOSIS — I25.10 CORONARY ARTERY DISEASE DUE TO CALCIFIED CORONARY LESION: ICD-10-CM

## 2023-03-23 DIAGNOSIS — Z00.01 ENCOUNTER FOR GENERAL ADULT MEDICAL EXAMINATION WITH ABNORMAL FINDINGS: Primary | ICD-10-CM

## 2023-03-23 DIAGNOSIS — R73.03 PREDIABETES: ICD-10-CM

## 2023-03-23 DIAGNOSIS — E78.5 HYPERLIPIDEMIA, UNSPECIFIED HYPERLIPIDEMIA TYPE: ICD-10-CM

## 2023-03-23 DIAGNOSIS — I77.810 ASCENDING AORTA DILATATION: ICD-10-CM

## 2023-03-23 DIAGNOSIS — Z00.01 ENCOUNTER FOR GENERAL ADULT MEDICAL EXAMINATION WITH ABNORMAL FINDINGS: ICD-10-CM

## 2023-03-23 DIAGNOSIS — Z86.010 PERSONAL HISTORY OF COLONIC POLYPS: ICD-10-CM

## 2023-03-23 DIAGNOSIS — N40.1 BENIGN PROSTATIC HYPERPLASIA WITH LOWER URINARY TRACT SYMPTOMS, SYMPTOM DETAILS UNSPECIFIED: ICD-10-CM

## 2023-03-23 PROBLEM — H43.12 VITREOUS HEMORRHAGE, LEFT: Status: RESOLVED | Noted: 2018-09-04 | Resolved: 2023-03-23

## 2023-03-23 LAB
ALBUMIN SERPL BCP-MCNC: 4.5 G/DL (ref 3.5–5.2)
ALP SERPL-CCNC: 82 U/L (ref 55–135)
ALT SERPL W/O P-5'-P-CCNC: 31 U/L (ref 10–44)
ANION GAP SERPL CALC-SCNC: 13 MMOL/L (ref 8–16)
AST SERPL-CCNC: 36 U/L (ref 10–40)
BASOPHILS # BLD AUTO: 0.04 K/UL (ref 0–0.2)
BASOPHILS NFR BLD: 0.6 % (ref 0–1.9)
BILIRUB SERPL-MCNC: 1.1 MG/DL (ref 0.1–1)
BUN SERPL-MCNC: 14 MG/DL (ref 8–23)
CALCIUM SERPL-MCNC: 10 MG/DL (ref 8.7–10.5)
CHLORIDE SERPL-SCNC: 101 MMOL/L (ref 95–110)
CHOLEST SERPL-MCNC: 226 MG/DL (ref 120–199)
CHOLEST/HDLC SERPL: 4.3 {RATIO} (ref 2–5)
CO2 SERPL-SCNC: 25 MMOL/L (ref 23–29)
CREAT SERPL-MCNC: 0.9 MG/DL (ref 0.5–1.4)
DIFFERENTIAL METHOD: NORMAL
EOSINOPHIL # BLD AUTO: 0.1 K/UL (ref 0–0.5)
EOSINOPHIL NFR BLD: 1.3 % (ref 0–8)
ERYTHROCYTE [DISTWIDTH] IN BLOOD BY AUTOMATED COUNT: 12.9 % (ref 11.5–14.5)
EST. GFR  (NO RACE VARIABLE): >60 ML/MIN/1.73 M^2
ESTIMATED AVG GLUCOSE: 134 MG/DL (ref 68–131)
GLUCOSE SERPL-MCNC: 95 MG/DL (ref 70–110)
HBA1C MFR BLD: 6.3 % (ref 4–5.6)
HCT VFR BLD AUTO: 49.9 % (ref 40–54)
HDLC SERPL-MCNC: 53 MG/DL (ref 40–75)
HDLC SERPL: 23.5 % (ref 20–50)
HGB BLD-MCNC: 16.1 G/DL (ref 14–18)
IMM GRANULOCYTES # BLD AUTO: 0.01 K/UL (ref 0–0.04)
IMM GRANULOCYTES NFR BLD AUTO: 0.1 % (ref 0–0.5)
LDLC SERPL CALC-MCNC: 153 MG/DL (ref 63–159)
LYMPHOCYTES # BLD AUTO: 1.4 K/UL (ref 1–4.8)
LYMPHOCYTES NFR BLD: 19.7 % (ref 18–48)
MCH RBC QN AUTO: 30.4 PG (ref 27–31)
MCHC RBC AUTO-ENTMCNC: 32.3 G/DL (ref 32–36)
MCV RBC AUTO: 94 FL (ref 82–98)
MONOCYTES # BLD AUTO: 0.6 K/UL (ref 0.3–1)
MONOCYTES NFR BLD: 9 % (ref 4–15)
NEUTROPHILS # BLD AUTO: 4.9 K/UL (ref 1.8–7.7)
NEUTROPHILS NFR BLD: 69.3 % (ref 38–73)
NONHDLC SERPL-MCNC: 173 MG/DL
NRBC BLD-RTO: 0 /100 WBC
PLATELET # BLD AUTO: 204 K/UL (ref 150–450)
PMV BLD AUTO: 12.5 FL (ref 9.2–12.9)
POTASSIUM SERPL-SCNC: 4.6 MMOL/L (ref 3.5–5.1)
PROT SERPL-MCNC: 7.7 G/DL (ref 6–8.4)
RBC # BLD AUTO: 5.3 M/UL (ref 4.6–6.2)
SODIUM SERPL-SCNC: 139 MMOL/L (ref 136–145)
T4 FREE SERPL-MCNC: 1.09 NG/DL (ref 0.71–1.51)
TRIGL SERPL-MCNC: 100 MG/DL (ref 30–150)
TSH SERPL DL<=0.005 MIU/L-ACNC: 0.83 UIU/ML (ref 0.4–4)
WBC # BLD AUTO: 7.12 K/UL (ref 3.9–12.7)

## 2023-03-23 PROCEDURE — 3075F PR MOST RECENT SYSTOLIC BLOOD PRESS GE 130-139MM HG: ICD-10-PCS | Mod: CPTII,S$GLB,, | Performed by: FAMILY MEDICINE

## 2023-03-23 PROCEDURE — 84439 ASSAY OF FREE THYROXINE: CPT | Performed by: FAMILY MEDICINE

## 2023-03-23 PROCEDURE — 99999 PR PBB SHADOW E&M-EST. PATIENT-LVL IV: CPT | Mod: PBBFAC,,, | Performed by: FAMILY MEDICINE

## 2023-03-23 PROCEDURE — 3079F DIAST BP 80-89 MM HG: CPT | Mod: CPTII,S$GLB,, | Performed by: FAMILY MEDICINE

## 2023-03-23 PROCEDURE — 3008F BODY MASS INDEX DOCD: CPT | Mod: CPTII,S$GLB,, | Performed by: FAMILY MEDICINE

## 2023-03-23 PROCEDURE — 4010F PR ACE/ARB THEARPY RXD/TAKEN: ICD-10-PCS | Mod: CPTII,S$GLB,, | Performed by: FAMILY MEDICINE

## 2023-03-23 PROCEDURE — 99397 PER PM REEVAL EST PAT 65+ YR: CPT | Mod: S$GLB,,, | Performed by: FAMILY MEDICINE

## 2023-03-23 PROCEDURE — 1125F PR PAIN SEVERITY QUANTIFIED, PAIN PRESENT: ICD-10-PCS | Mod: CPTII,S$GLB,, | Performed by: FAMILY MEDICINE

## 2023-03-23 PROCEDURE — 1125F AMNT PAIN NOTED PAIN PRSNT: CPT | Mod: CPTII,S$GLB,, | Performed by: FAMILY MEDICINE

## 2023-03-23 PROCEDURE — 85025 COMPLETE CBC W/AUTO DIFF WBC: CPT | Performed by: FAMILY MEDICINE

## 2023-03-23 PROCEDURE — 1159F MED LIST DOCD IN RCRD: CPT | Mod: CPTII,S$GLB,, | Performed by: FAMILY MEDICINE

## 2023-03-23 PROCEDURE — 99999 PR PBB SHADOW E&M-EST. PATIENT-LVL IV: ICD-10-PCS | Mod: PBBFAC,,, | Performed by: FAMILY MEDICINE

## 2023-03-23 PROCEDURE — 3079F PR MOST RECENT DIASTOLIC BLOOD PRESSURE 80-89 MM HG: ICD-10-PCS | Mod: CPTII,S$GLB,, | Performed by: FAMILY MEDICINE

## 2023-03-23 PROCEDURE — 80061 LIPID PANEL: CPT | Performed by: FAMILY MEDICINE

## 2023-03-23 PROCEDURE — 1159F PR MEDICATION LIST DOCUMENTED IN MEDICAL RECORD: ICD-10-PCS | Mod: CPTII,S$GLB,, | Performed by: FAMILY MEDICINE

## 2023-03-23 PROCEDURE — 84443 ASSAY THYROID STIM HORMONE: CPT | Performed by: FAMILY MEDICINE

## 2023-03-23 PROCEDURE — 4010F ACE/ARB THERAPY RXD/TAKEN: CPT | Mod: CPTII,S$GLB,, | Performed by: FAMILY MEDICINE

## 2023-03-23 PROCEDURE — 1160F PR REVIEW ALL MEDS BY PRESCRIBER/CLIN PHARMACIST DOCUMENTED: ICD-10-PCS | Mod: CPTII,S$GLB,, | Performed by: FAMILY MEDICINE

## 2023-03-23 PROCEDURE — 3008F PR BODY MASS INDEX (BMI) DOCUMENTED: ICD-10-PCS | Mod: CPTII,S$GLB,, | Performed by: FAMILY MEDICINE

## 2023-03-23 PROCEDURE — 99397 PR PREVENTIVE VISIT,EST,65 & OVER: ICD-10-PCS | Mod: S$GLB,,, | Performed by: FAMILY MEDICINE

## 2023-03-23 PROCEDURE — 82306 VITAMIN D 25 HYDROXY: CPT | Performed by: FAMILY MEDICINE

## 2023-03-23 PROCEDURE — 84153 ASSAY OF PSA TOTAL: CPT | Performed by: FAMILY MEDICINE

## 2023-03-23 PROCEDURE — 83525 ASSAY OF INSULIN: CPT | Performed by: FAMILY MEDICINE

## 2023-03-23 PROCEDURE — 36415 COLL VENOUS BLD VENIPUNCTURE: CPT | Mod: PO | Performed by: FAMILY MEDICINE

## 2023-03-23 PROCEDURE — 1160F RVW MEDS BY RX/DR IN RCRD: CPT | Mod: CPTII,S$GLB,, | Performed by: FAMILY MEDICINE

## 2023-03-23 PROCEDURE — 3075F SYST BP GE 130 - 139MM HG: CPT | Mod: CPTII,S$GLB,, | Performed by: FAMILY MEDICINE

## 2023-03-23 PROCEDURE — 80053 COMPREHEN METABOLIC PANEL: CPT | Performed by: FAMILY MEDICINE

## 2023-03-23 PROCEDURE — 83036 HEMOGLOBIN GLYCOSYLATED A1C: CPT | Performed by: FAMILY MEDICINE

## 2023-03-23 RX ORDER — TADALAFIL 5 MG/1
5 TABLET ORAL DAILY
Qty: 30 TABLET | Refills: 11 | Status: SHIPPED | OUTPATIENT
Start: 2023-03-23 | End: 2023-03-23

## 2023-03-23 RX ORDER — AMLODIPINE BESYLATE 5 MG/1
5 TABLET ORAL 2 TIMES DAILY
Qty: 180 TABLET | Refills: 3 | Status: SHIPPED | OUTPATIENT
Start: 2023-03-23 | End: 2023-05-18

## 2023-03-23 RX ORDER — LOSARTAN POTASSIUM 100 MG/1
100 TABLET ORAL DAILY
Qty: 90 TABLET | Refills: 3 | Status: SHIPPED | OUTPATIENT
Start: 2023-03-23 | End: 2024-02-20

## 2023-03-23 RX ORDER — OXYBUTYNIN CHLORIDE 5 MG/1
5 TABLET ORAL NIGHTLY
Qty: 90 TABLET | Refills: 3 | Status: SHIPPED | OUTPATIENT
Start: 2023-03-23 | End: 2023-05-18

## 2023-03-23 RX ORDER — TADALAFIL 5 MG/1
5 TABLET ORAL DAILY
Qty: 365 TABLET | Refills: 0 | Status: SHIPPED | OUTPATIENT
Start: 2023-03-23 | End: 2024-03-28

## 2023-03-23 RX ORDER — TRAZODONE HYDROCHLORIDE 50 MG/1
TABLET ORAL
Qty: 180 TABLET | Refills: 3 | Status: SHIPPED | OUTPATIENT
Start: 2023-03-23 | End: 2024-02-26

## 2023-03-23 RX ORDER — METFORMIN HYDROCHLORIDE 500 MG/1
500 TABLET ORAL DAILY
Qty: 90 TABLET | Refills: 3 | Status: SHIPPED | OUTPATIENT
Start: 2023-03-23 | End: 2024-02-26

## 2023-03-23 RX ORDER — VALACYCLOVIR HYDROCHLORIDE 1 G/1
1000 TABLET, FILM COATED ORAL DAILY
Qty: 90 TABLET | Refills: 3 | Status: SHIPPED | OUTPATIENT
Start: 2023-03-23 | End: 2024-02-20

## 2023-03-23 NOTE — PROGRESS NOTES
Subjective:       Patient ID: Dominguez Zapata is a 67 y.o. male.    Chief Complaint: Hypertension  67-year-old white male presents to clinic today for annual physical exam.  He continues to take Cialis 5 mg daily for treatment of BPH which remains stable.  He has been followed by Neurosurgery as needed UA cavernous malformation which has remained stable.  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  Hypertension remains well controlled on amlodipine 5 mg b.i.d. and losartan 100 mg daily.  He has been treated for prediabetes with metformin 500 mg daily but reports that he has been focusing on a low carb diet and stopped the medication approximately 1 year ago.  He continues to use a continuous glucose monitor and on average has a glucose range in the 120s.  He has a past surgical history of prostate biopsy and hernia repair.  He has a family history of his mother passing away from renal failure at the age of 70.  His father passed away from diabetes at the age of 83.  Colonoscopy has been ordered.  He is up-to-date with all vaccinations.  Hypertension  This is a recurrent problem. The current episode started more than 1 year ago. The problem has been waxing and waning since onset. The problem is resistant. Associated symptoms include anxiety, headaches, malaise/fatigue, peripheral edema and shortness of breath. Pertinent negatives include no chest pain, neck pain or palpitations. Past treatments include ACE inhibitors and calcium channel blockers. The current treatment provides moderate improvement. There are no compliance problems.    Review of Systems   Constitutional:  Positive for malaise/fatigue. Negative for appetite change, chills, fatigue and fever.   HENT:  Negative for nasal congestion, ear pain, hearing loss, postnasal drip, rhinorrhea, sinus pressure/congestion, sore throat and tinnitus.    Eyes:  Negative for redness,  itching and visual disturbance.   Respiratory:  Positive for shortness of breath. Negative for cough and chest tightness.    Cardiovascular:  Negative for chest pain and palpitations.   Gastrointestinal:  Negative for abdominal pain, constipation, diarrhea, nausea and vomiting.   Genitourinary:  Negative for decreased urine volume, difficulty urinating, dysuria, frequency, hematuria and urgency.   Musculoskeletal:  Negative for back pain, myalgias, neck pain and neck stiffness.   Integumentary:  Negative for rash.   Neurological:  Positive for headaches. Negative for dizziness and light-headedness.   Psychiatric/Behavioral: Negative.         Objective:      Physical Exam  Vitals and nursing note reviewed.   Constitutional:       General: He is not in acute distress.     Appearance: Normal appearance. He is well-developed. He is not diaphoretic.   HENT:      Head: Normocephalic and atraumatic.      Right Ear: External ear normal.      Left Ear: External ear normal.      Nose: Nose normal.      Mouth/Throat:      Pharynx: No oropharyngeal exudate.   Eyes:      General: No scleral icterus.        Right eye: No discharge.         Left eye: No discharge.      Conjunctiva/sclera: Conjunctivae normal.      Pupils: Pupils are equal, round, and reactive to light.   Neck:      Thyroid: No thyromegaly.      Vascular: No JVD.      Trachea: No tracheal deviation.   Cardiovascular:      Rate and Rhythm: Normal rate and regular rhythm.      Heart sounds: Normal heart sounds. No murmur heard.    No friction rub. No gallop.   Pulmonary:      Effort: Pulmonary effort is normal. No respiratory distress.      Breath sounds: Normal breath sounds. No stridor. No wheezing, rhonchi or rales.   Chest:      Chest wall: No tenderness.   Abdominal:      General: Bowel sounds are normal. There is no distension.      Palpations: Abdomen is soft. There is no mass.      Tenderness: There is no abdominal tenderness. There is no guarding or rebound.    Musculoskeletal:         General: No tenderness. Normal range of motion.      Cervical back: Normal range of motion and neck supple.   Lymphadenopathy:      Cervical: No cervical adenopathy.   Skin:     General: Skin is warm and dry.      Coloration: Skin is not pale.      Findings: No erythema or rash.   Neurological:      Mental Status: He is alert and oriented to person, place, and time.   Psychiatric:         Mood and Affect: Mood normal.         Behavior: Behavior normal.         Thought Content: Thought content normal.         Judgment: Judgment normal.       Assessment:       Problem List Items Addressed This Visit       Ascending aorta dilatation (Chronic)    Relevant Orders    CBC Auto Differential    Comprehensive Metabolic Panel    Lipid Panel    T4, Free    TSH    Urinalysis    Hemoglobin A1C    Microalbumin/Creatinine Ratio, Urine    Prostate Specific Antigen, Diagnostic    Vitamin D    Benign prostatic hyperplasia    Relevant Medications    tadalafiL (CIALIS) 5 MG tablet    Other Relevant Orders    CBC Auto Differential    Comprehensive Metabolic Panel    Lipid Panel    T4, Free    TSH    Urinalysis    Hemoglobin A1C    Microalbumin/Creatinine Ratio, Urine    Prostate Specific Antigen, Diagnostic    Vitamin D    Coronary artery disease due to calcified coronary lesion    Relevant Orders    Vitamin D    Hyperlipidemia    Relevant Orders    CBC Auto Differential    Comprehensive Metabolic Panel    Lipid Panel    T4, Free    TSH    Urinalysis    Hemoglobin A1C    Microalbumin/Creatinine Ratio, Urine    Prostate Specific Antigen, Diagnostic    Vitamin D    Hypertension    Relevant Orders    CBC Auto Differential    Comprehensive Metabolic Panel    Lipid Panel    T4, Free    TSH    Urinalysis    Hemoglobin A1C    Microalbumin/Creatinine Ratio, Urine    Prostate Specific Antigen, Diagnostic    Vitamin D    Prediabetes    Relevant Orders    CBC Auto Differential    Comprehensive Metabolic Panel    Lipid  Panel    T4, Free    TSH    Urinalysis    Hemoglobin A1C    Microalbumin/Creatinine Ratio, Urine    Prostate Specific Antigen, Diagnostic    Vitamin D    Insulin, random     Other Visit Diagnoses       Encounter for general adult medical examination with abnormal findings    -  Primary    Relevant Orders    CBC Auto Differential    Comprehensive Metabolic Panel    Lipid Panel    T4, Free    TSH    Urinalysis    Hemoglobin A1C    Microalbumin/Creatinine Ratio, Urine    Prostate Specific Antigen, Diagnostic    Vitamin D    Personal history of colonic polyps        Relevant Orders    Ambulatory referral/consult to Endo Procedure     CBC Auto Differential    Comprehensive Metabolic Panel    Lipid Panel    T4, Free    TSH    Urinalysis    Hemoglobin A1C    Microalbumin/Creatinine Ratio, Urine    Prostate Specific Antigen, Diagnostic    Vitamin D    Insomnia, unspecified type        Relevant Medications    traZODone (DESYREL) 50 MG tablet            Plan:         1. CBC, CMP, UA, TSH, free T4, fasting lipids, vitamin-D level, hemoglobin A1c, urine microalbumin to creatinine ratio, PSA, and insulin level.  2. Continue amlodipine 5 mg b.i.d. and losartan 100 mg daily.  Coronary artery disease and hypertension are stable.    3. Recommend restarting metformin 500 mg daily for treatment prediabetes.    4. Patient has taken niacin for treatment of hyperlipidemia with stability of cholesterol levels.  Continue use.  5. Patient has mild ascending aortic dilation per ultrasound which remains stable.  6. Screening colonoscopy.  7. Continue use of Cialis as needed for treatment of BPH.  8. Continue use of trazodone as needed for insomnia.    9. Return to clinic as needed or in 1 year for annual physical exam.

## 2023-03-24 ENCOUNTER — PATIENT MESSAGE (OUTPATIENT)
Dept: INTERNAL MEDICINE | Facility: CLINIC | Age: 68
End: 2023-03-24
Payer: MEDICARE

## 2023-03-24 DIAGNOSIS — R97.20 ELEVATED PSA: ICD-10-CM

## 2023-03-24 DIAGNOSIS — N40.1 BENIGN PROSTATIC HYPERPLASIA WITH LOWER URINARY TRACT SYMPTOMS, SYMPTOM DETAILS UNSPECIFIED: Primary | ICD-10-CM

## 2023-03-24 LAB
25(OH)D3+25(OH)D2 SERPL-MCNC: 128 NG/ML (ref 30–96)
COMPLEXED PSA SERPL-MCNC: 10.5 NG/ML (ref 0–4)
INSULIN COLLECTION INTERVAL: NORMAL
INSULIN SERPL-ACNC: 5.1 UU/ML

## 2023-03-24 NOTE — TELEPHONE ENCOUNTER
I have ordered a urology referral for evaluation of the patient's continued elevated PSA level.  Please schedule.  Thank you.

## 2023-03-28 ENCOUNTER — PATIENT MESSAGE (OUTPATIENT)
Dept: INTERNAL MEDICINE | Facility: CLINIC | Age: 68
End: 2023-03-28
Payer: MEDICARE

## 2023-03-28 DIAGNOSIS — R73.03 PREDIABETES: ICD-10-CM

## 2023-03-28 RX ORDER — FLASH GLUCOSE SENSOR
KIT MISCELLANEOUS
Qty: 2 KIT | Refills: 11 | OUTPATIENT
Start: 2023-03-28

## 2023-03-28 RX ORDER — FLASH GLUCOSE SENSOR
KIT MISCELLANEOUS
Qty: 2 KIT | Refills: 11 | Status: SHIPPED | OUTPATIENT
Start: 2023-03-28

## 2023-03-28 NOTE — TELEPHONE ENCOUNTER
No new care gaps identified.  [facility] Embedded Care Gaps. Reference number: [id number]. 3/28/2023   10:59:49 AM SUKUMART

## 2023-03-28 NOTE — TELEPHONE ENCOUNTER
Refill Routing Note   Medication(s) are not appropriate for processing by [facility] for the following reason(s):      Medication affordability  Pharmacy comment: Alternative Requested:NOT COVERED REQUIRE PA.    [facility] action(s):  Defer          Medication reconciliation completed: No     Appointments  past 12m or future 3m with PCP    Date Provider   Last Visit   3/23/2023 Dalton Ahn MD   Next Visit   Visit date not found Dalton Ahn MD   ED visits in past 90 days: 0        Note composed:11:33 AM 03/28/2023

## 2023-03-28 NOTE — TELEPHONE ENCOUNTER
No new care gaps identified.  [facility] Embedded Care Gaps. Reference number: [id number]. 3/28/2023   11:19:42 AM SUKUMART

## 2023-04-03 ENCOUNTER — OFFICE VISIT (OUTPATIENT)
Dept: SLEEP MEDICINE | Facility: CLINIC | Age: 68
End: 2023-04-03
Payer: MEDICARE

## 2023-04-03 DIAGNOSIS — G47.33 OSA (OBSTRUCTIVE SLEEP APNEA): Primary | ICD-10-CM

## 2023-04-03 PROCEDURE — 4010F ACE/ARB THERAPY RXD/TAKEN: CPT | Mod: CPTII,95,, | Performed by: PSYCHIATRY & NEUROLOGY

## 2023-04-03 PROCEDURE — 99214 PR OFFICE/OUTPT VISIT, EST, LEVL IV, 30-39 MIN: ICD-10-PCS | Mod: 95,,, | Performed by: PSYCHIATRY & NEUROLOGY

## 2023-04-03 PROCEDURE — 4010F PR ACE/ARB THEARPY RXD/TAKEN: ICD-10-PCS | Mod: CPTII,95,, | Performed by: PSYCHIATRY & NEUROLOGY

## 2023-04-03 PROCEDURE — 3066F NEPHROPATHY DOC TX: CPT | Mod: CPTII,95,, | Performed by: PSYCHIATRY & NEUROLOGY

## 2023-04-03 PROCEDURE — 3044F HG A1C LEVEL LT 7.0%: CPT | Mod: CPTII,95,, | Performed by: PSYCHIATRY & NEUROLOGY

## 2023-04-03 PROCEDURE — 99214 OFFICE O/P EST MOD 30 MIN: CPT | Mod: 95,,, | Performed by: PSYCHIATRY & NEUROLOGY

## 2023-04-03 PROCEDURE — 3066F PR DOCUMENTATION OF TREATMENT FOR NEPHROPATHY: ICD-10-PCS | Mod: CPTII,95,, | Performed by: PSYCHIATRY & NEUROLOGY

## 2023-04-03 PROCEDURE — 3044F PR MOST RECENT HEMOGLOBIN A1C LEVEL <7.0%: ICD-10-PCS | Mod: CPTII,95,, | Performed by: PSYCHIATRY & NEUROLOGY

## 2023-04-03 PROCEDURE — 3061F PR NEG MICROALBUMINURIA RESULT DOCUMENTED/REVIEW: ICD-10-PCS | Mod: CPTII,95,, | Performed by: PSYCHIATRY & NEUROLOGY

## 2023-04-03 PROCEDURE — 3061F NEG MICROALBUMINURIA REV: CPT | Mod: CPTII,95,, | Performed by: PSYCHIATRY & NEUROLOGY

## 2023-04-03 NOTE — PROGRESS NOTES
EPWORTH SLEEPINESS SCALE TOTAL SCORE  2/17/2019 4/17/2018   Total score 14 17       Dominguez Zapata is a 67 y.o. male seen today for CPAP follow up. Last seen on 2/19/2019.    Concerned with SAO2 desats while on BPAP - using Garmin device - looks like SaO2 ranged 80-95% in his sleep; Normal oxygen during wakefulness.   Low oxygen feedback days correlate with morning headache. Izzy was 70.  Can exercise well.  Trazodone was restarted again.    The machine was interrogated     with   the help of the patient as the modem has not been sending since 2019; AutoBPAP; IPAP max - 16; EPAP min -8; PS - cm H2O  Info: Therapy hrs - 30 days - 3.3 ; 7 dasy - 6 hrs; 6300 general hrs; mask fit - 1 day -100%; 30 days - 97%; AHI - 24.3/hr (nut was 6/hr was over the last week).    Using Dreamwear 2 pillow mask    May need an order for a new machine  Would want to do study to see if his BPAP settings are optimal and confirm hypoxemia.      He got the PU foam out of his machine himself so did not register with [employer] order machine autoBPAP machine      DME: BRANDON got machineover 5 yrs ago      Sleep studies:       SPLIT NIGHT STUDY on 8/18/15: Significant ASPEN (Obstructive Sleep Apnea) with AHI of 96.4 hour and SaO2 izzy of 81% ([id number] lbs). Best control of respiratory events was reached at 15/9 cm H2O CPAP (AHI 9).      2DEcho 2017: CONCLUSIONS     1 - Mildly enlarged ascending aorta.     2 - Moderate left atrial enlargement.     3 - Normal left ventricular systolic function (EF 60-65%).     4 - Normal left ventricular diastolic function.     5 - Normal right ventricular systolic function .     6 - Mild aortic regurgitation.     7 - Mild to moderate mitral regurgitation.     8 - The estimated PA systolic pressure is 28 mmHg.     CT Chest 3/18:    Sequela of prior granulomatous disease involving the liver, spleen, mediastinal lymph nodes and right middle lobe.    Subsegmental atelectasis in the LEFT lower  "lobe and lingula.    Finding suggestive of eventration of the left hemidiaphragm.  If concern for diaphragmatic paralysis, further evaluation with fluoroscopic sniff test may be performed.    4/3/18: FluoroCXR:   Paradoxical elevation of the left hemidiaphragm upon abrupt sniffing, consistent with paresis of the left phrenic nerve.    This report was flagged in Epic as abnormal.    Electronically signed by resident: Vance Li        DME: ->[facility]      PAST MEDICAL HISTORY:      PHYSICAL EXAM:  There were no vitals taken for this visit.  GENERAL: Normal development, well groomed.  HEENT:   HEENT:  Conjunctivae are non-erythematous; Pupils equal, round, and reactive to light; Nose is symmetrical; Nasal mucosa is pink and moist; Septum is midline; Inferior turbinates are normal; Nasal airflow is normal; Posterior pharynx is pink; Modified Mallampati: II; Posterior palate is arched; Tonsils not visualized; Uvula is wide and elongated;Tongue is enlarged; Dentition is fair; No TMJ tenderness; Jaw opening and protrusion without click and without discomfort.  NECK: Supple. Neck circumference is 16 inches. No thyromegaly. No palpable nodes.     SKIN: On face and neck: No abrasions, no rashes, no lesions.  No subcutaneous nodules are palpable.  RESPIRATORY: Chest is clear to auscultation.  Normal chest expansion and non-labored breathing at rest.  CARDIOVASCULAR: Normal S1, S2.  No murmurs, gallops or rubs. No carotid bruits bilaterally.  No edema. No clubbing. No cyanosis.    NEURO: Oriented to time, place and person. Normal attention span and concentration. Gait normal.    PSYCH: Affect is full. Mood is normal  MUSCULOSKELETAL: Moves 4 extremities. Gait normal.         Using [facility]: Yes      ASSESSMENT:    1. Severe ASPEN The patient symptomatically has  excessive daytime sleepiness, snoring, gasping for air in sleep and interrupted sleep  with exam findings of "a crowded oral airway and elevated body mass index. The " patient has medical co-morbidities of diabetes, hyperlipidemia and hypertension,  which can be worsened by ASPEN. This warrants treatment. Good control of AHI now on PS 4 IPAP min 14; IPAP max 20, however amplitude looks low at times. RECENT DS OF PARALYZED HEMIDIAPHRAGM.    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  For now will uise his (self refurbished) ABPAP at same settings  Will reorder supples  Will order a new [employer]med with modem autoBPAP  from EPAP min - 10; IPAP max - 24; PS 6-10 (increase to 7-10 to impove ventilation). Keep ramp at 10/4.  Wants an extra ABPAP for travel.      Education: During our discussion today, we talked about the etiology of obstructive sleep apnea as well as the potential ramifications of untreated sleep apnea, which could include daytime sleepiness, hypertension, heart disease and/or stroke.  We discussed potential treatment options, which could include weight loss, body positioning, continuous positive airway pressure (CPAP), or referral for surgical consideration. The patient preferred CPAP option.    She should avoid ETOH and sedatives at night, as it tends to aggravate ASPEN. Regular replacement of CPAP mask, tubing and filter was recommended.    Precautions: The patient was advised to abstain from driving should he feel sleepy or drowsy.    Follow up: 12 months    Thank you for allowing me the opportunity to participate in the care of your patient.

## 2023-04-04 ENCOUNTER — CLINICAL SUPPORT (OUTPATIENT)
Dept: ENDOSCOPY | Facility: HOSPITAL | Age: 68
End: 2023-04-04
Attending: FAMILY MEDICINE
Payer: MEDICARE

## 2023-04-04 DIAGNOSIS — Z86.010 PERSONAL HISTORY OF COLONIC POLYPS: ICD-10-CM

## 2023-04-04 NOTE — PLAN OF CARE
Contacted patient for 10:00 am PAT appointment. Patient last seen by cardiology on 9/6/2019 with recommended follow up in one year. Patient informed he will need to follow up with cardiology prior to scheduling colonoscopy. Patient verbalizes understanding. Patient rescheduled for another PAT appointment and given phone number to the endoscopy scheduling department.

## 2023-04-11 NOTE — TELEPHONE ENCOUNTER
----- Message from Enrike Velasquez MD sent at 5/9/2019  9:41 AM CDT -----  Please let Mr. Zapata know that his stress echo looked great.  No problems and well above average exercise capacity.  No evidence of any problems at all and no change from the prior study.    Thanks,  Enrike   [facility]ory[facility] Pregnancy And Lactation Text: It is unknown if this medication can cause problems during pregnancy and breastfeeding.

## 2023-04-13 ENCOUNTER — LAB VISIT (OUTPATIENT)
Dept: LAB | Facility: HOSPITAL | Age: 68
End: 2023-04-13
Attending: UROLOGY
Payer: MEDICARE

## 2023-04-13 ENCOUNTER — OFFICE VISIT (OUTPATIENT)
Dept: UROLOGY | Facility: CLINIC | Age: 68
End: 2023-04-13
Payer: MEDICARE

## 2023-04-13 VITALS
DIASTOLIC BLOOD PRESSURE: 85 MMHG | HEIGHT: 73 IN | HEART RATE: 49 BPM | WEIGHT: 209 LBS | SYSTOLIC BLOOD PRESSURE: 141 MMHG | BODY MASS INDEX: 27.7 KG/M2

## 2023-04-13 DIAGNOSIS — R97.20 ELEVATED PSA: Primary | ICD-10-CM

## 2023-04-13 DIAGNOSIS — R97.20 ELEVATED PSA: ICD-10-CM

## 2023-04-13 LAB
BILIRUB SERPL-MCNC: NORMAL MG/DL
BLOOD URINE, POC: NORMAL
CLARITY, POC UA: CLEAR
COLOR, POC UA: YELLOW
GLUCOSE UR QL STRIP: NORMAL
KETONES UR QL STRIP: NORMAL
LEUKOCYTE ESTERASE URINE, POC: NORMAL
NITRITE, POC UA: NORMAL
PH, POC UA: 5
PROTEIN, POC: NORMAL
SPECIFIC GRAVITY, POC UA: 1.01
TESTOST SERPL-MCNC: 190 NG/DL (ref 304–1227)
UROBILINOGEN, POC UA: NORMAL

## 2023-04-13 PROCEDURE — 3008F PR BODY MASS INDEX (BMI) DOCUMENTED: ICD-10-PCS | Mod: CPTII,S$GLB,, | Performed by: UROLOGY

## 2023-04-13 PROCEDURE — 3077F SYST BP >= 140 MM HG: CPT | Mod: CPTII,S$GLB,, | Performed by: UROLOGY

## 2023-04-13 PROCEDURE — 99213 OFFICE O/P EST LOW 20 MIN: CPT | Mod: S$GLB,,, | Performed by: UROLOGY

## 2023-04-13 PROCEDURE — 3061F PR NEG MICROALBUMINURIA RESULT DOCUMENTED/REVIEW: ICD-10-PCS | Mod: CPTII,S$GLB,, | Performed by: UROLOGY

## 2023-04-13 PROCEDURE — 81002 POCT URINE DIPSTICK WITHOUT MICROSCOPE: ICD-10-PCS | Mod: S$GLB,,, | Performed by: UROLOGY

## 2023-04-13 PROCEDURE — 99213 PR OFFICE/OUTPT VISIT, EST, LEVL III, 20-29 MIN: ICD-10-PCS | Mod: S$GLB,,, | Performed by: UROLOGY

## 2023-04-13 PROCEDURE — 1159F PR MEDICATION LIST DOCUMENTED IN MEDICAL RECORD: ICD-10-PCS | Mod: CPTII,S$GLB,, | Performed by: UROLOGY

## 2023-04-13 PROCEDURE — 3044F HG A1C LEVEL LT 7.0%: CPT | Mod: CPTII,S$GLB,, | Performed by: UROLOGY

## 2023-04-13 PROCEDURE — 3079F PR MOST RECENT DIASTOLIC BLOOD PRESSURE 80-89 MM HG: ICD-10-PCS | Mod: CPTII,S$GLB,, | Performed by: UROLOGY

## 2023-04-13 PROCEDURE — 1101F PR PT FALLS ASSESS DOC 0-1 FALLS W/OUT INJ PAST YR: ICD-10-PCS | Mod: CPTII,S$GLB,, | Performed by: UROLOGY

## 2023-04-13 PROCEDURE — 1126F PR PAIN SEVERITY QUANTIFIED, NO PAIN PRESENT: ICD-10-PCS | Mod: CPTII,S$GLB,, | Performed by: UROLOGY

## 2023-04-13 PROCEDURE — 3079F DIAST BP 80-89 MM HG: CPT | Mod: CPTII,S$GLB,, | Performed by: UROLOGY

## 2023-04-13 PROCEDURE — 36415 COLL VENOUS BLD VENIPUNCTURE: CPT | Performed by: UROLOGY

## 2023-04-13 PROCEDURE — 3288F FALL RISK ASSESSMENT DOCD: CPT | Mod: CPTII,S$GLB,, | Performed by: UROLOGY

## 2023-04-13 PROCEDURE — 84403 ASSAY OF TOTAL TESTOSTERONE: CPT | Performed by: UROLOGY

## 2023-04-13 PROCEDURE — 1101F PT FALLS ASSESS-DOCD LE1/YR: CPT | Mod: CPTII,S$GLB,, | Performed by: UROLOGY

## 2023-04-13 PROCEDURE — 99999 PR PBB SHADOW E&M-EST. PATIENT-LVL IV: CPT | Mod: PBBFAC,,, | Performed by: UROLOGY

## 2023-04-13 PROCEDURE — 3061F NEG MICROALBUMINURIA REV: CPT | Mod: CPTII,S$GLB,, | Performed by: UROLOGY

## 2023-04-13 PROCEDURE — 3077F PR MOST RECENT SYSTOLIC BLOOD PRESSURE >= 140 MM HG: ICD-10-PCS | Mod: CPTII,S$GLB,, | Performed by: UROLOGY

## 2023-04-13 PROCEDURE — 1159F MED LIST DOCD IN RCRD: CPT | Mod: CPTII,S$GLB,, | Performed by: UROLOGY

## 2023-04-13 PROCEDURE — 81002 URINALYSIS NONAUTO W/O SCOPE: CPT | Mod: S$GLB,,, | Performed by: UROLOGY

## 2023-04-13 PROCEDURE — 3066F NEPHROPATHY DOC TX: CPT | Mod: CPTII,S$GLB,, | Performed by: UROLOGY

## 2023-04-13 PROCEDURE — 3066F PR DOCUMENTATION OF TREATMENT FOR NEPHROPATHY: ICD-10-PCS | Mod: CPTII,S$GLB,, | Performed by: UROLOGY

## 2023-04-13 PROCEDURE — 3044F PR MOST RECENT HEMOGLOBIN A1C LEVEL <7.0%: ICD-10-PCS | Mod: CPTII,S$GLB,, | Performed by: UROLOGY

## 2023-04-13 PROCEDURE — 3008F BODY MASS INDEX DOCD: CPT | Mod: CPTII,S$GLB,, | Performed by: UROLOGY

## 2023-04-13 PROCEDURE — 1126F AMNT PAIN NOTED NONE PRSNT: CPT | Mod: CPTII,S$GLB,, | Performed by: UROLOGY

## 2023-04-13 PROCEDURE — 4010F PR ACE/ARB THEARPY RXD/TAKEN: ICD-10-PCS | Mod: CPTII,S$GLB,, | Performed by: UROLOGY

## 2023-04-13 PROCEDURE — 4010F ACE/ARB THERAPY RXD/TAKEN: CPT | Mod: CPTII,S$GLB,, | Performed by: UROLOGY

## 2023-04-13 PROCEDURE — 3288F PR FALLS RISK ASSESSMENT DOCUMENTED: ICD-10-PCS | Mod: CPTII,S$GLB,, | Performed by: UROLOGY

## 2023-04-13 PROCEDURE — 99999 PR PBB SHADOW E&M-EST. PATIENT-LVL IV: ICD-10-PCS | Mod: PBBFAC,,, | Performed by: UROLOGY

## 2023-04-13 NOTE — PROGRESS NOTES
Subjective:       Patient ID: Dominguez Zapata is a 67 y.o. male.    Chief Complaint: Elevated PSA    HPI patient is here with an elevated PSA of 10.  It was higher in the past and he is had negative MRIs and negative biopsies.  Patient has some mild voiding symptoms but he empties his bladder     Past Medical History:   Diagnosis Date    Colon polyps     Hearing loss in right ear     Low serum testosterone level     Migraine     Mixed hyperlipidemia     Hyperlipidemia    NS (nuclear sclerosis) 3/10/2015    Prediabetes     Stroke     Unspecified essential hypertension     Essential hypertension       Past Surgical History:   Procedure Laterality Date    bone anchored hearing aid Right 06/23/2015    COLONOSCOPY      HERNIA REPAIR      PROSTATE BIOPSY      PROSTATE BIOPSY  1/22/10    PSA 4.12, negative for malignancy, some chronic inflammation noted    PROSTATE SURGERY      Urolift       Family History   Problem Relation Age of Onset    Heart disease Mother     Diabetes Mother     Kidney disease Mother         Dialysis    Hypertension Mother     Diabetes Father     Heart disease Father         Valvular disease    Hypertension Father     Amblyopia Neg Hx     Blindness Neg Hx     Cataracts Neg Hx     Glaucoma Neg Hx     Macular degeneration Neg Hx     Retinal detachment Neg Hx     Strabismus Neg Hx     Asthma Neg Hx     Emphysema Neg Hx        Social History     Socioeconomic History    Marital status:    Occupational History    Occupation: 2[employer]     Employer: [employer]   Tobacco Use    Smoking status: Never    Smokeless tobacco: Never   Substance and Sexual Activity    Alcohol use: Yes    Drug use: No     Comment: In his 20'S    Sexual activity: Yes     Partners: Female       Allergies:  Patient has no known allergies.    Medications:    Current Outpatient Medications:     amLODIPine (NORVASC) 5 MG tablet, Take 1 tablet (5 mg total) by mouth 2 (two) times daily., Disp: 180 tablet, Rfl: 3     ascorbic acid, vitamin C, (VITAMIN C) 500 MG tablet, Take 500 mg by mouth once daily., Disp: , Rfl:     CHOLECALCIFEROL, VITAMIN D3, (VITAMIN D3 ORAL), Take 10,000 Units by mouth once daily. , Disp: , Rfl:     cyanocobalamin (VITAMIN B-12) 1000 MCG tablet, Take 100 mcg by mouth once daily., Disp: , Rfl:     ECHINACEA, BULK, MISC, by Misc.(Non-Drug; Combo Route) route., Disp: , Rfl:     flash glucose scanning reader (FREESTYLE DENNY 14 DAY READER) Misc, Use as directed, Disp: 1 each, Rfl: 0    flash glucose sensor (FREESTYLE DENNY 14 DAY SENSOR) Kit, USE ONE SENSOR AS DIRECTED EVERY 14 DAYS, Disp: 2 kit, Rfl: 11    GLUC GONZALEZ/CHONDRO GONZALEZ A/VIT C/MN (GLUCOSAMINE CHONDROITIN MAXSTR ORAL), Take 1 tablet by mouth 2 (two) times daily., Disp: , Rfl:     losartan (COZAAR) 100 MG tablet, Take 1 tablet (100 mg total) by mouth once daily., Disp: 90 tablet, Rfl: 3    MAGNESIUM ORAL, Take 1 tablet by mouth once daily., Disp: , Rfl:     melatonin 10 mg Tab, Take by mouth., Disp: , Rfl:     metFORMIN (GLUCOPHAGE) 500 MG tablet, Take 1 tablet (500 mg total) by mouth once daily., Disp: 90 tablet, Rfl: 3    niacin 500 MG CpSR, Take 250 mg by mouth every evening., Disp: , Rfl:     omega-3 fatty acids/fish oil (FISH OIL-OMEGA-3 FATTY ACIDS) 300-1,000 mg capsule, Take by mouth once daily., Disp: , Rfl:     oxybutynin (DITROPAN) 5 MG Tab, Take 1 tablet (5 mg total) by mouth every evening., Disp: 90 tablet, Rfl: 3    pyridoxine, vitamin B6, (B-6) 25 MG Tab, Take 25 mg by mouth once daily., Disp: , Rfl:     tadalafiL (CIALIS) 5 MG tablet, Take 1 tablet (5 mg total) by mouth once daily., Disp: 365 tablet, Rfl: 0    traZODone (DESYREL) 50 MG tablet, TAKE 1 TABLET BY MOUTH AT BEDTIME FOR INSOMNIA. OK TO TAKE THE 2ND PILL IN 30 MINUTES IF STILL AWAKE, Disp: 180 tablet, Rfl: 3    valACYclovir (VALTREX) 1000 MG tablet, Take 1 tablet (1,000 mg total) by mouth once daily., Disp: 90 tablet, Rfl: 3    VITAMIN K2 ORAL, Take 1 tablet by mouth once daily.  600mcg, Disp: , Rfl:     zinc acetate 25 mg (zinc) Cap, Take by mouth., Disp: , Rfl:     albuterol 90 mcg/actuation inhaler, Inhale 2 puffs into the lungs every 4 (four) hours as needed for Shortness of Breath. Rescue, Disp: 18 g, Rfl: 3    doxycycline (VIBRA-TABS) 100 MG tablet, Take 1 tablet (100 mg total) by mouth once daily. (Patient not taking: Reported on 4/13/2023), Disp: 90 tablet, Rfl: 3    rosuvastatin (CRESTOR) 10 MG tablet, Take 1 tablet (10 mg total) by mouth once daily., Disp: 90 tablet, Rfl: 3    testosterone cypionate (DEPOTESTOTERONE CYPIONATE) 200 mg/mL injection, Inject 1.5 mLs (300 mg total) into the muscle every 14 (fourteen) days., Disp: 10 mL, Rfl: 1    Review of Systems   Constitutional:  Negative for activity change, appetite change, chills, diaphoresis, fatigue, fever and unexpected weight change.   HENT:  Negative for congestion, dental problem, hearing loss, mouth sores, postnasal drip, rhinorrhea, sinus pressure and trouble swallowing.    Eyes:  Negative for pain, discharge and itching.   Respiratory:  Negative for apnea, cough, choking, chest tightness, shortness of breath and wheezing.    Cardiovascular:  Negative for chest pain, palpitations and leg swelling.   Gastrointestinal:  Negative for abdominal distention, abdominal pain, anal bleeding, blood in stool, constipation, diarrhea, nausea, rectal pain and vomiting.   Endocrine: Negative for polydipsia and polyuria.   Genitourinary:  Positive for decreased urine volume. Negative for difficulty urinating, dysuria, enuresis, flank pain, frequency, genital sores, hematuria, penile discharge, penile pain, penile swelling, scrotal swelling, testicular pain and urgency.   Musculoskeletal:  Negative for arthralgias, back pain and myalgias.   Skin:  Negative for color change, rash and wound.   Neurological:  Negative for dizziness, syncope, speech difficulty, light-headedness and headaches.   Hematological:  Negative for adenopathy. Does not  bruise/bleed easily.   Psychiatric/Behavioral:  Negative for behavioral problems, confusion, hallucinations and sleep disturbance.      Objective:      Physical Exam  Constitutional:       Appearance: He is well-developed.   HENT:      Head: Normocephalic.   Cardiovascular:      Rate and Rhythm: Normal rate.   Pulmonary:      Effort: Pulmonary effort is normal.   Abdominal:      Palpations: Abdomen is soft.   Genitourinary:     Prostate: Normal.      Comments: 3540 g benign no nodules  Skin:     General: Skin is warm.   Neurological:      Mental Status: He is alert.       Assessment:       1. Elevated PSA        Plan:       Dominguez was seen today for elevated psa.    Diagnoses and all orders for this visit:    Elevated PSA  -     POCT URINE DIPSTICK WITHOUT MICROSCOPE  -     Testosterone; Future        Improved PSA.  There is no indication for repeat biopsy.  Will get a testosterone level and have him follow-up with Dr. Baker

## 2023-04-14 ENCOUNTER — OFFICE VISIT (OUTPATIENT)
Dept: UROLOGY | Facility: CLINIC | Age: 68
End: 2023-04-14
Payer: MEDICARE

## 2023-04-14 ENCOUNTER — PATIENT MESSAGE (OUTPATIENT)
Dept: UROLOGY | Facility: CLINIC | Age: 68
End: 2023-04-14
Payer: MEDICARE

## 2023-04-14 VITALS — SYSTOLIC BLOOD PRESSURE: 130 MMHG | HEART RATE: 55 BPM | DIASTOLIC BLOOD PRESSURE: 87 MMHG

## 2023-04-14 DIAGNOSIS — N40.1 BENIGN PROSTATIC HYPERPLASIA WITH LOWER URINARY TRACT SYMPTOMS, SYMPTOM DETAILS UNSPECIFIED: Primary | ICD-10-CM

## 2023-04-14 PROCEDURE — 99999 PR PBB SHADOW E&M-EST. PATIENT-LVL III: ICD-10-PCS | Mod: PBBFAC,,, | Performed by: UROLOGY

## 2023-04-14 PROCEDURE — 3061F NEG MICROALBUMINURIA REV: CPT | Mod: CPTII,S$GLB,, | Performed by: UROLOGY

## 2023-04-14 PROCEDURE — 1160F RVW MEDS BY RX/DR IN RCRD: CPT | Mod: CPTII,S$GLB,, | Performed by: UROLOGY

## 2023-04-14 PROCEDURE — 4010F ACE/ARB THERAPY RXD/TAKEN: CPT | Mod: CPTII,S$GLB,, | Performed by: UROLOGY

## 2023-04-14 PROCEDURE — 3044F PR MOST RECENT HEMOGLOBIN A1C LEVEL <7.0%: ICD-10-PCS | Mod: CPTII,S$GLB,, | Performed by: UROLOGY

## 2023-04-14 PROCEDURE — 1159F MED LIST DOCD IN RCRD: CPT | Mod: CPTII,S$GLB,, | Performed by: UROLOGY

## 2023-04-14 PROCEDURE — 3079F PR MOST RECENT DIASTOLIC BLOOD PRESSURE 80-89 MM HG: ICD-10-PCS | Mod: CPTII,S$GLB,, | Performed by: UROLOGY

## 2023-04-14 PROCEDURE — 1160F PR REVIEW ALL MEDS BY PRESCRIBER/CLIN PHARMACIST DOCUMENTED: ICD-10-PCS | Mod: CPTII,S$GLB,, | Performed by: UROLOGY

## 2023-04-14 PROCEDURE — 4010F PR ACE/ARB THEARPY RXD/TAKEN: ICD-10-PCS | Mod: CPTII,S$GLB,, | Performed by: UROLOGY

## 2023-04-14 PROCEDURE — 1126F PR PAIN SEVERITY QUANTIFIED, NO PAIN PRESENT: ICD-10-PCS | Mod: CPTII,S$GLB,, | Performed by: UROLOGY

## 2023-04-14 PROCEDURE — 99214 OFFICE O/P EST MOD 30 MIN: CPT | Mod: S$GLB,,, | Performed by: UROLOGY

## 2023-04-14 PROCEDURE — 3079F DIAST BP 80-89 MM HG: CPT | Mod: CPTII,S$GLB,, | Performed by: UROLOGY

## 2023-04-14 PROCEDURE — 99999 PR PBB SHADOW E&M-EST. PATIENT-LVL III: CPT | Mod: PBBFAC,,, | Performed by: UROLOGY

## 2023-04-14 PROCEDURE — 3075F SYST BP GE 130 - 139MM HG: CPT | Mod: CPTII,S$GLB,, | Performed by: UROLOGY

## 2023-04-14 PROCEDURE — 3061F PR NEG MICROALBUMINURIA RESULT DOCUMENTED/REVIEW: ICD-10-PCS | Mod: CPTII,S$GLB,, | Performed by: UROLOGY

## 2023-04-14 PROCEDURE — 99214 PR OFFICE/OUTPT VISIT, EST, LEVL IV, 30-39 MIN: ICD-10-PCS | Mod: S$GLB,,, | Performed by: UROLOGY

## 2023-04-14 PROCEDURE — 1126F AMNT PAIN NOTED NONE PRSNT: CPT | Mod: CPTII,S$GLB,, | Performed by: UROLOGY

## 2023-04-14 PROCEDURE — 1159F PR MEDICATION LIST DOCUMENTED IN MEDICAL RECORD: ICD-10-PCS | Mod: CPTII,S$GLB,, | Performed by: UROLOGY

## 2023-04-14 PROCEDURE — 3066F NEPHROPATHY DOC TX: CPT | Mod: CPTII,S$GLB,, | Performed by: UROLOGY

## 2023-04-14 PROCEDURE — 3044F HG A1C LEVEL LT 7.0%: CPT | Mod: CPTII,S$GLB,, | Performed by: UROLOGY

## 2023-04-14 PROCEDURE — 3075F PR MOST RECENT SYSTOLIC BLOOD PRESS GE 130-139MM HG: ICD-10-PCS | Mod: CPTII,S$GLB,, | Performed by: UROLOGY

## 2023-04-14 PROCEDURE — 3066F PR DOCUMENTATION OF TREATMENT FOR NEPHROPATHY: ICD-10-PCS | Mod: CPTII,S$GLB,, | Performed by: UROLOGY

## 2023-04-14 RX ORDER — TESTOSTERONE CYPIONATE 200 MG/ML
100 INJECTION, SOLUTION INTRAMUSCULAR
Qty: 10 ML | Refills: 1 | Status: SHIPPED | OUTPATIENT
Start: 2023-04-14 | End: 2023-10-13

## 2023-04-14 NOTE — PROGRESS NOTES
Subjective:      Dominguez Zapata is a 67 y.o. male who returns today regarding his BPH, hypogonadism, and elevated PSA.    He was previously on testosterone IM 100mg every 1 week (his preferred schedule). Previously tried both clomid and testopel but opted to use injections instead. Pre-treatment symptoms included decreased libido.     He has been travelling extensively since his last visit with me in 2019 and has not had TRT for several months. His symptoms have returned and he would like to restart.    He also has h/o BPH treated w/ Urolift at [facility] in 2014. More recently he has been bothered by nocturia and started nightly ditropan last year. That worked for awhile, but he is again bothered by nocturia x2. He is also taking daily Cialis. We planned repeat Urolift in 2018 but he opted to defer.    AUASS today is 14/3; on daily cialis.    He also has h/o elevated PSA, s/p biopsy in [place] in 2009 (negative) and MRI in 2015 (low probability of tumor).  His highest known PSA was 14.6 in August 2017. Saw Dr. Manning for this yesterday who did not recommend biopsy or MRI at this time.    The following portions of the patient's history were reviewed and updated as appropriate: allergies, current medications, past family history, past medical history, past social history, past surgical history and problem list.    Review of Systems  A comprehensive multipoint review of systems was negative except as otherwise stated in the HPI.     Objective:   Vitals: /87   Pulse (!) 55     Physical Exam   General: alert and oriented, no acute distress  Respiratory: Symmetric expansion, non-labored breathing  Neuro: no gross deficits  Psych: normal judgment and insight, normal mood/affect and non-anxious    Lab Review   Urinalysis demonstrates negative for all components  Lab Results   Component Value Date    WBC 7.12 03/23/2023    HGB 16.1 03/23/2023    HCT 49.9 03/23/2023    MCV 94 03/23/2023     03/23/2023     Lab  Results   Component Value Date    CREATININE 0.9 03/23/2023    BUN 14 03/23/2023     Component      Latest Ref Rng & Units 4/13/2023 3/23/2023   PSA Diagnostic      0.00 - 4.00 ng/mL  10.5 (H)   Testosterone, Total      304 - 1227 ng/dL 190 (L)        Assessment and Plan:   1. Hypogonadism male  -- Restart injections - 100mg every week    2. BPH  -- Continue daily Cialis    3. Elevated PSA  -- Per Dr. Manning

## 2023-04-20 ENCOUNTER — TELEPHONE (OUTPATIENT)
Dept: SLEEP MEDICINE | Facility: OTHER | Age: 68
End: 2023-04-20
Payer: MEDICARE

## 2023-04-28 ENCOUNTER — PATIENT MESSAGE (OUTPATIENT)
Dept: SLEEP MEDICINE | Facility: CLINIC | Age: 68
End: 2023-04-28
Payer: MEDICARE

## 2023-05-18 ENCOUNTER — OFFICE VISIT (OUTPATIENT)
Dept: CARDIOLOGY | Facility: CLINIC | Age: 68
End: 2023-05-18
Payer: MEDICARE

## 2023-05-18 VITALS
BODY MASS INDEX: 29.46 KG/M2 | DIASTOLIC BLOOD PRESSURE: 99 MMHG | HEART RATE: 61 BPM | SYSTOLIC BLOOD PRESSURE: 154 MMHG | WEIGHT: 222.25 LBS | HEIGHT: 73 IN

## 2023-05-18 DIAGNOSIS — I77.810 ASCENDING AORTA DILATATION: Chronic | ICD-10-CM

## 2023-05-18 DIAGNOSIS — I10 PRIMARY HYPERTENSION: ICD-10-CM

## 2023-05-18 DIAGNOSIS — R00.0 TACHYCARDIA: Primary | ICD-10-CM

## 2023-05-18 DIAGNOSIS — R01.1 SYSTOLIC MURMUR: ICD-10-CM

## 2023-05-18 DIAGNOSIS — E66.3 OVERWEIGHT: ICD-10-CM

## 2023-05-18 DIAGNOSIS — I25.10 CORONARY ARTERY DISEASE INVOLVING NATIVE CORONARY ARTERY OF NATIVE HEART WITHOUT ANGINA PECTORIS: ICD-10-CM

## 2023-05-18 PROCEDURE — 99204 OFFICE O/P NEW MOD 45 MIN: CPT | Mod: ,,, | Performed by: INTERNAL MEDICINE

## 2023-05-18 PROCEDURE — 3077F SYST BP >= 140 MM HG: CPT | Mod: CPTII,,, | Performed by: INTERNAL MEDICINE

## 2023-05-18 PROCEDURE — 3044F HG A1C LEVEL LT 7.0%: CPT | Mod: CPTII,,, | Performed by: INTERNAL MEDICINE

## 2023-05-18 PROCEDURE — 3077F PR MOST RECENT SYSTOLIC BLOOD PRESSURE >= 140 MM HG: ICD-10-PCS | Mod: CPTII,,, | Performed by: INTERNAL MEDICINE

## 2023-05-18 PROCEDURE — 3061F NEG MICROALBUMINURIA REV: CPT | Mod: CPTII,,, | Performed by: INTERNAL MEDICINE

## 2023-05-18 PROCEDURE — 3288F FALL RISK ASSESSMENT DOCD: CPT | Mod: CPTII,,, | Performed by: INTERNAL MEDICINE

## 2023-05-18 PROCEDURE — 4010F PR ACE/ARB THEARPY RXD/TAKEN: ICD-10-PCS | Mod: CPTII,,, | Performed by: INTERNAL MEDICINE

## 2023-05-18 PROCEDURE — 3066F NEPHROPATHY DOC TX: CPT | Mod: CPTII,,, | Performed by: INTERNAL MEDICINE

## 2023-05-18 PROCEDURE — 1160F PR REVIEW ALL MEDS BY PRESCRIBER/CLIN PHARMACIST DOCUMENTED: ICD-10-PCS | Mod: CPTII,,, | Performed by: INTERNAL MEDICINE

## 2023-05-18 PROCEDURE — 1126F AMNT PAIN NOTED NONE PRSNT: CPT | Mod: CPTII,,, | Performed by: INTERNAL MEDICINE

## 2023-05-18 PROCEDURE — 1160F RVW MEDS BY RX/DR IN RCRD: CPT | Mod: CPTII,,, | Performed by: INTERNAL MEDICINE

## 2023-05-18 PROCEDURE — 1126F PR PAIN SEVERITY QUANTIFIED, NO PAIN PRESENT: ICD-10-PCS | Mod: CPTII,,, | Performed by: INTERNAL MEDICINE

## 2023-05-18 PROCEDURE — 1101F PR PT FALLS ASSESS DOC 0-1 FALLS W/OUT INJ PAST YR: ICD-10-PCS | Mod: CPTII,,, | Performed by: INTERNAL MEDICINE

## 2023-05-18 PROCEDURE — 3061F PR NEG MICROALBUMINURIA RESULT DOCUMENTED/REVIEW: ICD-10-PCS | Mod: CPTII,,, | Performed by: INTERNAL MEDICINE

## 2023-05-18 PROCEDURE — 3080F DIAST BP >= 90 MM HG: CPT | Mod: CPTII,,, | Performed by: INTERNAL MEDICINE

## 2023-05-18 PROCEDURE — 1159F PR MEDICATION LIST DOCUMENTED IN MEDICAL RECORD: ICD-10-PCS | Mod: CPTII,,, | Performed by: INTERNAL MEDICINE

## 2023-05-18 PROCEDURE — 99214 OFFICE O/P EST MOD 30 MIN: CPT | Performed by: INTERNAL MEDICINE

## 2023-05-18 PROCEDURE — 93000 ELECTROCARDIOGRAM COMPLETE: CPT | Mod: ,,, | Performed by: INTERNAL MEDICINE

## 2023-05-18 PROCEDURE — 3288F PR FALLS RISK ASSESSMENT DOCUMENTED: ICD-10-PCS | Mod: CPTII,,, | Performed by: INTERNAL MEDICINE

## 2023-05-18 PROCEDURE — 1101F PT FALLS ASSESS-DOCD LE1/YR: CPT | Mod: CPTII,,, | Performed by: INTERNAL MEDICINE

## 2023-05-18 PROCEDURE — 99204 PR OFFICE/OUTPT VISIT, NEW, LEVL IV, 45-59 MIN: ICD-10-PCS | Mod: ,,, | Performed by: INTERNAL MEDICINE

## 2023-05-18 PROCEDURE — 93000 EKG 12-LEAD: ICD-10-PCS | Mod: ,,, | Performed by: INTERNAL MEDICINE

## 2023-05-18 PROCEDURE — 4010F ACE/ARB THERAPY RXD/TAKEN: CPT | Mod: CPTII,,, | Performed by: INTERNAL MEDICINE

## 2023-05-18 PROCEDURE — 3066F PR DOCUMENTATION OF TREATMENT FOR NEPHROPATHY: ICD-10-PCS | Mod: CPTII,,, | Performed by: INTERNAL MEDICINE

## 2023-05-18 PROCEDURE — 3008F BODY MASS INDEX DOCD: CPT | Mod: CPTII,,, | Performed by: INTERNAL MEDICINE

## 2023-05-18 PROCEDURE — 3080F PR MOST RECENT DIASTOLIC BLOOD PRESSURE >= 90 MM HG: ICD-10-PCS | Mod: CPTII,,, | Performed by: INTERNAL MEDICINE

## 2023-05-18 PROCEDURE — 3044F PR MOST RECENT HEMOGLOBIN A1C LEVEL <7.0%: ICD-10-PCS | Mod: CPTII,,, | Performed by: INTERNAL MEDICINE

## 2023-05-18 PROCEDURE — 99999 PR PBB SHADOW E&M-EST. PATIENT-LVL IV: ICD-10-PCS | Mod: PBBFAC,,, | Performed by: INTERNAL MEDICINE

## 2023-05-18 PROCEDURE — 99999 PR PBB SHADOW E&M-EST. PATIENT-LVL IV: CPT | Mod: PBBFAC,,, | Performed by: INTERNAL MEDICINE

## 2023-05-18 PROCEDURE — 3008F PR BODY MASS INDEX (BMI) DOCUMENTED: ICD-10-PCS | Mod: CPTII,,, | Performed by: INTERNAL MEDICINE

## 2023-05-18 PROCEDURE — 1159F MED LIST DOCD IN RCRD: CPT | Mod: CPTII,,, | Performed by: INTERNAL MEDICINE

## 2023-05-18 RX ORDER — AMLODIPINE BESYLATE 5 MG/1
2.5 TABLET ORAL NIGHTLY
Qty: 30 TABLET | Refills: 11 | Status: SHIPPED | OUTPATIENT
Start: 2023-05-18

## 2023-05-18 RX ORDER — HYDROCHLOROTHIAZIDE 12.5 MG/1
12.5 TABLET ORAL DAILY
Qty: 90 TABLET | Refills: 3 | Status: SHIPPED | OUTPATIENT
Start: 2023-05-18

## 2023-05-18 NOTE — PATIENT INSTRUCTIONS
"I recommend the book, "The Obesity Code" for weight loss; it recommends intermittent fasting and avoidance of sugar, artificial sweeteners and refined carbohydrates.    Also, here is information on a Mediterranean type diet including fish, the pesco-mediterranean diet from the American College of Cardiology:    1.  Humans are evolutionarily adapted to obtain calories and nutrients from both plant and animal food sources. Many people overconsume animal products, often-processed meats high in saturated fats and chemical additives. In contrast, while strict veganism has gained popularity for many reasons and has value in certain groups, it can cause nutritional deficiencies (vitamin B12, high-quality proteins, iron, zinc, omega-3 fatty acid, vitamin D, and calcium), and predispose to osteopenia, loss of muscle mass, and anemia. This is not true of a lacto-ovo vegetarian diet, which allows no animal-based food except for eggs and dairy. A 6-year study of [id number] [place] [employer] reported a decreased incidence of all-cause mortality when comparing vegetarians with nonvegetarians. However, when the vegetarians were stratified into vegans, lacto-ovo vegetarians, pesco-vegetarians, and semi-vegetarians, the pesco-vegetarians had lowest risks for all-cause mortality, cardiovascular disease (CVD) mortality, and mortality from other causes.     2.  The authors propose a plant-rich diet rich in nuts with fish and seafood as the principle source of animal food. Known as the Pesco-Mediterranean diet, it is supplemented with extra-virgin olive oil (EVOO), which is the principle fat source, along with moderate amounts of dairy (particularly yogurt and cheese) and eggs, as well as modest amounts of alcohol consumption (ideally red wine with the evening meal), but few red and processed meats.     3.  Both epidemiological studies and randomized clinical trials indicate that the traditional Mediterranean diet is associated with " lower risks for all-cause and CVD mortality, coronary heart disease, metabolic syndrome, diabetes, cognitive decline, neurodegenerative diseases (including Alzheimers), depression, overall cancer mortality, and breast and colorectal cancers.     4.  The traditional Mediterranean diet has been endorsed in the most recent Dietary Guidelines for Americans and the American College of Cardiology/American Heart Association guidelines. The 2020 U[employer]. News & World Report ranked it #1 for overall health based upon it being nutritious, safe, relatively easy to follow, protective against CVD and diabetes, and effective for weight loss.     5.  Fish and seafood are important sources of vitamins protein and omega-3 fatty acids, of which the higher blood and adipose tissue are associated with reduced fatal and nonfatal myocardial infarction. When not fried, fish consumption has been associated with reduced risk of heart failure, and the incidence of the metabolic syndrome, coronary heart disease, ischemic stroke, and sudden cardiac death, particularly when seafood replaces less healthy foods.     6.  Unrestricted use of olive oil in the kitchen, on salads (with vinegar), cooking vegetables, and at the table is the foundation of the traditional Mediterranean diet, although olive oil quality is crucial, which makes it expensive. EVOO retains hydrophilic components of olives including highly bioactive polyphenols, which are believed to underlie many of EVOOs cardiometabolic benefits, such as reduced low-density lipoprotein cholesterol (LDL-C) and increased high-density lipoprotein cholesterol (HDL-C), improved vascular reactivity, enhanced HDL particle functionality, and a lower diabetes risk.     7.  Tree nuts, an integral component of the traditional Mediterranean diet, are nutrient dense rich in unsaturated fats, fiber, protein, polyphenols, phytosterols, and tocopherols. Nut consumption is associated with decreased incidence  and mortality rates from both CVD and coronary artery disease (CAD), as well as atrial fibrillation and diabetes. Randomized controlled trials have shown that diets enriched with nuts produce cardiometabolic benefits including improvements in insulin sensitivity, LDL-C, inflammation, and vascular reactivity. A 1-daily serving of mixed nuts resulted in a 28% reduction in CVD risk. Generous intake of nuts does not promote weight gain because of increased satiety and incomplete digestion.     8.  Legumes are an excellent source of vegetable protein, folate, and magnesium and fiber, and like other seeds including peanuts, are rich in polyphenols. Consumption of legumes has been linked to a reduced risk of incident and fatal CVD and CAD, as well as improvements in blood glucose, cholesterol, blood pressure, and body weight. Legumes, like fish, are a satiating and healthy substitute for red meat and processed meats.     9.  Dairy products and eggs are important sources of protein, nonsodium minerals, probiotics, and vitamin D. Although there is no clear consensus among nutrition experts on the role of dairy products in CVD risk, they are allowed in this Pesco-Mediterranean diet. Fermented low-fat versions, such as yogurt and soft cheeses, are preferred; butter and hard cheese are high in saturated fats and salt.     10.  Eggs are composed of beneficial nutrients including all essential amino acids, in addition to minerals (selenium, phosphorus, iodine, zinc), vitamins (A, D, B2, B12, niacin), and carotenoids (lutein, zeaxanthin). Although each yolk contains about 184 mg of dietary cholesterol, large prospective cohorts suggest that egg consumption is unrelated to serum cholesterol and does not increase CVD risk. Eggs are allowed in the Pesco-Mediterranean diet; egg whites are unlimited and preferably no more than 5 yolks/week.     11.  Whole grains, such as barley, whole oats, rye, corn, buckwheat, brown rice, and quinoa,  are an integral part of the traditional Mediterranean diet. Pasta is an example of a starchy food that has a low glycemic index despite being a refined carbohydrate. In the context of a low glycemic index dietary pattern such as the Mediterranean diet, pasta does not adversely affect adiposity and may even help reduce body weight and there is no evidence that pasta promotes cardiometabolic risk factors. White rice is associated with type 2 diabetes mellitus in Asians but not in Western cohorts, possibly because it is cooked and served plain in [place] and in Western cultures cooked in mixed dishes with vegetables and vegetable oil including EVOO.     12.  The staple beverage of the Pesco-Mediterranean diet is water--which can be flavored but not sweetened. Unsweetened tea and coffee are rich in antioxidants and are associated with improved CVD outcomes. If alcohol is consumed at all, dry red wine is recommended, with the ideal amount being a single glass (6 oz) for women and 1 or 2 glasses/day for men (6-12 oz) consumed with meals.     13.  Time-restricted eating, a type of intermittent fasting, is the practice of limiting the daily intake of calories to a window of time usually between 6-12 hours each day. Intermittent fasting when done on a regular basis has been shown to decrease intra-abdominal adipose tissue and reduce free-radical production. This elicits powerful cellular responses that improve glucose metabolism and reduce systemic inflammation, and may also reduce risks of diabetes, CVD, cancer, and neurodegenerative diseases. After a 12-hour overnight fast, insulin levels are typically low, and glycogen stores have been depleted. In this fasted state, the body starts mobilizing fatty acids from adipose cells to burn as metabolic fuel instead of glucose. This improves insulin sensitivity. Time-restricted eating is not more effective for weight loss than standard calorie-restriction, but appears to enhance CV  health even in nonobese people. Fasting may also lower blood pressure and resting heart rate and improve autonomic balance with augmented heart rate variability.     14.  The evidence regarding time-restricted eating is mostly based on animal models and observational human studies. The most popular form of time-restricted eating involves eating two rather than three meals and compressing the calorie-consumption window. No head-to-head studies have been performed to assess the optimal time window.

## 2023-05-18 NOTE — PROGRESS NOTES
Subjective:   05/18/2023     Patient ID:  Dominguez Zapata is a 67 y.o. male who presents for evaulation of Tachycardia, Shortness of Breath, and Coronary Artery Disease      Patient here for review of blood pressures, his blood pressures have been elevated, he was on amlodipine, but developed swelling.  He continues take losartan.      He also had had problems with sleep, was on trazodone, also on antibiotic though recently and felt that this causes heart rate to be elevated, EKG today shows heart rate of 61 beats per minute.  This is primarily on his garments watch.      Patient does have coronary artery disease manifested as an abnormal CT coronary calcium score, 62nd percentile.  He was on statins, but became concerned over potential adverse effects of these.  His LDL in March was 153, HDL 53 and triglycerides 100.  10 year cardiac risk is 25%.    Previously echocardiography has shown a dilated ascending aorta, will repeat echocardiography.      No exertional chest pains or tightness PND or orthopnea.              Prior notes  Assessment:  1. Essential hypertension   2. Hyperlipidemia, unspecified hyperlipidemia type   3. Coronary artery disease due to calcified coronary lesion   4. Ascending aorta dilatation   5. Complex sleep apnea syndrome       Plan:   Add Crestor 10 and amlodipine 10 (he wants 5 bid).   Continue current medicines.   Diet/exercise goals reinforced.   F/U 12 months    Routine f/u.  Planning to go to [place] for a year.  He is doing well with no new symptoms or cardiovascular complaints and no change in exercise capacity.  He denies chest discomfort, ALEXANDER, palpitations, PND/orthopnea, lightheadedness and syncope.     He got a full body CT scan assessment screening test in [place].  Calcium score was 56.  He had mild carotid plaque bilaterally.         Past Medical History:   Diagnosis Date    Colon polyps     Hearing loss in right ear     Low serum testosterone level     Migraine     Mixed  hyperlipidemia     Hyperlipidemia    NS (nuclear sclerosis) 3/10/2015    Prediabetes     Stroke     Unspecified essential hypertension     Essential hypertension       Review of patient's allergies indicates:  No Known Allergies      Current Outpatient Medications:     albuterol 90 mcg/actuation inhaler, Inhale 2 puffs into the lungs every 4 (four) hours as needed for Shortness of Breath. Rescue, Disp: 18 g, Rfl: 3    ascorbic acid, vitamin C, (VITAMIN C) 500 MG tablet, Take 500 mg by mouth once daily., Disp: , Rfl:     CHOLECALCIFEROL, VITAMIN D3, (VITAMIN D3 ORAL), Take 10,000 Units by mouth once daily. , Disp: , Rfl:     cyanocobalamin (VITAMIN B-12) 1000 MCG tablet, Take 100 mcg by mouth once daily., Disp: , Rfl:     flash glucose scanning reader (PaybubbleSTYLE DENNY 14 DAY READER) Misc, Use as directed, Disp: 1 each, Rfl: 0    flash glucose sensor (FREESTYLE DENNY 14 DAY SENSOR) Kit, USE ONE SENSOR AS DIRECTED EVERY 14 DAYS, Disp: 2 kit, Rfl: 11    GLUC GONZALEZ/CHONDRO GONZALEZ A/VIT C/MN (GLUCOSAMINE CHONDROITIN MAXSTR ORAL), Take 1 tablet by mouth 2 (two) times daily., Disp: , Rfl:     losartan (COZAAR) 100 MG tablet, Take 1 tablet (100 mg total) by mouth once daily., Disp: 90 tablet, Rfl: 3    MAGNESIUM ORAL, Take 1 tablet by mouth once daily., Disp: , Rfl:     metFORMIN (GLUCOPHAGE) 500 MG tablet, Take 1 tablet (500 mg total) by mouth once daily., Disp: 90 tablet, Rfl: 3    niacin 500 MG CpSR, Take 250 mg by mouth every evening., Disp: , Rfl:     omega-3 fatty acids/fish oil (FISH OIL-OMEGA-3 FATTY ACIDS) 300-1,000 mg capsule, Take by mouth once daily., Disp: , Rfl:     pyridoxine, vitamin B6, (B-6) 25 MG Tab, Take 25 mg by mouth once daily., Disp: , Rfl:     tadalafiL (CIALIS) 5 MG tablet, Take 1 tablet (5 mg total) by mouth once daily., Disp: 365 tablet, Rfl: 0    testosterone cypionate (DEPOTESTOTERONE CYPIONATE) 200 mg/mL injection, Inject 0.5 mLs (100 mg total) into the muscle every 7 days., Disp: 10 mL, Rfl: 1     traZODone (DESYREL) 50 MG tablet, TAKE 1 TABLET BY MOUTH AT BEDTIME FOR INSOMNIA. OK TO TAKE THE 2ND PILL IN 30 MINUTES IF STILL AWAKE, Disp: 180 tablet, Rfl: 3    valACYclovir (VALTREX) 1000 MG tablet, Take 1 tablet (1,000 mg total) by mouth once daily., Disp: 90 tablet, Rfl: 3    VITAMIN K2 ORAL, Take 1 tablet by mouth once daily. 600mcg, Disp: , Rfl:     zinc acetate 25 mg (zinc) Cap, Take by mouth., Disp: , Rfl:     amLODIPine (NORVASC) 5 MG tablet, Take 0.5 tablets (2.5 mg total) by mouth every evening., Disp: 30 tablet, Rfl: 11    hydroCHLOROthiazide (HYDRODIURIL) 12.5 MG Tab, Take 1 tablet (12.5 mg total) by mouth once daily., Disp: 90 tablet, Rfl: 3     Objective:   Review of Systems   Cardiovascular:  Positive for leg swelling. Negative for chest pain, claudication, cyanosis, dyspnea on exertion, irregular heartbeat, near-syncope, orthopnea, palpitations, paroxysmal nocturnal dyspnea and syncope.       Vitals:    05/18/23 1055   BP: (!) 154/99   Pulse: 61     Wt Readings from Last 3 Encounters:   05/18/23 100.8 kg (222 lb 3.6 oz)   04/13/23 94.8 kg (209 lb)   03/23/23 95 kg (209 lb 7 oz)     Temp Readings from Last 3 Encounters:   03/23/23 97.7 °F (36.5 °C) (Temporal)   02/17/21 97.5 °F (36.4 °C) (Temporal)   03/20/19 99.2 °F (37.3 °C) (Oral)     BP Readings from Last 3 Encounters:   05/18/23 (!) 154/99   04/14/23 130/87   04/13/23 (!) 141/85     Pulse Readings from Last 3 Encounters:   05/18/23 61   04/14/23 (!) 55   04/13/23 (!) 49             Physical Exam  Vitals reviewed.   Constitutional:       General: He is not in acute distress.     Appearance: He is well-developed.   HENT:      Head: Normocephalic and atraumatic.      Nose: Nose normal.   Eyes:      Conjunctiva/sclera: Conjunctivae normal.      Pupils: Pupils are equal, round, and reactive to light.   Neck:      Vascular: No carotid bruit or JVD.   Cardiovascular:      Rate and Rhythm: Normal rate and regular rhythm.      Pulses: Normal pulses and  intact distal pulses.      Heart sounds: Murmur (grade 2 systolic ejection murmur) heard.     No friction rub. No gallop.   Pulmonary:      Effort: Pulmonary effort is normal. No respiratory distress.      Breath sounds: Normal breath sounds. No wheezing or rales.   Chest:      Chest wall: No tenderness.   Abdominal:      General: Bowel sounds are normal. There is no distension.      Palpations: Abdomen is soft.      Tenderness: There is no abdominal tenderness.   Musculoskeletal:         General: No tenderness or deformity. Normal range of motion.      Cervical back: Normal range of motion and neck supple.      Right lower leg: Edema (trace) present.      Left lower leg: Edema (Trace) present.   Skin:     General: Skin is warm and dry.      Findings: No erythema or rash.   Neurological:      Mental Status: He is alert and oriented to person, place, and time.      Cranial Nerves: No cranial nerve deficit.      Motor: No abnormal muscle tone.      Coordination: Coordination normal.   Psychiatric:         Behavior: Behavior normal.         Thought Content: Thought content normal.         Judgment: Judgment normal.         Lab Results   Component Value Date    CHOL 226 (H) 03/23/2023    CHOL 228 (H) 02/12/2021    CHOL 198 02/18/2019     Lab Results   Component Value Date    HDL 53 03/23/2023    HDL 65 02/12/2021    HDL 50 02/18/2019     Lab Results   Component Value Date    LDLCALC 153.0 03/23/2023    LDLCALC 141.0 02/12/2021    LDLCALC 133.2 02/18/2019     Lab Results   Component Value Date    ALT 31 03/23/2023    AST 36 03/23/2023    AST 32 02/12/2021    AST 33 05/20/2019     Lab Results   Component Value Date    CREATININE 0.9 03/23/2023    BUN 14 03/23/2023     03/23/2023    K 4.6 03/23/2023    CO2 25 03/23/2023    CO2 30 (H) 02/12/2021    CO2 27 05/20/2019     Lab Results   Component Value Date    HGB 16.1 03/23/2023    HCT 49.9 03/23/2023    HCT 48.6 02/12/2021    HCT 48.0 02/18/2019                 The  10-year ASCVD risk score (Chace ESTRELLA, et al., 2019) is: 25.5%    Values used to calculate the score:      Age: 67 years      Sex: Male      Is Non- : No      Diabetic: No      Tobacco smoker: No      Systolic Blood Pressure: 154 mmHg      Is BP treated: Yes      HDL Cholesterol: 53 mg/dL      Total Cholesterol: 226 mg/dL          Assessment and Plan:     Tachycardia  Comments:  May have been a drug interaction, now resolved  Orders:  -     IN OFFICE EKG 12-LEAD (to [place])  -     amLODIPine (NORVASC) 5 MG tablet; Take 0.5 tablets (2.5 mg total) by mouth every evening.  Dispense: 30 tablet; Refill: 11    Coronary artery disease involving native coronary artery of native heart without angina pectoris  Comments:  Continues to be asymptomatic  Orders:  -     IN OFFICE EKG 12-LEAD (to [place])  -     CRP, High Sensitivity; Future; Expected date: 06/18/2023    Primary hypertension  Comments:  Blood pressure high now off of amlodipine due to increased swelling   Resume amlodipine lower dose, add low-dose hydrochlorothiazide  Orders:  -     Echo; Future; Expected date: 05/25/2023  -     hydroCHLOROthiazide (HYDRODIURIL) 12.5 MG Tab; Take 1 tablet (12.5 mg total) by mouth once daily.  Dispense: 90 tablet; Refill: 3  -     Magnesium; Future; Expected date: 06/18/2023  -     Basic Metabolic Panel; Future; Expected date: 06/18/2023    Ascending aorta dilatation  Comments:  Check on ECHO  Orders:  -     Echo; Future; Expected date: 05/25/2023    Systolic murmur  Comments:  Check on ECHO       Patient with coronary artery disease, hypercholesterolemia and hypertension.  Increase cardiac risk.  Will try to manage with lowering blood pressure, see above notes, will also check HS CRP for inflammation.  If high, certainly may consider anti hyperlipidemic therapy.  Would not recommend niacin therapy, no evidence for benefit.    Follow up in about 6 months (around 11/18/2023).          Future Appointments   Date Time  Provider Department Center   7/5/2023  3:15 PM CV [facility] [place]   8/3/2023 10:30 AM PRE-ADMIT, ENDO -[facility] ENDO4 [facility]

## 2023-06-04 ENCOUNTER — PATIENT MESSAGE (OUTPATIENT)
Dept: CARDIOLOGY | Facility: CLINIC | Age: 68
End: 2023-06-04
Payer: MEDICARE

## 2023-06-04 DIAGNOSIS — I25.84 CORONARY ARTERY DISEASE DUE TO CALCIFIED CORONARY LESION: ICD-10-CM

## 2023-06-04 DIAGNOSIS — I25.10 CORONARY ARTERY DISEASE DUE TO CALCIFIED CORONARY LESION: ICD-10-CM

## 2023-06-04 DIAGNOSIS — E78.00 PURE HYPERCHOLESTEROLEMIA: Primary | ICD-10-CM

## 2023-06-05 ENCOUNTER — PATIENT MESSAGE (OUTPATIENT)
Dept: CARDIOLOGY | Facility: CLINIC | Age: 68
End: 2023-06-05
Payer: MEDICARE

## 2023-06-05 RX ORDER — ROSUVASTATIN CALCIUM 10 MG/1
10 TABLET, COATED ORAL DAILY
Qty: 90 TABLET | Refills: 3 | Status: SHIPPED | OUTPATIENT
Start: 2023-06-05 | End: 2024-06-04

## 2023-07-28 ENCOUNTER — TELEPHONE (OUTPATIENT)
Dept: SLEEP MEDICINE | Facility: OTHER | Age: 68
End: 2023-07-28
Payer: MEDICARE

## 2023-08-03 ENCOUNTER — PATIENT MESSAGE (OUTPATIENT)
Dept: ENDOSCOPY | Facility: HOSPITAL | Age: 68
End: 2023-08-03

## 2023-08-28 ENCOUNTER — E-VISIT (OUTPATIENT)
Dept: INTERNAL MEDICINE | Facility: CLINIC | Age: 68
End: 2023-08-28
Payer: MEDICARE

## 2023-08-28 DIAGNOSIS — L71.9 ROSACEA: Primary | ICD-10-CM

## 2023-08-28 PROCEDURE — 99421 OL DIG E/M SVC 5-10 MIN: CPT | Mod: ,,, | Performed by: FAMILY MEDICINE

## 2023-08-28 PROCEDURE — 99421 PR E&M, ONLINE DIGIT, EST, < 7 DAYS, 5-10 MINS: ICD-10-PCS | Mod: ,,, | Performed by: FAMILY MEDICINE

## 2023-08-28 RX ORDER — DOXYCYCLINE HYCLATE 100 MG
100 TABLET ORAL DAILY
Qty: 90 TABLET | Refills: 3 | Status: SHIPPED | OUTPATIENT
Start: 2023-08-28 | End: 2023-11-22

## 2023-08-28 NOTE — PROGRESS NOTES
Patient ID: Dominguez Zapata is a 68 y.o. male.    Chief Complaint: Rash (Entered automatically based on patient selection in Patient Portal.)    The patient initiated a request through [email or web address] on 8/28/2023 for evaluation and management with a chief complaint of Rash (Entered automatically based on patient selection in Patient Portal.)     I evaluated the questionnaire submission on 8/28/2023.    [facility] Peq Evisit Rash    8/28/2023 11:24 AM CDT - Filed by Patient   Do you agree to participate in an E-Visit? Yes   If you have any of the following symptoms, please present to your local ER or call 911:  I acknowledge   What is the main issue that you would like for your doctor to address today? Rosaces  has  flare up again, would like to renew prescription for Doxycycline. it worked for me for over 20 years.   Are you able to take your vital signs? Yes   Systolic Blood Pressure: 130   Diastolic Blood Pressure: 81   Weight: 204   Height: 73   Pulse: 64   Temperature: 97.9   Respiration rate: 18   Pulse Oxygen: 94   How would you describe your skin problem? Rash   When did your symptoms first appear? 8/9/2023   Where is it located?  Face   Does it itch? No   Does it hurt? Yes   Where is the pain located? Where the skin change is noted   The pain came on: Gradually   The pain has the character of: Dull   Frequency of the pain (How often does it appear)? It is always there   Please select the face that most closely captures your pain level: 2   Is there discharge or drainage? Yes   Describe the discharge or drainage. Pus colored   Is there bleeding? No   Describe the character Flat;  Blistered;  Closed;  Pus filled   Describe the color Yellow   Has it changed over time? Spread to other locations   Frequency of skin problem Always there   Duration of the skin problem (how long does it stay when it is present) Never goes away   I have had a new exposure to No new exposures   I have had a new exposure to No new exposures    What have you used to treat the skin problem? Doxycycline   If you have used anything for treatment, has it helped the symptoms? Yes   Other generalized symptoms that you associate with the rash No other symptoms   Provide any information you feel is important to your history not asked above    At least one photo is required for treatment to be provided. You can upload a maximum of three photos of the affected area.           Recent Labs Obtained:  No visits with results within 7 Day(s) from this visit.   Latest known visit with results is:   Lab Visit on 04/13/2023   Component Date Value Ref Range Status    Testosterone, Total 04/13/2023 190 (L)  304 - 1227 ng/dL Final       Encounter Diagnosis   Name Primary?    Rosacea Yes        No orders of the defined types were placed in this encounter.     Medications Ordered This Encounter   Medications    doxycycline (VIBRA-TABS) 100 MG tablet     Sig: Take 1 tablet (100 mg total) by mouth once daily.     Dispense:  90 tablet     Refill:  3        Follow up if symptoms worsen or fail to improve.      E-Visit Time Tracking:    Day 1 Time (in minutes): 5     Total Time (in minutes): 5

## 2023-09-05 ENCOUNTER — CLINICAL SUPPORT (OUTPATIENT)
Dept: ENDOSCOPY | Facility: HOSPITAL | Age: 68
End: 2023-09-05
Attending: FAMILY MEDICINE
Payer: MEDICARE

## 2023-09-05 ENCOUNTER — TELEPHONE (OUTPATIENT)
Dept: ENDOSCOPY | Facility: HOSPITAL | Age: 68
End: 2023-09-05

## 2023-09-05 DIAGNOSIS — Z86.010 PERSONAL HISTORY OF COLONIC POLYPS: ICD-10-CM

## 2023-09-05 NOTE — TELEPHONE ENCOUNTER
2nd attempt to contact the patient to schedule an endoscopy procedure(s) colonoscopy.  Spoke with patient. Patient states he currently in [place] visiting his son. He stated he will also be going to [place] and will not be back in the states until around March 2024. Patient not ready to schedule colonoscopy at this time. Patient given number to the endoscopy scheduling department [phone] when he is ready to reschedule.

## 2023-09-05 NOTE — PLAN OF CARE
2nd attempt to contact the patient to schedule an endoscopy procedure(s) colonoscopy.  Spoke with patient. Patient states he currently in [place] visiting his son. He stated he will also be going to [place] and will not be back in the states until around March 2024. Patient not ready to schedule colonoscopy at this time. Patient given number to the endoscopy scheduling department [phone] when he is ready to reschedule.

## 2023-11-22 DIAGNOSIS — L71.9 ROSACEA: ICD-10-CM

## 2023-11-22 RX ORDER — DOXYCYCLINE HYCLATE 100 MG
100 TABLET ORAL
Qty: 90 TABLET | Refills: 2 | Status: SHIPPED | OUTPATIENT
Start: 2023-11-22

## 2023-11-22 NOTE — TELEPHONE ENCOUNTER
Refill Routing Note   Medication(s) are not appropriate for processing by [facility] for the following reason(s):        Outside of protocol    [facility] action(s):  Route               Appointments  past 12m or future 3m with PCP    Date Provider   Last Visit   8/28/2023 Dalton Ahn MD   Next Visit   Visit date not found Dalton Ahn MD   ED visits in past 90 days: 0        Note composed:8:46 AM 11/22/2023

## 2024-02-19 DIAGNOSIS — L71.9 ROSACEA: ICD-10-CM

## 2024-02-19 DIAGNOSIS — I10 PRIMARY HYPERTENSION: ICD-10-CM

## 2024-02-19 DIAGNOSIS — I25.10 CORONARY ARTERY DISEASE DUE TO CALCIFIED CORONARY LESION: ICD-10-CM

## 2024-02-19 DIAGNOSIS — R73.03 PREDIABETES: ICD-10-CM

## 2024-02-19 DIAGNOSIS — G47.00 INSOMNIA, UNSPECIFIED TYPE: ICD-10-CM

## 2024-02-19 DIAGNOSIS — E11.9 TYPE 2 DIABETES MELLITUS WITHOUT COMPLICATION, WITHOUT LONG-TERM CURRENT USE OF INSULIN: ICD-10-CM

## 2024-02-19 DIAGNOSIS — N40.1 BENIGN PROSTATIC HYPERPLASIA WITH LOWER URINARY TRACT SYMPTOMS, SYMPTOM DETAILS UNSPECIFIED: ICD-10-CM

## 2024-02-19 DIAGNOSIS — I77.810 ASCENDING AORTA DILATATION: ICD-10-CM

## 2024-02-19 DIAGNOSIS — I25.84 CORONARY ARTERY DISEASE DUE TO CALCIFIED CORONARY LESION: ICD-10-CM

## 2024-02-19 DIAGNOSIS — E78.5 HYPERLIPIDEMIA, UNSPECIFIED HYPERLIPIDEMIA TYPE: ICD-10-CM

## 2024-02-19 DIAGNOSIS — Z00.00 PREVENTATIVE HEALTH CARE: Primary | ICD-10-CM

## 2024-02-20 ENCOUNTER — TELEPHONE (OUTPATIENT)
Dept: INTERNAL MEDICINE | Facility: CLINIC | Age: 69
End: 2024-02-20
Payer: MEDICARE

## 2024-02-20 RX ORDER — LOSARTAN POTASSIUM 100 MG/1
100 TABLET ORAL
Qty: 90 TABLET | Refills: 0 | Status: SHIPPED | OUTPATIENT
Start: 2024-02-20

## 2024-02-20 RX ORDER — VALACYCLOVIR HYDROCHLORIDE 1 G/1
1000 TABLET, FILM COATED ORAL DAILY
Qty: 90 TABLET | Refills: 0 | Status: SHIPPED | OUTPATIENT
Start: 2024-02-20

## 2024-02-20 NOTE — TELEPHONE ENCOUNTER
Pt states he is not in [place] at this moment he is visiting his son . He states when he returns home he will call office to schedule

## 2024-02-20 NOTE — TELEPHONE ENCOUNTER
Care Due:                  Date            Visit Type   Department     Provider  --------------------------------------------------------------------------------                                EP -                              PRIMARY      MET INTERNAL  Last Visit: 03-      CARE ([facility])   MEDICINE       Dalton Ahn  Next Visit: None Scheduled  None         None Found                                                            Last  Test          Frequency    Reason                     Performed    Due Date  --------------------------------------------------------------------------------    CBC.........  12 months..  valACYclovir.............  03- 03-    CMP.........  12 months..  losartan, metFORMIN,       03- 03-                             valACYclovir.............    HBA1C.......  6 months...  metFORMIN................  03- 09-    [facility] Embedded Care Due Messages. Reference number: [id number].   2/19/2024 8:44:34 PM CST

## 2024-02-20 NOTE — TELEPHONE ENCOUNTER
Refill Routing Note   Medication(s) are not appropriate for processing by [facility] for the following reason(s):        Required vitals abnormal    [facility] action(s):  Defer  Approve     Requires labs : Yes             Appointments  past 12m or future 3m with PCP    Date Provider   Last Visit   8/28/2023 Dalton Ahn MD   Next Visit   Visit date not found Dalton Ahn MD   ED visits in past 90 days: 0        Note composed:5:54 AM 02/20/2024

## 2024-02-26 DIAGNOSIS — G47.00 INSOMNIA, UNSPECIFIED TYPE: ICD-10-CM

## 2024-02-26 RX ORDER — TRAZODONE HYDROCHLORIDE 50 MG/1
TABLET ORAL
Qty: 180 TABLET | Refills: 0 | Status: SHIPPED | OUTPATIENT
Start: 2024-02-26

## 2024-02-26 RX ORDER — METFORMIN HYDROCHLORIDE 500 MG/1
500 TABLET ORAL
Qty: 90 TABLET | Refills: 0 | Status: SHIPPED | OUTPATIENT
Start: 2024-02-26

## 2024-02-26 NOTE — TELEPHONE ENCOUNTER
No care due was identified.  [facility] Embedded Care Due Messages. Reference number: [id number].   2/26/2024 2:40:11 AM CST

## 2024-02-26 NOTE — TELEPHONE ENCOUNTER
Refill Routing Note   Medication(s) are not appropriate for processing by [facility] for the following reason(s):        Required labs outdated    [facility] action(s):  Defer  Approve               Appointments  past 12m or future 3m with PCP    Date Provider   Last Visit   8/28/2023 Dalton Ahn MD   Next Visit   Visit date not found Dalton Ahn MD   ED visits in past 90 days: 0        Note composed:10:41 AM 02/26/2024

## 2024-04-13 DIAGNOSIS — N40.1 BENIGN PROSTATIC HYPERPLASIA WITH LOWER URINARY TRACT SYMPTOMS, SYMPTOM DETAILS UNSPECIFIED: ICD-10-CM

## 2024-04-13 NOTE — TELEPHONE ENCOUNTER
No care due was identified.  [facility] Embedded Care Due Messages. Reference number: [id number].   4/13/2024 1:01:17 PM CDT

## 2024-04-14 RX ORDER — TADALAFIL 5 MG/1
5 TABLET ORAL
Qty: 30 TABLET | Refills: 0 | Status: SHIPPED | OUTPATIENT
Start: 2024-04-14

## 2024-04-14 NOTE — TELEPHONE ENCOUNTER
Refill Decision Note   Dominguez Benny  is requesting a refill authorization.  Brief Assessment and Rationale for Refill:  Approve     Medication Therapy Plan:        Comments:     Note composed:4:21 PM 04/14/2024

## 2024-05-01 DIAGNOSIS — I10 PRIMARY HYPERTENSION: ICD-10-CM

## 2024-05-01 RX ORDER — HYDROCHLOROTHIAZIDE 12.5 MG/1
12.5 TABLET ORAL
Qty: 90 TABLET | Refills: 2 | Status: SHIPPED | OUTPATIENT
Start: 2024-05-01

## 2024-05-02 DIAGNOSIS — R00.0 TACHYCARDIA: ICD-10-CM

## 2024-05-02 RX ORDER — AMLODIPINE BESYLATE 5 MG/1
5 TABLET ORAL 2 TIMES DAILY
Qty: 180 TABLET | Refills: 0 | Status: SHIPPED | OUTPATIENT
Start: 2024-05-02

## 2024-05-02 NOTE — TELEPHONE ENCOUNTER
Refill Routing Note   Medication(s) are not appropriate for processing by [facility] for the following reason(s):        No active prescription written by provider  Required vitals abnormal    [facility] action(s):  Defer     Requires labs : Yes             Appointments  past 12m or future 3m with PCP    Date Provider   Last Visit   8/28/2023 Dalton Ahn MD   Next Visit   Visit date not found Dalton Ahn MD   ED visits in past 90 days: 0        Note composed:11:18 AM 05/02/2024

## 2024-05-02 NOTE — TELEPHONE ENCOUNTER
Care Due:                  Date            Visit Type   Department     Provider  --------------------------------------------------------------------------------                                EP -                              PRIMARY      MET INTERNAL  Last Visit: 03-      CARE ([facility])   MEDICINE       Dalton Ahn  Next Visit: None Scheduled  None         None Found                                                            Last  Test          Frequency    Reason                     Performed    Due Date  --------------------------------------------------------------------------------    CBC.........  12 months..  valACYclovir.............  03- 03-    CMP.........  12 months..  losartan, metFORMIN,       03- 03-                             valACYclovir.............    HBA1C.......  6 months...  metFORMIN................  03- 09-    [facility] Embedded Care Due Messages. Reference number: [id number].   5/02/2024 2:38:33 AM CDT

## 2024-05-02 NOTE — TELEPHONE ENCOUNTER
The patient is overdue for an annual physical exam.  Labs and urine have been ordered.  Please schedule and inform the patient.  Thank you.

## 2024-05-14 RX ORDER — TESTOSTERONE CYPIONATE 200 MG/ML
100 INJECTION, SOLUTION INTRAMUSCULAR
Qty: 10 ML | OUTPATIENT
Start: 2024-05-14 | End: 2024-11-12

## 2024-07-24 ENCOUNTER — TELEPHONE (OUTPATIENT)
Dept: SLEEP MEDICINE | Facility: CLINIC | Age: 69
End: 2024-07-24
Payer: MEDICARE

## 2024-07-24 NOTE — TELEPHONE ENCOUNTER
Spoke with cornelia, informed her we will need release of information  for patient medical records.       ----- Message from Alison Carmona sent at 7/24/2024 10:13 AM CDT -----  Type: Patient Call Back    Who called:Cornelia  [facility] ([place], [place]), [phone] (if do not answer leave a message) fax [phone]    What is the request in detail: callling to get sleep study records sent over. Please call to assist.

## 2024-07-28 DIAGNOSIS — R00.0 TACHYCARDIA: ICD-10-CM

## 2024-07-28 NOTE — TELEPHONE ENCOUNTER
Care Due:                  Date            Visit Type   Department     Provider  --------------------------------------------------------------------------------                                EP -                              PRIMARY      MET INTERNAL  Last Visit: 03-      CARE ([facility])   MEDICINE       Dalton Ahn  Next Visit: None Scheduled  None         None Found                                                            Last  Test          Frequency    Reason                     Performed    Due Date  --------------------------------------------------------------------------------    Office Visit  15 months..  amLODIPine, losartan,      03-   06-                             metFORMIN, tadalafiL,                             traZODone, valACYclovir..    CBC.........  12 months..  valACYclovir.............  03- 03-    CMP.........  12 months..  losartan, metFORMIN,       03- 03-                             valACYclovir.............    HBA1C.......  6 months...  metFORMIN................  03- 09-    [facility] Embedded Care Due Messages. Reference number: [id number].   7/28/2024 9:30:55 AM CDT

## 2024-07-29 RX ORDER — AMLODIPINE BESYLATE 5 MG/1
5 TABLET ORAL 2 TIMES DAILY
Qty: 180 TABLET | Refills: 0 | Status: SHIPPED | OUTPATIENT
Start: 2024-07-29

## 2024-07-29 NOTE — TELEPHONE ENCOUNTER
Refill Routing Note   Medication(s) are not appropriate for processing by [facility] for the following reason(s):        New or recently adjusted medication  Patient not seen by provider within 15 months  Required vitals abnormal    [facility] action(s):  Defer   Requires appointment : Yes   Requires labs : Yes             Appointments  past 12m or future 3m with PCP    Date Provider   Last Visit   8/28/2023 aDlton Ahn MD   Next Visit   Visit date not found Dalton Ahn MD   ED visits in past 90 days: 0        Note composed:3:52 PM 07/29/2024

## 2024-09-19 NOTE — TELEPHONE ENCOUNTER
Medicine NP follow up note    Subjective:  On 10 L NC today, + conversational dyspnea.   Silent aspiration suspected by ST, planned for MBS.   Pt clearly states he wants to be full code.     Vitals (Last 24 Hours):  Heart Rate:  [84-94]   Temp:  [36.5 °C (97.7 °F)-36.9 °C (98.4 °F)]   Resp:  [18-28]   BP: ()/(56-75)   Weight:  [126 kg (278 lb 7.1 oz)]   SpO2:  [86 %-97 %]     I have reviewed all imaging reports and labs pertinent to this visit / presenting problem    PHYSICAL EXAM:  Constitutional: NAD  Eyes: no icterus  ENMT: mucous membranes moist  Respiratory/Thorax: coarse/diminished BS bilaterally, on 10 L NC  Cardiovascular: RRR  Gastrointestinal: obese, ND/S/NT  : Live removed today, + voiding   Extremities: 2 + BLE edema  Neurological: grossly non-focal   Skin: warm and dry, maculopapular rash to chest area   Psych: calm    Scheduled medications  aspirin, 81 mg, oral, Daily  empagliflozin, 10 mg, oral, Daily  enoxaparin, 40 mg, subcutaneous, q12h SHARRON  ezetimibe, 10 mg, oral, Daily  folic acid, 1 mg, oral, Daily  furosemide, 80 mg, intravenous, q12h  insulin lispro, 0-10 Units, subcutaneous, q4h  ipratropium-albuteroL, 3 mL, nebulization, 4x daily  levothyroxine, 125 mcg, oral, Daily  methylPREDNISolone sodium succinate (PF), 20 mg, intravenous, q24h  multivitamin with minerals, 1 tablet, oral, Daily  nystatin, 5 mL, Swish & Swallow, 4x daily  pantoprazole, 40 mg, intravenous, Daily  polyethylene glycol, 17 g, nasogastric tube, Daily  potassium phosphate, 21 mmol, intravenous, Once  [Held by provider] predniSONE, 20 mg, oral, Daily  sennosides-docusate sodium, 2 tablet, oral, BID  tamsulosin, 0.4 mg, oral, Daily  thiamine, 100 mg, oral, Daily    PRN medications: acetaminophen, acetaminophen, dextrose, dextrose, glucagon, glucagon, ipratropium-albuteroL, oxygen, oxygen     ASSESSMENT/PLAN:  68-year-old man with history of obesity, HFrEF, aortic stenosis, HLD (intolerant to statins due to myalgias),  Message sent to pt via portal letting him know his losartan and metformin Rx's were denied bc he hasn't been seen since 2/17/21.   Told him to make an apt for refills     DMT2, COPD, MELISSA on CPAP, lymphedema, hypothyroidism, admitted with sepsis, hypoxic/hypercapnic respiratory failure requiring intubation, CT showed a large loculated R pleural effusion, causing near total collapse of the RML and RLL and multiple infectious/inflammatory nodules, sputum cx with stenotrophomonas.     Acute hypoxic/hypercapnic respiratory failure  PNA due to Stenotrophomonas maltophilia  Right empyema, hydropneumothorax - s/p chest tube placement and removal  COPD  MELISSA, possible OHS   Acute on chronic HFrEF  Suspected silent aspiration   -completed IV Zosyn / Levofloxacin course, ID following   -MBS tomorrow, NPO for now   -Bipap per pulmonary, bronchial hygiene   -IV Lasix per cardio, monitor BP, update TTE   -continue steroid taper (switched to solumedrol while NPO), nebulizers   -wean O2 as able     Hypotension   -monitor BP, remains soft     Hematuria noted on 9/18  -suspect traumatic etiology   -lipscomb removed today, voiding well so far, monitor / bladder scan PRN     Left kidney mass concerning for renal cell carcinoma  -seen by urology, outpatient follow up with Shamika Marin  -repleted     Other comorbidities as above  -continue medications as ordered and adjust based on clinical course     VTE / GI prophylaxis   -subcutaneous Lovenox, PPI, bowel regimen in place     Discharge planning  -LTACH auth      Discussed with Dr. Gatica and the interdisciplinary team     PARAM Calero-CNP

## 2024-10-31 DIAGNOSIS — R00.0 TACHYCARDIA: ICD-10-CM

## 2024-10-31 RX ORDER — AMLODIPINE BESYLATE 5 MG/1
5 TABLET ORAL 2 TIMES DAILY
Qty: 180 TABLET | Refills: 0 | OUTPATIENT
Start: 2024-10-31
